# Patient Record
Sex: FEMALE | Race: OTHER | NOT HISPANIC OR LATINO | URBAN - METROPOLITAN AREA
[De-identification: names, ages, dates, MRNs, and addresses within clinical notes are randomized per-mention and may not be internally consistent; named-entity substitution may affect disease eponyms.]

---

## 2017-01-12 ENCOUNTER — EMERGENCY (EMERGENCY)
Facility: HOSPITAL | Age: 67
LOS: 1 days | Discharge: HOME | End: 2017-01-12
Attending: EMERGENCY MEDICINE | Admitting: EMERGENCY MEDICINE
Payer: MEDICARE

## 2017-01-12 VITALS
RESPIRATION RATE: 20 BRPM | HEART RATE: 84 BPM | SYSTOLIC BLOOD PRESSURE: 163 MMHG | OXYGEN SATURATION: 97 % | TEMPERATURE: 98 F | DIASTOLIC BLOOD PRESSURE: 89 MMHG

## 2017-01-12 DIAGNOSIS — R07.2 PRECORDIAL PAIN: ICD-10-CM

## 2017-01-12 DIAGNOSIS — E03.9 HYPOTHYROIDISM, UNSPECIFIED: ICD-10-CM

## 2017-01-12 DIAGNOSIS — Z87.891 PERSONAL HISTORY OF NICOTINE DEPENDENCE: ICD-10-CM

## 2017-01-12 DIAGNOSIS — R06.02 SHORTNESS OF BREATH: ICD-10-CM

## 2017-01-12 DIAGNOSIS — E11.9 TYPE 2 DIABETES MELLITUS WITHOUT COMPLICATIONS: ICD-10-CM

## 2017-01-12 DIAGNOSIS — I10 ESSENTIAL (PRIMARY) HYPERTENSION: ICD-10-CM

## 2017-01-12 LAB
ALBUMIN SERPL ELPH-MCNC: 4 G/DL — SIGNIFICANT CHANGE UP (ref 3.3–5)
ALP SERPL-CCNC: 135 U/L — HIGH (ref 40–120)
ALT FLD-CCNC: 79 U/L RC — HIGH (ref 10–45)
ANION GAP SERPL CALC-SCNC: 15 MMOL/L — SIGNIFICANT CHANGE UP (ref 5–17)
APTT BLD: 29.7 SEC — SIGNIFICANT CHANGE UP (ref 27.5–37.4)
AST SERPL-CCNC: 39 U/L — SIGNIFICANT CHANGE UP (ref 10–40)
BASE EXCESS BLDV CALC-SCNC: -3 MMOL/L — LOW (ref -2–2)
BASOPHILS # BLD AUTO: 0.1 K/UL — SIGNIFICANT CHANGE UP (ref 0–0.2)
BASOPHILS NFR BLD AUTO: 0.9 % — SIGNIFICANT CHANGE UP (ref 0–2)
BILIRUB SERPL-MCNC: 0.6 MG/DL — SIGNIFICANT CHANGE UP (ref 0.2–1.2)
BUN SERPL-MCNC: 20 MG/DL — SIGNIFICANT CHANGE UP (ref 7–23)
CA-I SERPL-SCNC: 1.2 MMOL/L — SIGNIFICANT CHANGE UP (ref 1.12–1.3)
CALCIUM SERPL-MCNC: 9.2 MG/DL — SIGNIFICANT CHANGE UP (ref 8.4–10.5)
CHLORIDE BLDV-SCNC: 105 MMOL/L — SIGNIFICANT CHANGE UP (ref 96–108)
CHLORIDE SERPL-SCNC: 100 MMOL/L — SIGNIFICANT CHANGE UP (ref 96–108)
CK MB CFR SERPL CALC: 1.8 NG/ML — SIGNIFICANT CHANGE UP (ref 0–3.8)
CK SERPL-CCNC: 47 U/L — SIGNIFICANT CHANGE UP (ref 25–170)
CO2 BLDV-SCNC: 22 MMOL/L — SIGNIFICANT CHANGE UP (ref 22–30)
CO2 SERPL-SCNC: 19 MMOL/L — LOW (ref 22–31)
CREAT SERPL-MCNC: 0.9 MG/DL — SIGNIFICANT CHANGE UP (ref 0.5–1.3)
EOSINOPHIL # BLD AUTO: 0 K/UL — SIGNIFICANT CHANGE UP (ref 0–0.5)
EOSINOPHIL NFR BLD AUTO: 0.2 % — SIGNIFICANT CHANGE UP (ref 0–6)
GAS PNL BLDV: 135 MMOL/L — LOW (ref 136–145)
GAS PNL BLDV: SIGNIFICANT CHANGE UP
GLUCOSE BLDV-MCNC: 225 MG/DL — HIGH (ref 70–99)
GLUCOSE SERPL-MCNC: 221 MG/DL — HIGH (ref 70–99)
HCO3 BLDV-SCNC: 21 MMOL/L — SIGNIFICANT CHANGE UP (ref 21–29)
HCT VFR BLD CALC: 46.5 % — HIGH (ref 34.5–45)
HCT VFR BLDA CALC: 50 % — SIGNIFICANT CHANGE UP (ref 39–50)
HGB BLD CALC-MCNC: 16.4 G/DL — HIGH (ref 11.5–15.5)
HGB BLD-MCNC: 16 G/DL — HIGH (ref 11.5–15.5)
INR BLD: 1.04 RATIO — SIGNIFICANT CHANGE UP (ref 0.88–1.16)
LACTATE BLDV-MCNC: 2.3 MMOL/L — HIGH (ref 0.7–2)
LYMPHOCYTES # BLD AUTO: 33.7 % — SIGNIFICANT CHANGE UP (ref 13–44)
LYMPHOCYTES # BLD AUTO: 4.7 K/UL — HIGH (ref 1–3.3)
MCHC RBC-ENTMCNC: 32.4 PG — SIGNIFICANT CHANGE UP (ref 27–34)
MCHC RBC-ENTMCNC: 34.4 GM/DL — SIGNIFICANT CHANGE UP (ref 32–36)
MCV RBC AUTO: 94.2 FL — SIGNIFICANT CHANGE UP (ref 80–100)
MONOCYTES # BLD AUTO: 1.4 K/UL — HIGH (ref 0–0.9)
MONOCYTES NFR BLD AUTO: 10 % — SIGNIFICANT CHANGE UP (ref 2–14)
NEUTROPHILS # BLD AUTO: 7.7 K/UL — HIGH (ref 1.8–7.4)
NEUTROPHILS NFR BLD AUTO: 55.2 % — SIGNIFICANT CHANGE UP (ref 43–77)
NT-PROBNP SERPL-SCNC: 38 PG/ML — SIGNIFICANT CHANGE UP (ref 0–300)
PCO2 BLDV: 36 MMHG — SIGNIFICANT CHANGE UP (ref 35–50)
PH BLDV: 7.38 — SIGNIFICANT CHANGE UP (ref 7.35–7.45)
PLATELET # BLD AUTO: 186 K/UL — SIGNIFICANT CHANGE UP (ref 150–400)
PO2 BLDV: 60 MMHG — HIGH (ref 25–45)
POTASSIUM BLDV-SCNC: 4.3 MMOL/L — SIGNIFICANT CHANGE UP (ref 3.5–5)
POTASSIUM SERPL-MCNC: 4.5 MMOL/L — SIGNIFICANT CHANGE UP (ref 3.5–5.3)
POTASSIUM SERPL-SCNC: 4.5 MMOL/L — SIGNIFICANT CHANGE UP (ref 3.5–5.3)
PROT SERPL-MCNC: 7.7 G/DL — SIGNIFICANT CHANGE UP (ref 6–8.3)
PROTHROM AB SERPL-ACNC: 11.3 SEC — SIGNIFICANT CHANGE UP (ref 10–13.1)
RBC # BLD: 4.93 M/UL — SIGNIFICANT CHANGE UP (ref 3.8–5.2)
RBC # FLD: 13.2 % — SIGNIFICANT CHANGE UP (ref 10.3–14.5)
SAO2 % BLDV: 89 % — HIGH (ref 67–88)
SODIUM SERPL-SCNC: 134 MMOL/L — LOW (ref 135–145)
TROPONIN T SERPL-MCNC: <0.01 NG/ML — SIGNIFICANT CHANGE UP (ref 0–0.06)
TROPONIN T SERPL-MCNC: <0.01 NG/ML — SIGNIFICANT CHANGE UP (ref 0–0.06)
WBC # BLD: 13.9 K/UL — HIGH (ref 3.8–10.5)
WBC # FLD AUTO: 13.9 K/UL — HIGH (ref 3.8–10.5)

## 2017-01-12 PROCEDURE — 93010 ELECTROCARDIOGRAM REPORT: CPT

## 2017-01-12 PROCEDURE — 99220: CPT | Mod: 25

## 2017-01-12 PROCEDURE — 71020: CPT | Mod: 26

## 2017-01-12 PROCEDURE — 71275 CT ANGIOGRAPHY CHEST: CPT | Mod: 26

## 2017-01-12 PROCEDURE — 93308 TTE F-UP OR LMTD: CPT | Mod: 26

## 2017-01-12 RX ORDER — ASPIRIN/CALCIUM CARB/MAGNESIUM 324 MG
324 TABLET ORAL DAILY
Qty: 0 | Refills: 0 | Status: DISCONTINUED | OUTPATIENT
Start: 2017-01-12 | End: 2017-01-12

## 2017-01-12 RX ORDER — SODIUM CHLORIDE 9 MG/ML
3 INJECTION INTRAMUSCULAR; INTRAVENOUS; SUBCUTANEOUS EVERY 12 HOURS
Qty: 0 | Refills: 0 | Status: DISCONTINUED | OUTPATIENT
Start: 2017-01-12 | End: 2017-01-16

## 2017-01-12 RX ORDER — SODIUM CHLORIDE 9 MG/ML
1000 INJECTION INTRAMUSCULAR; INTRAVENOUS; SUBCUTANEOUS ONCE
Qty: 0 | Refills: 0 | Status: COMPLETED | OUTPATIENT
Start: 2017-01-12 | End: 2017-01-12

## 2017-01-12 RX ORDER — IPRATROPIUM/ALBUTEROL SULFATE 18-103MCG
3 AEROSOL WITH ADAPTER (GRAM) INHALATION ONCE
Qty: 0 | Refills: 0 | Status: COMPLETED | OUTPATIENT
Start: 2017-01-12 | End: 2017-01-12

## 2017-01-12 RX ORDER — ASPIRIN/CALCIUM CARB/MAGNESIUM 324 MG
162 TABLET ORAL DAILY
Qty: 0 | Refills: 0 | Status: DISCONTINUED | OUTPATIENT
Start: 2017-01-12 | End: 2017-01-16

## 2017-01-12 RX ORDER — IPRATROPIUM/ALBUTEROL SULFATE 18-103MCG
3 AEROSOL WITH ADAPTER (GRAM) INHALATION EVERY 6 HOURS
Qty: 0 | Refills: 0 | Status: DISCONTINUED | OUTPATIENT
Start: 2017-01-12 | End: 2017-01-16

## 2017-01-12 RX ORDER — SODIUM CHLORIDE 9 MG/ML
3 INJECTION INTRAMUSCULAR; INTRAVENOUS; SUBCUTANEOUS ONCE
Qty: 0 | Refills: 0 | Status: COMPLETED | OUTPATIENT
Start: 2017-01-12 | End: 2017-01-12

## 2017-01-12 RX ADMIN — SODIUM CHLORIDE 3 MILLILITER(S): 9 INJECTION INTRAMUSCULAR; INTRAVENOUS; SUBCUTANEOUS at 16:27

## 2017-01-12 RX ADMIN — SODIUM CHLORIDE 3 MILLILITER(S): 9 INJECTION INTRAMUSCULAR; INTRAVENOUS; SUBCUTANEOUS at 11:16

## 2017-01-12 RX ADMIN — Medication 162 MILLIGRAM(S): at 12:51

## 2017-01-12 RX ADMIN — SODIUM CHLORIDE 1000 MILLILITER(S): 9 INJECTION INTRAMUSCULAR; INTRAVENOUS; SUBCUTANEOUS at 17:03

## 2017-01-12 RX ADMIN — Medication 3 MILLILITER(S): at 12:50

## 2017-01-12 RX ADMIN — Medication 3 MILLILITER(S): at 22:09

## 2017-01-12 NOTE — ED CDU PROVIDER NOTE - CONDUCTED A DETAILED DISCUSSION WITH PATIENT AND/OR GUARDIAN REGARDING, MDM
radiology results/lab results/need for outpatient follow-up/return to ED if symptoms worsen, persist or questions arise

## 2017-01-12 NOTE — ED PROVIDER NOTE - PROGRESS NOTE DETAILS
Pt pending CTA read. Bedside echo showed grossly preserved systolic function with mild pericardial effusion. BNP <50. Will admit to CDU for nuclear stress test. Pt will need follow up with cardiology after nuc stress test.

## 2017-01-12 NOTE — ED PROVIDER NOTE - INTERPRETATION
normal sinus rhythm, Normal axis, Normal AL interval and QRS complex. There are no acute ischemic ST or T-wave changes.

## 2017-01-12 NOTE — ED CDU PROVIDER NOTE - PLAN OF CARE
1.  Stay Hydrated  2.  Follow up with your PMD in 2-3 days.  Bring a copy of your results with you to your appointment. Given incidental CT findings (pulmonary nodules) we recommend repeat CT scan in 6 months.  3. Return to the ER for worsening chest pain, shortness of breath or any other concerning symptoms 1.  Stay Hydrated  2.  Follow up at the [facility] Medicine [facility] and the Cardiology Clinic as soon as possible for further evaluation (phone numbers provided).  Bring a copy of your results with you to your appointment. Given incidental CT findings (pulmonary nodules) we recommend repeat CT scan in 6 months.  3. Return to the ER for worsening chest pain, shortness of breath or any other concerning symptoms

## 2017-01-12 NOTE — ED PROVIDER NOTE - ATTENDING CONTRIBUTION TO CARE
I performed a history and physical exam of the patient and discussed their management with the PA. I reviewed the resident's note and agree with the documented findings and plan of care.     66yoF with hx heavy smoking presents to ED c/o worsening exertional dyspnea x 4 weeks. States can ambulate half a block without stopping. No nausea, chest pain. No diaphoresis. Initially seen in urgent care, sent for EKG with possible inferior infarct aqe indeterminate and RBBB.   Gen: well appearing without any distress. AAOx3.  HEENT: PERRL, EOMI, atraumatic  Neck: supple  Heart: RRR, S1S2  Lungs: fait wheeze RUL field. otherwise CTA.   Abd: soft, nontender, nondistended  Ext: No deformity. No erythema. No calf tenderness. Equal calf sizes. trace edema.   Neuro: Grossly intact. Normal gait.     A: Dyspnea diff dx includes chf/acs, PE, COPD ex  P: combineb  labs inc Martina  EKG- RBBB with Q waves inferior leads unchanged from urgent care EKG.   CXR  Echo  possible CDU admission for stress test.

## 2017-01-12 NOTE — ED CDU PROVIDER NOTE - DETAILS
Dyspnea on Exertion  -Frequent reevaluations  -tele monitoring  -repeat troponin w/ ekg  -nuclear stress test in AM  -Case d/w Dr. Pace

## 2017-01-12 NOTE — ED ADULT NURSE NOTE - OBJECTIVE STATEMENT
65 y/o female c/o MARK  getting worse over the past week.  Pt has had SOB over the past 4 weeks, was informed by PMD it was bronchitis.  Pt was given  azithromycin and then steroids which she completed.  Meds helped with the cough but still  with SOB.  No chest pain, no palpitations, no nausea, no vomiting, no sweating, no fever,  no leg swelling.  Respiration easy and non nvmfr9er.  Skin warm and dry. 65 y/o female c/o MARK  getting worse over the past week.  Pt has had SOB over the past 4 weeks, was informed by PMD it was bronchitis.  Pt was given  azithromycin and then steroids which she completed.  Meds helped with the cough but still  with SOB.  No chest pain, no palpitations, no nausea, no vomiting, no sweating, no fever,  no leg swelling.  Respiration easy and non labored.  Skin warm and dry.

## 2017-01-12 NOTE — ED CDU PROVIDER NOTE - PROGRESS NOTE DETAILS
Patient seen at bedside in NAD.  VSS.  Patient resting comfortably without complaints.  Patient reports that CP has resolved.  Still with SOB at rest.  No cardiac events noted on tele.  Will give duoneb and reasess. -Aleksey Moe PA-C Patient seen at bedside in NAD.  VSS.  Patient resting comfortably without complaints. Patient reports breathing improved s/p nebs.  No cardiac events noted on tele -Aleksey Moe PA-C Patient seen at bedside in NAD.  VSS.  Patient resting comfortably without complaints. No cardiac events noted on tele -Aleksey Moe PA-C Patient resting comfortably in bed, NAD, VSS, no comlaints at this time, no events on tele. Transportation here to bring patient to stress test. Marcin Fraser PA-C. Kathrin: Patient did well overnight. Went to nuclear stress test this am.  Patient with no chest pain but in continuous eval for MARK.  If stress test ok will d/c.  I Ranulfo Pinon MD have personally performed a face to face diagnostic evaluation on this patient.  I have reviewed the ACP note and agree with the history, exam, and plan of care, except as noted.   My medical decison making and observations are found above.  T Patient returned from Stress test, NAD, VSS, no complaints. Results show no ekg changes or areas of ischemia. Will discharge home to follow up with PMD and Cardiologist (will provide with phone numbers). Strict return instructions provided, discussed w/ Dr. Pinon, patient is stable for discharge home. Marcin Fraser PA-C.

## 2017-01-12 NOTE — ED PROVIDER NOTE - OBJECTIVE STATEMENT
66 y.o. female former smoker coming in with Gaming that has been getting worse over the past week.  Pt has had SoB over the past 4 weeks, was told it was bronchitis.  Completed a course of azithromycin and then steroids which helped with the cough but still SoB.  No chest pain or palpitations, no nausea, no vomiting, no sweating.  No swelling of LE's or recent prolonged immobilization.

## 2017-01-12 NOTE — ED CDU PROVIDER NOTE - OBJECTIVE STATEMENT
66 y.o. female former smoker coming in with Gaming that has been getting worse over the past week.  Pt has had SoB over the past 4 weeks, was told it was bronchitis.  Completed a course of azithromycin and then steroids which helped with the cough but still SoB. No palpitations, no nausea, no vomiting, no sweating.  No swelling of LE's or recent prolonged immobilization.  Does endorse some non-radiating substernal chest pain this morning which has resolved. CTA done while patient was in ED shows no PE. Will plan for repeat troponin and stress test.

## 2017-01-12 NOTE — ED CDU PROVIDER NOTE - ATTENDING CONTRIBUTION TO CARE
Mil Pace DO: I performed a history and physical exam of the patient and discussed their management with the PA. I reviewed the resident's note and agree with the documented findings and plan of care. Patient has exertional dyspnea, possibly 2/2 viral colin/myocarditis vs old MI. Patient will need nuclear stress test, followed by Cardiology eval (outpt)

## 2017-01-13 VITALS
OXYGEN SATURATION: 97 % | HEART RATE: 76 BPM | SYSTOLIC BLOOD PRESSURE: 146 MMHG | DIASTOLIC BLOOD PRESSURE: 93 MMHG | RESPIRATION RATE: 18 BRPM | TEMPERATURE: 98 F

## 2017-01-13 PROCEDURE — 85379 FIBRIN DEGRADATION QUANT: CPT

## 2017-01-13 PROCEDURE — 82550 ASSAY OF CK (CPK): CPT

## 2017-01-13 PROCEDURE — G0378: CPT

## 2017-01-13 PROCEDURE — 85730 THROMBOPLASTIN TIME PARTIAL: CPT

## 2017-01-13 PROCEDURE — 82330 ASSAY OF CALCIUM: CPT

## 2017-01-13 PROCEDURE — 78452 HT MUSCLE IMAGE SPECT MULT: CPT

## 2017-01-13 PROCEDURE — 84484 ASSAY OF TROPONIN QUANT: CPT

## 2017-01-13 PROCEDURE — 99284 EMERGENCY DEPT VISIT MOD MDM: CPT | Mod: 25

## 2017-01-13 PROCEDURE — 93005 ELECTROCARDIOGRAM TRACING: CPT

## 2017-01-13 PROCEDURE — 71046 X-RAY EXAM CHEST 2 VIEWS: CPT

## 2017-01-13 PROCEDURE — 93018 CV STRESS TEST I&R ONLY: CPT

## 2017-01-13 PROCEDURE — 84132 ASSAY OF SERUM POTASSIUM: CPT

## 2017-01-13 PROCEDURE — 82803 BLOOD GASES ANY COMBINATION: CPT

## 2017-01-13 PROCEDURE — 85014 HEMATOCRIT: CPT

## 2017-01-13 PROCEDURE — 84295 ASSAY OF SERUM SODIUM: CPT

## 2017-01-13 PROCEDURE — 93016 CV STRESS TEST SUPVJ ONLY: CPT

## 2017-01-13 PROCEDURE — 82553 CREATINE MB FRACTION: CPT

## 2017-01-13 PROCEDURE — 94640 AIRWAY INHALATION TREATMENT: CPT

## 2017-01-13 PROCEDURE — 71275 CT ANGIOGRAPHY CHEST: CPT

## 2017-01-13 PROCEDURE — 82435 ASSAY OF BLOOD CHLORIDE: CPT

## 2017-01-13 PROCEDURE — 85610 PROTHROMBIN TIME: CPT

## 2017-01-13 PROCEDURE — 78452 HT MUSCLE IMAGE SPECT MULT: CPT | Mod: 26

## 2017-01-13 PROCEDURE — A9500: CPT

## 2017-01-13 PROCEDURE — 85027 COMPLETE CBC AUTOMATED: CPT

## 2017-01-13 PROCEDURE — 80053 COMPREHEN METABOLIC PANEL: CPT

## 2017-01-13 PROCEDURE — 93017 CV STRESS TEST TRACING ONLY: CPT

## 2017-01-13 PROCEDURE — 83605 ASSAY OF LACTIC ACID: CPT

## 2017-01-13 PROCEDURE — 93308 TTE F-UP OR LMTD: CPT

## 2017-01-13 PROCEDURE — 99217: CPT

## 2017-01-13 PROCEDURE — 82947 ASSAY GLUCOSE BLOOD QUANT: CPT

## 2017-01-13 PROCEDURE — 83880 ASSAY OF NATRIURETIC PEPTIDE: CPT

## 2017-01-13 RX ORDER — DIPHENHYDRAMINE HCL 50 MG
25 CAPSULE ORAL ONCE
Qty: 0 | Refills: 0 | Status: COMPLETED | OUTPATIENT
Start: 2017-01-13 | End: 2017-01-13

## 2017-01-13 RX ADMIN — Medication 3 MILLILITER(S): at 12:08

## 2017-01-13 RX ADMIN — Medication 25 MILLIGRAM(S): at 00:35

## 2017-01-13 RX ADMIN — Medication 3 MILLILITER(S): at 03:46

## 2017-01-13 RX ADMIN — SODIUM CHLORIDE 3 MILLILITER(S): 9 INJECTION INTRAMUSCULAR; INTRAVENOUS; SUBCUTANEOUS at 06:56

## 2017-01-13 NOTE — ED ADULT NURSE REASSESSMENT NOTE - NS ED NURSE REASSESS COMMENT FT1
Pt. discharged to home as per Dr. Rosado's order. Pt. verbalized understanding to discharge instructions given by LIAM Burch. Pt. to follow up with PCP and cardiologist post discharge. Pt. to return to ER for worsening symptoms such as fever, CP and SOB. Questions answered and verbalized good understanding. Discharge criteria met. Discharged home as per protocol. Telemetry discontinued and IVL removed by LIAM Burch. No redness or bleeding from IV site. Pt. left the unit in stable condition accompanied by family.
Received pt. from GLORY Sim. VSS. NSR on tele. No acute distress noted. No complaints voiced. IV access patent. Educated about fall precautions. Safety maintained. Awaiting nuclear stress test this am. Needs attended to. Will continue to monitor.
Pt received from LEANNA Holloway. No complaints of SOB or dizziness. Pt has some dyspnea on exertion. Nebulizer treatment given as per MAR. 2L NC provided: pt O2 at 98%. Plan of care explained. Safety & comfort measures maintained. Call bell in reach. Will continue to monitor.

## 2017-02-07 PROBLEM — E11.9 TYPE 2 DIABETES MELLITUS WITHOUT COMPLICATIONS: Chronic | Status: ACTIVE | Noted: 2017-01-12

## 2017-02-07 PROBLEM — E05.90 THYROTOXICOSIS, UNSPECIFIED WITHOUT THYROTOXIC CRISIS OR STORM: Chronic | Status: ACTIVE | Noted: 2017-01-12

## 2017-02-07 PROBLEM — I10 ESSENTIAL (PRIMARY) HYPERTENSION: Chronic | Status: ACTIVE | Noted: 2017-01-12

## 2017-03-07 ENCOUNTER — APPOINTMENT (OUTPATIENT)
Dept: OBGYN | Facility: CLINIC | Age: 67
End: 2017-03-07

## 2017-03-07 VITALS
BODY MASS INDEX: 39.56 KG/M2 | HEART RATE: 76 BPM | DIASTOLIC BLOOD PRESSURE: 88 MMHG | HEIGHT: 62 IN | WEIGHT: 215 LBS | SYSTOLIC BLOOD PRESSURE: 146 MMHG

## 2017-03-07 DIAGNOSIS — Z80.3 FAMILY HISTORY OF MALIGNANT NEOPLASM OF BREAST: ICD-10-CM

## 2017-03-07 DIAGNOSIS — Z83.3 FAMILY HISTORY OF DIABETES MELLITUS: ICD-10-CM

## 2017-03-07 DIAGNOSIS — Z83.49 FAMILY HISTORY OF OTHER ENDOCRINE, NUTRITIONAL AND METABOLIC DISEASES: ICD-10-CM

## 2017-03-07 DIAGNOSIS — Z12.31 ENCOUNTER FOR SCREENING MAMMOGRAM FOR MALIGNANT NEOPLASM OF BREAST: ICD-10-CM

## 2017-03-07 DIAGNOSIS — Z78.9 OTHER SPECIFIED HEALTH STATUS: ICD-10-CM

## 2017-03-07 DIAGNOSIS — Z82.49 FAMILY HISTORY OF ISCHEMIC HEART DISEASE AND OTHER DISEASES OF THE CIRCULATORY SYSTEM: ICD-10-CM

## 2017-03-07 DIAGNOSIS — Z86.39 PERSONAL HISTORY OF OTHER ENDOCRINE, NUTRITIONAL AND METABOLIC DISEASE: ICD-10-CM

## 2017-03-09 LAB — HPV HIGH+LOW RISK DNA PNL CVX: NEGATIVE

## 2017-03-10 LAB — CYTOLOGY CVX/VAG DOC THIN PREP: NORMAL

## 2017-03-31 ENCOUNTER — APPOINTMENT (OUTPATIENT)
Dept: PULMONOLOGY | Facility: CLINIC | Age: 67
End: 2017-03-31

## 2017-03-31 VITALS
SYSTOLIC BLOOD PRESSURE: 120 MMHG | HEIGHT: 62 IN | DIASTOLIC BLOOD PRESSURE: 80 MMHG | HEART RATE: 74 BPM | WEIGHT: 220 LBS | OXYGEN SATURATION: 97 % | BODY MASS INDEX: 40.48 KG/M2

## 2017-03-31 DIAGNOSIS — Z01.419 ENCOUNTER FOR GYNECOLOGICAL EXAMINATION (GENERAL) (ROUTINE) W/OUT ABNORMAL FINDINGS: ICD-10-CM

## 2017-03-31 DIAGNOSIS — Z86.39 PERSONAL HISTORY OF OTHER ENDOCRINE, NUTRITIONAL AND METABOLIC DISEASE: ICD-10-CM

## 2017-03-31 DIAGNOSIS — Z86.79 PERSONAL HISTORY OF OTHER DISEASES OF THE CIRCULATORY SYSTEM: ICD-10-CM

## 2017-03-31 DIAGNOSIS — Z82.3 FAMILY HISTORY OF STROKE: ICD-10-CM

## 2017-03-31 DIAGNOSIS — E78.00 PURE HYPERCHOLESTEROLEMIA, UNSPECIFIED: ICD-10-CM

## 2017-03-31 DIAGNOSIS — Z13.820 ENCOUNTER FOR SCREENING FOR OSTEOPOROSIS: ICD-10-CM

## 2017-03-31 RX ORDER — FLUTICASONE PROPIONATE 50 UG/1
50 SPRAY, METERED NASAL
Qty: 16 | Refills: 0 | Status: ACTIVE | COMMUNITY
Start: 2016-12-26

## 2017-03-31 RX ORDER — AZITHROMYCIN 250 MG/1
250 TABLET, FILM COATED ORAL
Qty: 6 | Refills: 0 | Status: DISCONTINUED | COMMUNITY
Start: 2017-01-02 | End: 2017-03-31

## 2017-03-31 RX ORDER — AZITHROMYCIN 500 MG/1
500 TABLET, FILM COATED ORAL
Qty: 5 | Refills: 0 | Status: DISCONTINUED | COMMUNITY
Start: 2016-12-26 | End: 2017-03-31

## 2017-03-31 RX ORDER — BENZONATATE 200 MG/1
200 CAPSULE ORAL
Qty: 14 | Refills: 0 | Status: DISCONTINUED | COMMUNITY
Start: 2016-12-26 | End: 2017-03-31

## 2017-03-31 RX ORDER — PREDNISONE 20 MG/1
20 TABLET ORAL
Qty: 10 | Refills: 0 | Status: DISCONTINUED | COMMUNITY
Start: 2017-01-02 | End: 2017-03-31

## 2017-05-03 ENCOUNTER — LABORATORY RESULT (OUTPATIENT)
Age: 67
End: 2017-05-03

## 2017-05-04 ENCOUNTER — APPOINTMENT (OUTPATIENT)
Dept: INTERNAL MEDICINE | Facility: CLINIC | Age: 67
End: 2017-05-04

## 2017-05-04 VITALS
WEIGHT: 215 LBS | OXYGEN SATURATION: 98 % | SYSTOLIC BLOOD PRESSURE: 154 MMHG | DIASTOLIC BLOOD PRESSURE: 88 MMHG | HEIGHT: 62 IN | BODY MASS INDEX: 39.56 KG/M2 | HEART RATE: 81 BPM

## 2017-05-04 DIAGNOSIS — Z87.891 PERSONAL HISTORY OF NICOTINE DEPENDENCE: ICD-10-CM

## 2017-05-04 DIAGNOSIS — I45.10 UNSPECIFIED RIGHT BUNDLE-BRANCH BLOCK: ICD-10-CM

## 2017-05-04 DIAGNOSIS — R32 UNSPECIFIED URINARY INCONTINENCE: ICD-10-CM

## 2017-05-04 RX ORDER — METFORMIN HYDROCHLORIDE 500 MG/1
500 TABLET, COATED ORAL
Qty: 60 | Refills: 0 | Status: DISCONTINUED | COMMUNITY
Start: 2016-12-26 | End: 2017-05-04

## 2017-05-04 RX ORDER — ATORVASTATIN CALCIUM 10 MG/1
10 TABLET, FILM COATED ORAL
Refills: 0 | Status: DISCONTINUED | COMMUNITY
End: 2017-05-04

## 2017-05-05 LAB
25(OH)D3 SERPL-MCNC: 29.1 NG/ML
ALBUMIN SERPL ELPH-MCNC: 4.1 G/DL
ALP BLD-CCNC: 119 U/L
ALT SERPL-CCNC: 101 U/L
ANION GAP SERPL CALC-SCNC: 19 MMOL/L
AST SERPL-CCNC: 84 U/L
BASOPHILS # BLD AUTO: 0.05 K/UL
BASOPHILS NFR BLD AUTO: 0.6 %
BILIRUB SERPL-MCNC: 0.2 MG/DL
BUN SERPL-MCNC: 23 MG/DL
CALCIUM SERPL-MCNC: 9.2 MG/DL
CHLORIDE SERPL-SCNC: 107 MMOL/L
CHOLEST SERPL-MCNC: 188 MG/DL
CHOLEST/HDLC SERPL: 4 RATIO
CO2 SERPL-SCNC: 18 MMOL/L
CREAT SERPL-MCNC: 0.98 MG/DL
EOSINOPHIL # BLD AUTO: 0.21 K/UL
EOSINOPHIL NFR BLD AUTO: 2.4 %
GLUCOSE SERPL-MCNC: 98 MG/DL
HBA1C MFR BLD HPLC: 7.8 %
HCT VFR BLD CALC: 41.4 %
HCV AB SER QL: NONREACTIVE
HCV S/CO RATIO: 0.09 S/CO
HDLC SERPL-MCNC: 47 MG/DL
HGB BLD-MCNC: 13.4 G/DL
IMM GRANULOCYTES NFR BLD AUTO: 0.1 %
LDLC SERPL CALC-MCNC: 81 MG/DL
LYMPHOCYTES # BLD AUTO: 4.03 K/UL
LYMPHOCYTES NFR BLD AUTO: 45.6 %
MAN DIFF?: NORMAL
MCHC RBC-ENTMCNC: 30.4 PG
MCHC RBC-ENTMCNC: 32.4 GM/DL
MCV RBC AUTO: 93.9 FL
MONOCYTES # BLD AUTO: 0.99 K/UL
MONOCYTES NFR BLD AUTO: 11.2 %
NEUTROPHILS # BLD AUTO: 3.54 K/UL
NEUTROPHILS NFR BLD AUTO: 40.1 %
PLATELET # BLD AUTO: 221 K/UL
POTASSIUM SERPL-SCNC: 4.4 MMOL/L
PROT SERPL-MCNC: 7.3 G/DL
RBC # BLD: 4.41 M/UL
RBC # FLD: 14.5 %
SODIUM SERPL-SCNC: 144 MMOL/L
TRIGL SERPL-MCNC: 302 MG/DL
TSH SERPL-ACNC: 5.74 UIU/ML
VIT B12 SERPL-MCNC: 400 PG/ML
WBC # FLD AUTO: 8.83 K/UL

## 2017-05-05 RX ORDER — NISOLDIPINE 17 MG/1
17 TABLET, FILM COATED, EXTENDED RELEASE ORAL DAILY
Qty: 90 | Refills: 1 | Status: DISCONTINUED | COMMUNITY
End: 2017-05-05

## 2017-05-12 LAB — HEMOCCULT STL QL IA: NEGATIVE

## 2017-05-25 ENCOUNTER — APPOINTMENT (OUTPATIENT)
Dept: PULMONOLOGY | Facility: CLINIC | Age: 67
End: 2017-05-25

## 2017-05-25 VITALS
DIASTOLIC BLOOD PRESSURE: 80 MMHG | HEIGHT: 62 IN | RESPIRATION RATE: 17 BRPM | HEART RATE: 76 BPM | OXYGEN SATURATION: 97 % | WEIGHT: 215 LBS | BODY MASS INDEX: 39.56 KG/M2 | SYSTOLIC BLOOD PRESSURE: 150 MMHG

## 2017-05-25 DIAGNOSIS — E66.9 OBESITY, UNSPECIFIED: ICD-10-CM

## 2017-05-25 DIAGNOSIS — J30.9 ALLERGIC RHINITIS, UNSPECIFIED: ICD-10-CM

## 2017-05-25 DIAGNOSIS — G47.30 SLEEP APNEA, UNSPECIFIED: ICD-10-CM

## 2017-05-25 DIAGNOSIS — R93.8 ABNORMAL FINDINGS ON DIAGNOSTIC IMAGING OF OTHER SPECIFIED BODY STRUCTURES: ICD-10-CM

## 2017-05-25 DIAGNOSIS — R06.83 SNORING: ICD-10-CM

## 2017-05-25 RX ORDER — BECLOMETHASONE DIPROPIONATE 80 UG/1
80 AEROSOL, METERED RESPIRATORY (INHALATION) TWICE DAILY
Qty: 3 | Refills: 1 | Status: DISCONTINUED | COMMUNITY
Start: 2017-03-31 | End: 2017-05-25

## 2017-05-25 RX ORDER — GLYCOPYRROLATE AND FORMOTEROL FUMARATE 9; 4.8 UG/1; UG/1
9-4.8 AEROSOL, METERED RESPIRATORY (INHALATION)
Qty: 3 | Refills: 1 | Status: DISCONTINUED | COMMUNITY
Start: 2017-03-31 | End: 2017-05-25

## 2017-05-31 ENCOUNTER — FORM ENCOUNTER (OUTPATIENT)
Age: 67
End: 2017-05-31

## 2017-06-01 ENCOUNTER — APPOINTMENT (OUTPATIENT)
Dept: MAMMOGRAPHY | Facility: IMAGING CENTER | Age: 67
End: 2017-06-01

## 2017-06-01 ENCOUNTER — OUTPATIENT (OUTPATIENT)
Dept: OUTPATIENT SERVICES | Facility: HOSPITAL | Age: 67
LOS: 1 days | End: 2017-06-01
Payer: MEDICARE

## 2017-06-01 ENCOUNTER — APPOINTMENT (OUTPATIENT)
Dept: RADIOLOGY | Facility: IMAGING CENTER | Age: 67
End: 2017-06-01

## 2017-06-01 DIAGNOSIS — Z00.00 ENCOUNTER FOR GENERAL ADULT MEDICAL EXAMINATION WITHOUT ABNORMAL FINDINGS: ICD-10-CM

## 2017-06-01 PROCEDURE — 77080 DXA BONE DENSITY AXIAL: CPT

## 2017-06-01 PROCEDURE — 77067 SCR MAMMO BI INCL CAD: CPT

## 2017-06-01 PROCEDURE — 77063 BREAST TOMOSYNTHESIS BI: CPT

## 2017-06-02 ENCOUNTER — APPOINTMENT (OUTPATIENT)
Dept: SLEEP CENTER | Facility: CLINIC | Age: 67
End: 2017-06-02

## 2017-07-13 ENCOUNTER — APPOINTMENT (OUTPATIENT)
Dept: INTERNAL MEDICINE | Facility: CLINIC | Age: 67
End: 2017-07-13

## 2017-07-13 VITALS
TEMPERATURE: 97.7 F | HEART RATE: 88 BPM | HEIGHT: 62 IN | OXYGEN SATURATION: 97 % | SYSTOLIC BLOOD PRESSURE: 128 MMHG | WEIGHT: 215 LBS | BODY MASS INDEX: 39.56 KG/M2 | DIASTOLIC BLOOD PRESSURE: 76 MMHG

## 2017-07-13 RX ORDER — BLOOD-GLUCOSE METER
W/DEVICE EACH MISCELLANEOUS
Qty: 1 | Refills: 0 | Status: ACTIVE | COMMUNITY
Start: 2017-07-13 | End: 1900-01-01

## 2017-07-13 RX ORDER — LANCETS
EACH MISCELLANEOUS
Qty: 60 | Refills: 6 | Status: ACTIVE | COMMUNITY
Start: 2017-07-13 | End: 1900-01-01

## 2017-07-17 LAB
24R-OH-CALCIDIOL SERPL-MCNC: 49.5 PG/ML
ALBUMIN SERPL ELPH-MCNC: 4.5 G/DL
ALP BLD-CCNC: 125 U/L
ALT SERPL-CCNC: 54 U/L
AST SERPL-CCNC: 44 U/L
BILIRUB DIRECT SERPL-MCNC: 0.1 MG/DL
BILIRUB INDIRECT SERPL-MCNC: 0.3 MG/DL
BILIRUB SERPL-MCNC: 0.4 MG/DL
CA-I SERPL-SCNC: 1.23 MMOL/L
CALCIUM SERPL-MCNC: 9.9 MG/DL
CALCIUM SERPL-MCNC: 9.9 MG/DL
HBA1C MFR BLD HPLC: 7.8 %
PARATHYROID HORMONE INTACT: 48 PG/ML
PROT SERPL-MCNC: 7.4 G/DL
T3FREE SERPL-MCNC: 2.73 PG/ML
T4 FREE SERPL-MCNC: 1.4 NG/DL
THYROGLOB AB SERPL-ACNC: <20 IU/ML
THYROPEROXIDASE AB SERPL IA-ACNC: 886 IU/ML
TSH SERPL-ACNC: 4.15 UIU/ML

## 2017-07-25 ENCOUNTER — RX RENEWAL (OUTPATIENT)
Age: 67
End: 2017-07-25

## 2017-07-31 ENCOUNTER — RX RENEWAL (OUTPATIENT)
Age: 67
End: 2017-07-31

## 2017-08-11 ENCOUNTER — INPATIENT (INPATIENT)
Facility: HOSPITAL | Age: 67
LOS: 3 days | Discharge: HOME | DRG: 558 | End: 2017-08-15
Attending: INTERNAL MEDICINE | Admitting: INTERNAL MEDICINE
Payer: MEDICARE

## 2017-08-11 VITALS
TEMPERATURE: 98 F | OXYGEN SATURATION: 99 % | HEART RATE: 86 BPM | DIASTOLIC BLOOD PRESSURE: 109 MMHG | RESPIRATION RATE: 16 BRPM | SYSTOLIC BLOOD PRESSURE: 168 MMHG

## 2017-08-11 DIAGNOSIS — R07.9 CHEST PAIN, UNSPECIFIED: ICD-10-CM

## 2017-08-11 LAB
ALBUMIN SERPL ELPH-MCNC: 4.1 G/DL — SIGNIFICANT CHANGE UP (ref 3.3–5)
ALP SERPL-CCNC: 120 U/L — SIGNIFICANT CHANGE UP (ref 40–120)
ALT FLD-CCNC: 92 U/L RC — HIGH (ref 10–45)
ANION GAP SERPL CALC-SCNC: 19 MMOL/L — HIGH (ref 5–17)
APTT BLD: 29.6 SEC — SIGNIFICANT CHANGE UP (ref 27.5–37.4)
AST SERPL-CCNC: 96 U/L — HIGH (ref 10–40)
B PERT IGG+IGM PNL SER: ABNORMAL
BASOPHILS # BLD AUTO: 0.1 K/UL — SIGNIFICANT CHANGE UP (ref 0–0.2)
BASOPHILS NFR BLD AUTO: 0.6 % — SIGNIFICANT CHANGE UP (ref 0–2)
BILIRUB SERPL-MCNC: 0.7 MG/DL — SIGNIFICANT CHANGE UP (ref 0.2–1.2)
BLD GP AB SCN SERPL QL: NEGATIVE — SIGNIFICANT CHANGE UP
BUN SERPL-MCNC: 28 MG/DL — HIGH (ref 7–23)
CALCIUM SERPL-MCNC: 9.4 MG/DL — SIGNIFICANT CHANGE UP (ref 8.4–10.5)
CHLORIDE SERPL-SCNC: 95 MMOL/L — LOW (ref 96–108)
CK MB CFR SERPL CALC: 2.4 NG/ML — SIGNIFICANT CHANGE UP (ref 0–3.8)
CK SERPL-CCNC: 63 U/L — SIGNIFICANT CHANGE UP (ref 25–170)
CO2 SERPL-SCNC: 20 MMOL/L — LOW (ref 22–31)
COLOR FLD: SIGNIFICANT CHANGE UP
CREAT SERPL-MCNC: 1.07 MG/DL — SIGNIFICANT CHANGE UP (ref 0.5–1.3)
CRYSTALS SNV QL MICRO: SIGNIFICANT CHANGE UP
EOSINOPHIL # BLD AUTO: 0 K/UL — SIGNIFICANT CHANGE UP (ref 0–0.5)
EOSINOPHIL NFR BLD AUTO: 0.1 % — SIGNIFICANT CHANGE UP (ref 0–6)
FLUID INTAKE SUBSTANCE CLASS: SIGNIFICANT CHANGE UP
FLUID SEGMENTED GRANULOCYTES: 97 % — SIGNIFICANT CHANGE UP
GLUCOSE SERPL-MCNC: 262 MG/DL — HIGH (ref 70–99)
GRAM STN FLD: SIGNIFICANT CHANGE UP
HCT VFR BLD CALC: 40.5 % — SIGNIFICANT CHANGE UP (ref 34.5–45)
HGB BLD-MCNC: 13.7 G/DL — SIGNIFICANT CHANGE UP (ref 11.5–15.5)
INR BLD: 1.08 RATIO — SIGNIFICANT CHANGE UP (ref 0.88–1.16)
LYMPHOCYTES # BLD AUTO: 1.8 K/UL — SIGNIFICANT CHANGE UP (ref 1–3.3)
LYMPHOCYTES # BLD AUTO: 12.9 % — LOW (ref 13–44)
LYMPHOCYTES # FLD: 2 % — SIGNIFICANT CHANGE UP
MCHC RBC-ENTMCNC: 32.4 PG — SIGNIFICANT CHANGE UP (ref 27–34)
MCHC RBC-ENTMCNC: 33.8 GM/DL — SIGNIFICANT CHANGE UP (ref 32–36)
MCV RBC AUTO: 95.7 FL — SIGNIFICANT CHANGE UP (ref 80–100)
MONOCYTES # BLD AUTO: 1.8 K/UL — HIGH (ref 0–0.9)
MONOCYTES NFR BLD AUTO: 13.1 % — SIGNIFICANT CHANGE UP (ref 2–14)
MONOS+MACROS # FLD: 1 % — SIGNIFICANT CHANGE UP
NEUTROPHILS # BLD AUTO: 10.3 K/UL — HIGH (ref 1.8–7.4)
NEUTROPHILS NFR BLD AUTO: 73.3 % — SIGNIFICANT CHANGE UP (ref 43–77)
PLATELET # BLD AUTO: 178 K/UL — SIGNIFICANT CHANGE UP (ref 150–400)
POTASSIUM SERPL-MCNC: 4.7 MMOL/L — SIGNIFICANT CHANGE UP (ref 3.5–5.3)
POTASSIUM SERPL-SCNC: 4.7 MMOL/L — SIGNIFICANT CHANGE UP (ref 3.5–5.3)
PROT SERPL-MCNC: 7.5 G/DL — SIGNIFICANT CHANGE UP (ref 6–8.3)
PROTHROM AB SERPL-ACNC: 11.8 SEC — SIGNIFICANT CHANGE UP (ref 9.8–12.7)
RBC # BLD: 4.23 M/UL — SIGNIFICANT CHANGE UP (ref 3.8–5.2)
RBC # FLD: 12.6 % — SIGNIFICANT CHANGE UP (ref 10.3–14.5)
RCV VOL RI: HIGH /UL (ref 0–5)
RH IG SCN BLD-IMP: POSITIVE — SIGNIFICANT CHANGE UP
SODIUM SERPL-SCNC: 134 MMOL/L — LOW (ref 135–145)
SPECIMEN SOURCE: SIGNIFICANT CHANGE UP
TOTAL NUCLEATED CELL COUNT, BODY FLUID: HIGH /UL (ref 0–5)
TROPONIN T SERPL-MCNC: <0.01 NG/ML — SIGNIFICANT CHANGE UP (ref 0–0.06)
TUBE TYPE: SIGNIFICANT CHANGE UP
WBC # BLD: 14 K/UL — HIGH (ref 3.8–10.5)
WBC # FLD AUTO: 14 K/UL — HIGH (ref 3.8–10.5)

## 2017-08-11 PROCEDURE — 73030 X-RAY EXAM OF SHOULDER: CPT | Mod: 26,LT

## 2017-08-11 PROCEDURE — 71020: CPT | Mod: 26

## 2017-08-11 PROCEDURE — 74174 CTA ABD&PLVS W/CONTRAST: CPT | Mod: 26

## 2017-08-11 PROCEDURE — 71275 CT ANGIOGRAPHY CHEST: CPT | Mod: 26

## 2017-08-11 PROCEDURE — 93010 ELECTROCARDIOGRAM REPORT: CPT

## 2017-08-11 PROCEDURE — 99285 EMERGENCY DEPT VISIT HI MDM: CPT | Mod: 25

## 2017-08-11 RX ORDER — LEVOTHYROXINE SODIUM 125 MCG
100 TABLET ORAL DAILY
Qty: 0 | Refills: 0 | Status: DISCONTINUED | OUTPATIENT
Start: 2017-08-11 | End: 2017-08-15

## 2017-08-11 RX ORDER — ENOXAPARIN SODIUM 100 MG/ML
40 INJECTION SUBCUTANEOUS DAILY
Qty: 0 | Refills: 0 | Status: DISCONTINUED | OUTPATIENT
Start: 2017-08-11 | End: 2017-08-11

## 2017-08-11 RX ORDER — MORPHINE SULFATE 50 MG/1
4 CAPSULE, EXTENDED RELEASE ORAL ONCE
Qty: 0 | Refills: 0 | Status: DISCONTINUED | OUTPATIENT
Start: 2017-08-11 | End: 2017-08-11

## 2017-08-11 RX ORDER — ASPIRIN/CALCIUM CARB/MAGNESIUM 324 MG
324 TABLET ORAL ONCE
Qty: 0 | Refills: 0 | Status: COMPLETED | OUTPATIENT
Start: 2017-08-11 | End: 2017-08-11

## 2017-08-11 RX ORDER — GLUCAGON INJECTION, SOLUTION 0.5 MG/.1ML
1 INJECTION, SOLUTION SUBCUTANEOUS ONCE
Qty: 0 | Refills: 0 | Status: DISCONTINUED | OUTPATIENT
Start: 2017-08-11 | End: 2017-08-15

## 2017-08-11 RX ORDER — DEXTROSE 50 % IN WATER 50 %
25 SYRINGE (ML) INTRAVENOUS ONCE
Qty: 0 | Refills: 0 | Status: DISCONTINUED | OUTPATIENT
Start: 2017-08-11 | End: 2017-08-15

## 2017-08-11 RX ORDER — OXYCODONE AND ACETAMINOPHEN 5; 325 MG/1; MG/1
1 TABLET ORAL EVERY 6 HOURS
Qty: 0 | Refills: 0 | Status: DISCONTINUED | OUTPATIENT
Start: 2017-08-11 | End: 2017-08-15

## 2017-08-11 RX ORDER — HYDROMORPHONE HYDROCHLORIDE 2 MG/ML
0.5 INJECTION INTRAMUSCULAR; INTRAVENOUS; SUBCUTANEOUS THREE TIMES A DAY
Qty: 0 | Refills: 0 | Status: DISCONTINUED | OUTPATIENT
Start: 2017-08-11 | End: 2017-08-15

## 2017-08-11 RX ORDER — ATORVASTATIN CALCIUM 80 MG/1
10 TABLET, FILM COATED ORAL AT BEDTIME
Qty: 0 | Refills: 0 | Status: DISCONTINUED | OUTPATIENT
Start: 2017-08-11 | End: 2017-08-15

## 2017-08-11 RX ORDER — SODIUM CHLORIDE 9 MG/ML
1000 INJECTION, SOLUTION INTRAVENOUS
Qty: 0 | Refills: 0 | Status: DISCONTINUED | OUTPATIENT
Start: 2017-08-11 | End: 2017-08-15

## 2017-08-11 RX ORDER — DEXTROSE 50 % IN WATER 50 %
12.5 SYRINGE (ML) INTRAVENOUS ONCE
Qty: 0 | Refills: 0 | Status: DISCONTINUED | OUTPATIENT
Start: 2017-08-11 | End: 2017-08-15

## 2017-08-11 RX ORDER — INSULIN LISPRO 100/ML
VIAL (ML) SUBCUTANEOUS AT BEDTIME
Qty: 0 | Refills: 0 | Status: DISCONTINUED | OUTPATIENT
Start: 2017-08-11 | End: 2017-08-15

## 2017-08-11 RX ORDER — AMLODIPINE BESYLATE 2.5 MG/1
10 TABLET ORAL DAILY
Qty: 0 | Refills: 0 | Status: DISCONTINUED | OUTPATIENT
Start: 2017-08-11 | End: 2017-08-15

## 2017-08-11 RX ORDER — DEXTROSE 50 % IN WATER 50 %
1 SYRINGE (ML) INTRAVENOUS ONCE
Qty: 0 | Refills: 0 | Status: DISCONTINUED | OUTPATIENT
Start: 2017-08-11 | End: 2017-08-15

## 2017-08-11 RX ORDER — HYDROCHLOROTHIAZIDE 25 MG
12.5 TABLET ORAL DAILY
Qty: 0 | Refills: 0 | Status: DISCONTINUED | OUTPATIENT
Start: 2017-08-11 | End: 2017-08-15

## 2017-08-11 RX ORDER — INSULIN LISPRO 100/ML
VIAL (ML) SUBCUTANEOUS
Qty: 0 | Refills: 0 | Status: DISCONTINUED | OUTPATIENT
Start: 2017-08-11 | End: 2017-08-15

## 2017-08-11 RX ORDER — LIDOCAINE 4 G/100G
1 CREAM TOPICAL ONCE
Qty: 0 | Refills: 0 | Status: COMPLETED | OUTPATIENT
Start: 2017-08-11 | End: 2017-08-11

## 2017-08-11 RX ORDER — VALSARTAN 80 MG/1
320 TABLET ORAL DAILY
Qty: 0 | Refills: 0 | Status: DISCONTINUED | OUTPATIENT
Start: 2017-08-11 | End: 2017-08-15

## 2017-08-11 RX ORDER — ACETAMINOPHEN 500 MG
1000 TABLET ORAL ONCE
Qty: 0 | Refills: 0 | Status: COMPLETED | OUTPATIENT
Start: 2017-08-11 | End: 2017-08-11

## 2017-08-11 RX ADMIN — Medication 324 MILLIGRAM(S): at 09:51

## 2017-08-11 RX ADMIN — HYDROMORPHONE HYDROCHLORIDE 0.5 MILLIGRAM(S): 2 INJECTION INTRAMUSCULAR; INTRAVENOUS; SUBCUTANEOUS at 20:10

## 2017-08-11 RX ADMIN — OXYCODONE AND ACETAMINOPHEN 1 TABLET(S): 5; 325 TABLET ORAL at 15:14

## 2017-08-11 RX ADMIN — AMLODIPINE BESYLATE 10 MILLIGRAM(S): 2.5 TABLET ORAL at 14:44

## 2017-08-11 RX ADMIN — VALSARTAN 320 MILLIGRAM(S): 80 TABLET ORAL at 17:40

## 2017-08-11 RX ADMIN — Medication 100 MICROGRAM(S): at 14:43

## 2017-08-11 RX ADMIN — Medication 400 MILLIGRAM(S): at 10:48

## 2017-08-11 RX ADMIN — LIDOCAINE 1 PATCH: 4 CREAM TOPICAL at 10:48

## 2017-08-11 RX ADMIN — OXYCODONE AND ACETAMINOPHEN 1 TABLET(S): 5; 325 TABLET ORAL at 14:44

## 2017-08-11 RX ADMIN — Medication 1: at 22:05

## 2017-08-11 RX ADMIN — Medication 12.5 MILLIGRAM(S): at 14:44

## 2017-08-11 RX ADMIN — Medication 3: at 17:33

## 2017-08-11 RX ADMIN — ATORVASTATIN CALCIUM 10 MILLIGRAM(S): 80 TABLET, FILM COATED ORAL at 21:42

## 2017-08-11 RX ADMIN — MORPHINE SULFATE 4 MILLIGRAM(S): 50 CAPSULE, EXTENDED RELEASE ORAL at 09:51

## 2017-08-11 RX ADMIN — LIDOCAINE 1 PATCH: 4 CREAM TOPICAL at 22:09

## 2017-08-11 RX ADMIN — HYDROMORPHONE HYDROCHLORIDE 0.5 MILLIGRAM(S): 2 INJECTION INTRAMUSCULAR; INTRAVENOUS; SUBCUTANEOUS at 19:53

## 2017-08-11 NOTE — H&P ADULT - HISTORY OF PRESENT ILLNESS
65 y/o female w hx of HTN, DM who presents to the ED for left shoulder pain. Patient reports pain started ~1 week ago while reaching for something.  Has been worsening over the past week and more recent development of SOB. Given morphine in ER and still c/o pain.  Denies chest pain.  Pain is worse moving head/neck or LUE.  Also worse on deep inspiration.

## 2017-08-11 NOTE — ED PROVIDER NOTE - ATTENDING CONTRIBUTION TO CARE
I have seen and evaluated this patient with the [place] practice clinician.   I agree with the findings  unless other wise stated. I have amended notes where needed.  After my face to face bedside evaluation, I am noting: I had a detailed discussion with the patient and/or guardian regarding the historical points, exam findings, and any diagnostic results supporting the discharge/admit diagnosis.concern for ACS v/s aortic dissection v/s  left shoulder pathology labs xrays pain control admission-- Fernandes

## 2017-08-11 NOTE — CONSULT NOTE ADULT - SUBJECTIVE AND OBJECTIVE BOX
Orthopaedic Surgery Consult Note    Patient is a 66y old  Female who presents with a chief complaint of L shoulder pain (11 Aug 2017 10:15)    HPI:  67 y/o female w hx of HTN, DM who presents to the ED for left shoulder pain. Patient reports pain started ~1 week ago while reaching for something.  Has been worsening over the past week and more recent development of SOB. Given morphine in ER and still c/o pain.  Denies chest pain.  Pain is worse moving head/neck or LUE.  Also worse on deep inspiration.  No fevers/chills.  No recent history of trauma.     PAST MEDICAL & SURGICAL HISTORY:  Hyperthyroidism  Diabetes mellitus  Hypertension    [] No significant past history as reviewed with the patient and family    FAMILY HISTORY:    [] Family history not pertinent as reviewed with the patient and family    SOCIAL HISTORY:    MEDICATIONS  (STANDING):    MEDICATIONS  (PRN):    Allergies    No Known Allergies    Intolerances        REVIEW OF SYSTEMS  [x] All review of systems negative except for those marked.  Systemic:	[] Fever		[] Chills		[] Night sweats		[] Fatigue	[] Other  [] Cardiovascular:  [] Pulmonary:  [] Renal/Urologic:  [] Gastrointestinal:  [] Metabolic:  [] Neurologic:  [] Hematologic:  [] ENT:  [] Ophthalmologic:  [] Musculoskeletal: see HPI    Vital Signs Last 24 Hrs  T(C): 36.8 (11 Aug 2017 09:01), Max: 36.8 (11 Aug 2017 09:01)  T(F): 98.2 (11 Aug 2017 09:01), Max: 98.2 (11 Aug 2017 09:01)  HR: 88 (11 Aug 2017 12:38) (86 - 88)  BP: 137/84 (11 Aug 2017 12:38) (137/84 - 168/109)  BP(mean): --  RR: 18 (11 Aug 2017 12:38) (16 - 18)  SpO2: 100% (11 Aug 2017 12:38) (99% - 100%)      PHYSICAL EXAM:  NAD  LUE: Swollen left shoulder.  No overlying erythema or warmth.  Pain with ROM abduction and flexion past 30 degrees.  AIN/PIN/U intact  SILT M/U/R  WWp distally.                          13.7   14.0  )-----------( 178      ( 11 Aug 2017 09:35 )             40.5     08-11    134<L>  |  95<L>  |  28<H>  ----------------------------<  262<H>  4.7   |  20<L>  |  1.07    Ca    9.4      11 Aug 2017 09:35    TPro  7.5  /  Alb  4.1  /  TBili  0.7  /  DBili  x   /  AST  96<H>  /  ALT  92<H>  /  AlkPhos  120  08-11    PT/INR - ( 11 Aug 2017 09:35 )   PT: 11.8 sec;   INR: 1.08 ratio         PTT - ( 11 Aug 2017 09:35 )  PTT:29.6 sec      IMAGING STUDIES:  Xray left shoulder: No fracture or dislocation  CT CHest: No fracture or dislocation of left shoulder    66y Female with left shuolder pain  Shoulder aspiration perfformed under sterile conditions.  10 cc of serosanguinous fluid removed.  Aspiration culture/crystals/gram stain/ cell count  NPO while aspiration results pending  WBAT LUE  NSAIDS if patient can tolerate  Pain Control  Will Follow

## 2017-08-11 NOTE — ED PROVIDER NOTE - OBJECTIVE STATEMENT
65 y/o female w hx of HTN, DM who presents to the ED for left arm pain. patient reports worsening over the last week and more recent development of SOB, chest pain with radiation to the shoulder and back. + paresthesias in the arm. no relief with excedrin    pmd- [facility]  cardiology helen

## 2017-08-11 NOTE — ED ADULT NURSE NOTE - OBJECTIVE STATEMENT
66 y.o female c c/o L arm pain/numbness. Pain began last night around 7pm. Unable to sleep d/t pain. Pt denies injury to neck/arm. Pain worsens upon deep inspiration. + chest pain. Sudden onset of SOB. Pressure from arm radiates into back and shoulder. Afebrile. Non diaphoretic. abd soft. No lower extremity edema. Ambulatory. Family at bedside. vomited 3x today.

## 2017-08-11 NOTE — CONSULT NOTE ADULT - ASSESSMENT
PT  WITH  LEFT  SHOULDER  PAIN,  HIGH  WBC    NEED TO  R/O  SEPTIC  ARTHRITIS  CHECK  XRAY,  ORTHO   AND  ID  TO  SEE PT,   PAIN MEDS,  DVT  PPX,  HTN,  DM, PT  WITH  LEFT  SHOULDER  PAIN,  HIGH  WBC    NEED TO  R/O  SEPTIC  ARTHRITIS  CHECK  XRAY,  ORTHO   AND  ID  TO  SEE PT,   PAIN MEDS,  DVT  PPX,  HTN,  DM,    high  lfts,  follow  in am'    abdominal  u/s

## 2017-08-11 NOTE — ED PROVIDER NOTE - MEDICAL DECISION MAKING DETAILS
Pt with poorl;y controlled blood pressure CAD left chest pain and radiation to Lt arm no diaphoresis Heart and lungs wnl left shoulder tender anteriorly markedly reduced ROM lt shoulder concern for

## 2017-08-11 NOTE — CHART NOTE - NSCHARTNOTEFT_GEN_A_CORE
Aspiration results reviewed.  Cell count [id number].  Gram stain negative.    Discussed with attending.  Likely due to arthritis.  Will follow cultures.  Observe off antibiotics.  Reg Diet  DVT Ppx    Leonor PÉREZ [id number]

## 2017-08-11 NOTE — PATIENT PROFILE ADULT. - NS TRANSFER PATIENT BELONGINGS
Cell Phone/PDA (specify)/silver pandora brabrainet; medications - no controlled substances - instructed to send home c family/Clothing/Jewelry/Money (specify)

## 2017-08-11 NOTE — ED PROVIDER NOTE - DIAGNOSIS COUNSELING, MDM
conducted a detailed discussion... I had a detailed discussion with the patient and/or guardian regarding the historical points, exam findings, and any diagnostic results supporting the discharge/admit diagnosis.concern for ACS v/s aortic dissection v/s  left shoulder pathology labs xrays pain control admission-- Fernandes

## 2017-08-11 NOTE — CONSULT NOTE ADULT - SUBJECTIVE AND OBJECTIVE BOX
67 y/o female w hx of HTN, DM who presents to the ED for left arm pain. patient reports worsening over the last week and more recent development of SOB, chest pain with radiation to the shoulder and back. + paresthesias in the arm. no relief with excedrin,  , seen  in  er,  pt  with  pain,  8/10,  crying,   unable  to raise  arm pmd- [facility]	  REVIEW OF SYSTEMS:    CONSTITUTIONAL: No weakness, fevers or chills  EYES/ENT: No visual changes;  No vertigo or throat pain   NECK: No pain or stiffness  RESPIRATORY: No cough, wheezing, hemoptysis; No shortness of breath  CARDIOVASCULAR: No chest pain or palpitations  GASTROINTESTINAL: No abdominal or epigastric pain. No nausea, vomiting, or hematemesis; No diarrhea or constipation. No melena or hematochezia.  GENITOURINARY: No dysuria, frequency or hematuria  NEUROLOGICAL: No numbness or weakness  SKIN: No itching, rashes  PHYSICAL EXAMINATION:  Vital Signs Last 24 Hrs  T(C): 36.8 (11 Aug 2017 09:01), Max: 36.8 (11 Aug 2017 09:01)  T(F): 98.2 (11 Aug 2017 09:01), Max: 98.2 (11 Aug 2017 09:01)  HR: 86 (11 Aug 2017 09:01) (86 - 86)  BP: 168/109 (11 Aug 2017 09:01) (168/109 - 168/109)  BP(mean): --  RR: 16 (11 Aug 2017 09:01) (16 - 16)  SpO2: 99% (11 Aug 2017 09:01) (99% - 99%)  CAPILLARY BLOOD GLUCOSE            GENERAL: NAD, well-groomed, well-developed  HEAD:  atraumatic, normocephalic  EYES: sclera anicteric  ENMT: mucous membranes moist  NECK: supple, No JVD  CHEST/LUNG: clear to auscultation bilaterally; no rales, rhonchi, or wheezing b/l  HEART: normal S1, S2  ABDOMEN: BS+, soft, ND, NT   EXTREMITIES:  pulses palpable; no clubbing, cyanosis, or edema b/l LEs  NEURO: awake, alert, interactive; moves all extremities  SKIN: no rashes or lesions  . left  shoulder  pain, pain on merely  tosuching        LABS:                        13.7   14.0  )-----------( 178      ( 11 Aug 2017 09:35 )             40.5     08-11    134<L>  |  95<L>  |  28<H>  ----------------------------<  262<H>  4.7   |  20<L>  |  1.07    Ca    9.4      11 Aug 2017 09:35    TPro  7.5  /  Alb  4.1  /  TBili  0.7  /  DBili  x   /  AST  96<H>  /  ALT  92<H>  /  AlkPhos  120  08-11    PT/INR - ( 11 Aug 2017 09:35 )   PT: 11.8 sec;   INR: 1.08 ratio         PTT - ( 11 Aug 2017 09:35 )  PTT:29.6 sec        RADIOLOGY & ADDITIONAL TESTS:

## 2017-08-11 NOTE — H&P ADULT - ASSESSMENT
66F with left shoulder pain, likely musculoskeletal  doubt angina but will rule out MI with CE given risk factors  CTA pending  pain control  clarify and continue home meds  ortho eval

## 2017-08-11 NOTE — ED PROVIDER NOTE - CARE PLAN
Principal Discharge DX:	Chest pain  Secondary Diagnosis:	Essential hypertension  Secondary Diagnosis:	Diabetes mellitus

## 2017-08-12 LAB
ANION GAP SERPL CALC-SCNC: 13 MMOL/L — SIGNIFICANT CHANGE UP (ref 5–17)
BUN SERPL-MCNC: 25 MG/DL — HIGH (ref 7–23)
CALCIUM SERPL-MCNC: 9.4 MG/DL — SIGNIFICANT CHANGE UP (ref 8.4–10.5)
CHLORIDE SERPL-SCNC: 98 MMOL/L — SIGNIFICANT CHANGE UP (ref 96–108)
CK MB BLD-MCNC: 2.2 % — SIGNIFICANT CHANGE UP (ref 0–3.5)
CK MB CFR SERPL CALC: 1.4 NG/ML — SIGNIFICANT CHANGE UP (ref 0–3.8)
CK SERPL-CCNC: 57 U/L — SIGNIFICANT CHANGE UP (ref 25–170)
CO2 SERPL-SCNC: 24 MMOL/L — SIGNIFICANT CHANGE UP (ref 22–31)
CREAT SERPL-MCNC: 1 MG/DL — SIGNIFICANT CHANGE UP (ref 0.5–1.3)
GLUCOSE SERPL-MCNC: 288 MG/DL — HIGH (ref 70–99)
HBA1C BLD-MCNC: 7.4 % — HIGH (ref 4–5.6)
HCT VFR BLD CALC: 36.6 % — SIGNIFICANT CHANGE UP (ref 34.5–45)
HGB BLD-MCNC: 12.4 G/DL — SIGNIFICANT CHANGE UP (ref 11.5–15.5)
MCHC RBC-ENTMCNC: 32.7 PG — SIGNIFICANT CHANGE UP (ref 27–34)
MCHC RBC-ENTMCNC: 33.8 GM/DL — SIGNIFICANT CHANGE UP (ref 32–36)
MCV RBC AUTO: 96.8 FL — SIGNIFICANT CHANGE UP (ref 80–100)
PLATELET # BLD AUTO: 160 K/UL — SIGNIFICANT CHANGE UP (ref 150–400)
POTASSIUM SERPL-MCNC: 4 MMOL/L — SIGNIFICANT CHANGE UP (ref 3.5–5.3)
POTASSIUM SERPL-SCNC: 4 MMOL/L — SIGNIFICANT CHANGE UP (ref 3.5–5.3)
RBC # BLD: 3.78 M/UL — LOW (ref 3.8–5.2)
RBC # FLD: 12.7 % — SIGNIFICANT CHANGE UP (ref 10.3–14.5)
SODIUM SERPL-SCNC: 135 MMOL/L — SIGNIFICANT CHANGE UP (ref 135–145)
TROPONIN T SERPL-MCNC: <0.01 NG/ML — SIGNIFICANT CHANGE UP (ref 0–0.06)
TROPONIN T SERPL-MCNC: <0.01 NG/ML — SIGNIFICANT CHANGE UP (ref 0–0.06)
TSH SERPL-MCNC: 2.24 UIU/ML — SIGNIFICANT CHANGE UP (ref 0.27–4.2)
WBC # BLD: 13.6 K/UL — HIGH (ref 3.8–10.5)
WBC # FLD AUTO: 13.6 K/UL — HIGH (ref 3.8–10.5)

## 2017-08-12 PROCEDURE — 99222 1ST HOSP IP/OBS MODERATE 55: CPT | Mod: GC

## 2017-08-12 RX ORDER — CEFTRIAXONE 500 MG/1
2 INJECTION, POWDER, FOR SOLUTION INTRAMUSCULAR; INTRAVENOUS EVERY 24 HOURS
Qty: 0 | Refills: 0 | Status: DISCONTINUED | OUTPATIENT
Start: 2017-08-12 | End: 2017-08-14

## 2017-08-12 RX ORDER — VANCOMYCIN HCL 1 G
1000 VIAL (EA) INTRAVENOUS EVERY 12 HOURS
Qty: 0 | Refills: 0 | Status: DISCONTINUED | OUTPATIENT
Start: 2017-08-12 | End: 2017-08-14

## 2017-08-12 RX ORDER — IBUPROFEN 200 MG
400 TABLET ORAL EVERY 8 HOURS
Qty: 0 | Refills: 0 | Status: DISCONTINUED | OUTPATIENT
Start: 2017-08-12 | End: 2017-08-13

## 2017-08-12 RX ORDER — ENOXAPARIN SODIUM 100 MG/ML
40 INJECTION SUBCUTANEOUS DAILY
Qty: 0 | Refills: 0 | Status: DISCONTINUED | OUTPATIENT
Start: 2017-08-12 | End: 2017-08-15

## 2017-08-12 RX ORDER — ACETAMINOPHEN 500 MG
650 TABLET ORAL EVERY 6 HOURS
Qty: 0 | Refills: 0 | Status: DISCONTINUED | OUTPATIENT
Start: 2017-08-12 | End: 2017-08-15

## 2017-08-12 RX ORDER — CEFTRIAXONE 500 MG/1
1 INJECTION, POWDER, FOR SOLUTION INTRAMUSCULAR; INTRAVENOUS EVERY 24 HOURS
Qty: 0 | Refills: 0 | Status: DISCONTINUED | OUTPATIENT
Start: 2017-08-12 | End: 2017-08-12

## 2017-08-12 RX ADMIN — Medication 3: at 12:27

## 2017-08-12 RX ADMIN — OXYCODONE AND ACETAMINOPHEN 1 TABLET(S): 5; 325 TABLET ORAL at 04:22

## 2017-08-12 RX ADMIN — OXYCODONE AND ACETAMINOPHEN 1 TABLET(S): 5; 325 TABLET ORAL at 21:40

## 2017-08-12 RX ADMIN — OXYCODONE AND ACETAMINOPHEN 1 TABLET(S): 5; 325 TABLET ORAL at 21:10

## 2017-08-12 RX ADMIN — Medication 400 MILLIGRAM(S): at 18:14

## 2017-08-12 RX ADMIN — Medication 1: at 17:09

## 2017-08-12 RX ADMIN — OXYCODONE AND ACETAMINOPHEN 1 TABLET(S): 5; 325 TABLET ORAL at 10:00

## 2017-08-12 RX ADMIN — VALSARTAN 320 MILLIGRAM(S): 80 TABLET ORAL at 05:12

## 2017-08-12 RX ADMIN — Medication 250 MILLIGRAM(S): at 18:45

## 2017-08-12 RX ADMIN — Medication 100 MICROGRAM(S): at 05:12

## 2017-08-12 RX ADMIN — ATORVASTATIN CALCIUM 10 MILLIGRAM(S): 80 TABLET, FILM COATED ORAL at 21:10

## 2017-08-12 RX ADMIN — Medication 400 MILLIGRAM(S): at 15:17

## 2017-08-12 RX ADMIN — OXYCODONE AND ACETAMINOPHEN 1 TABLET(S): 5; 325 TABLET ORAL at 10:17

## 2017-08-12 RX ADMIN — Medication 650 MILLIGRAM(S): at 17:11

## 2017-08-12 RX ADMIN — OXYCODONE AND ACETAMINOPHEN 1 TABLET(S): 5; 325 TABLET ORAL at 03:22

## 2017-08-12 RX ADMIN — CEFTRIAXONE 100 GRAM(S): 500 INJECTION, POWDER, FOR SOLUTION INTRAMUSCULAR; INTRAVENOUS at 10:46

## 2017-08-12 RX ADMIN — Medication 12.5 MILLIGRAM(S): at 05:12

## 2017-08-12 RX ADMIN — AMLODIPINE BESYLATE 10 MILLIGRAM(S): 2.5 TABLET ORAL at 05:12

## 2017-08-12 RX ADMIN — ENOXAPARIN SODIUM 40 MILLIGRAM(S): 100 INJECTION SUBCUTANEOUS at 11:36

## 2017-08-12 RX ADMIN — Medication 650 MILLIGRAM(S): at 18:15

## 2017-08-12 NOTE — CONSULT NOTE ADULT - ASSESSMENT
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 Send blood cultures, Lyme serology  Recommend MRI of shoulder joint, PT eval after MRI

## 2017-08-12 NOTE — PROGRESS NOTE ADULT - ASSESSMENT
left  shoulder  pain, betetr    no  fx on xray    ct  chest, no  dissection   HYPONATREMIA   LABS  PENDING TODAY    HTN,  DM   FOLLOW  WBC     HOUSE  ID  TO BE CALLED,  NEED TO  R/O  SEPTIC ARTHRITIS  HIGH   WBC

## 2017-08-12 NOTE — CONSULT NOTE ADULT - SUBJECTIVE AND OBJECTIVE BOX
HPI:  65 y/o female w hx of HTN, DM who presents to the ED for left shoulder pain. Patient reports pain started ~1 week ago while reaching for something.  Has been worsening over the past week and more recent development of SOB. Given morphine in ER and still c/o pain.  Denies chest pain.  Pain is worse moving head/neck or LUE.  Also worse on deep inspiration. (11 Aug 2017 10:15)  Admitted with concern for ACS, ruled out with negative CE. CTA chest and abdomen negative. On admission with leukocytosis 14 and ESR 31, c/o left shoulder pain. Underwent arthrocentesis via ORTHO, synovial fluid showing [id number] nucleated cells with 97% neutrophils, [id number] RBC. Gram stain negative for organisms.      PAST MEDICAL & SURGICAL HISTORY:  Hyperthyroidism  Diabetes mellitus  Hypertension      Allergies  No Known Allergies        ANTIMICROBIALS:  cefTRIAXone   IVPB 1 every 24 hours      OTHER MEDS: MEDICATIONS  (STANDING):  valsartan 320 daily  levothyroxine 100 daily  atorvastatin 10 at bedtime  amLODIPine   Tablet 10 daily  hydrochlorothiazide 12.5 daily  HYDROmorphone  Injectable 0.5 three times a day PRN  oxyCODONE    5 mG/acetaminophen 325 mG 1 every 6 hours PRN  insulin lispro (HumaLOG) corrective regimen sliding scale  three times a day before meals  dextrose Gel 1 once PRN  dextrose 50% Injectable 12.5 once  dextrose 50% Injectable 25 once  dextrose 50% Injectable 25 once  glucagon  Injectable 1 once PRN  insulin lispro (HumaLOG) corrective regimen sliding scale  at bedtime  dextrose Gel 1 once PRN  dextrose 50% Injectable 12.5 once  dextrose 50% Injectable 25 once  dextrose 50% Injectable 25 once  glucagon  Injectable 1 once PRN  acetaminophen   Tablet. 650 every 6 hours PRN  ibuprofen  Tablet 400 every 8 hours PRN  enoxaparin Injectable 40 daily      SOCIAL HISTORY:  [ ] etoh [ ] tobacco [ ] former smoker [ ] IVDU    FAMILY HISTORY:      REVIEW OF SYSTEMS  [  ] ROS unobtainable because:    [  ] All other systems negative except as noted below:	    Constitutional:  [ ] fever [ ] weight loss  Skin:  [ ] rash [ ] phlebitis	  Eyes: [ ] icterus [ ] inflammation	  ENMT: [ ] discharge [ ] thrush [ ] ulcers [ ] exudates  Respiratory: [ ] dyspnea [ ] hemoptysis [ ] cough [ ] sputum	  Cardiovascular:  [ ] chest pain [ ] palpitations [ ] edema	  Gastrointestinal:  [ ] nausea [ ] vomiting [ ] diarrhea [ ] constipation [ ] pain	  Genitourinary:  [ ] dysuria [ ] frequency [ ] hematuria [ ] discharge [ ] flank pain  Musculoskeletal:  [ ] myalgias [ ] arthralgias [ ] arthritis	  Neurological:  [ ] headache [ ] seizures	  Psychiatric:  [ ] anxiety [ ] depression	  Hematology/Lymphatics:  [ ] lymphadenopathy  Endocrine:  [ ] adrenal [ ] thyroid  Allergic/Immunologic:	 [ ] transplant [ ] seasonal    Vital Signs Last 24 Hrs  T(F): 98.3 (08-12-17 @ 13:46), Max: 99.5 (08-12-17 @ 03:54)    Vital Signs Last 24 Hrs  HR: 86 (08-12-17 @ 13:46) (80 - 91)  BP: 113/68 (08-12-17 @ 13:46) (111/71 - 142/83)  RR: 18 (08-12-17 @ 13:46)  SpO2: 93% (08-12-17 @ 13:46) (93% - 95%)  Wt(kg): --    PHYSICAL EXAM:  General: non-toxic  HEAD/EYES: anicteric, PERRL  ENT:  supple  Cardiovascular:   S1, S2  Respiratory:  clear bilaterally  GI:  soft, non-tender, normal bowel sounds  :  no CVA tenderness   Musculoskeletal:  no synovitis  Neurologic:  grossly non-focal  Skin:  no rash  Lymph: no lymphadenopathy  Psychiatric:  appropriate affect  Vascular:  no phlebitis                                12.4   13.6  )-----------( 160      ( 12 Aug 2017 10:17 )             36.6       08-12    135  |  98  |  25<H>  ----------------------------<  288<H>  4.0   |  24  |  1.00    Ca    9.4      12 Aug 2017 10:17    TPro  7.5  /  Alb  4.1  /  TBili  0.7  /  DBili  x   /  AST  96<H>  /  ALT  92<H>  /  AlkPhos  120  08-11          MICROBIOLOGY:    Culture - Body Fluid with Gram Stain (08.11.17 @ 18:29)    Gram Stain:   Numerous polymorphonuclear leukocytes per low power field  No organisms seen per oil power field    Specimen Source: .Body Fluid Synovial Fluid    Gram Stain - RRL (08.11.17 @ 12:59)    Gram Stain - RRL:    Source:       SYNOVIAL FLUID   ---------- STAINS /PREPS REPORT ----------  NO ORGANISMS SEEN  NUMEROUS WBC'S SEEN  FEW RBC'S SEEN Reference Range: No Organisms seen          v            RADIOLOGY:  < from: Xray Shoulder 2 Views, Left (08.11.17 @ 11:41) >  INTERPRETATION:  Indication: Left shoulder pain.    Technique: 3 views of the left shoulder were obtained.    Comparison: None.    Findings: Nofracture or dislocation is seen in the left shoulder. The   joint spaces are intact. No discrete osseous lesion is seen. The soft   tissues appear unremarkable.    Impression: No fracture or dislocation visualized in the left shoulder.    < end of copied text >  < from: CT Angio Abdomen and Pelvis w/ IV Cont (08.11.17 @ 11:18) >    IMPRESSION: No evidence for aortic dissection.    Multiple stable subpleural and perifissural nodules, likely representing   intrapulmonary lymph nodes.    < end of copied text > HPI:  67 y/o female w hx of HTN, DM who presents to the ED for left shoulder pain. Patient reports pain started ~1 week ago while reaching for something.  Has been worsening over the past week and more recent development of SOB. Given morphine in ER and still c/o pain.  Denies chest pain.  Pain is worse moving head/neck or LUE.  Also worse on deep inspiration. (11 Aug 2017 10:15)  Admitted with concern for ACS, ruled out with negative CE. CTA chest and abdomen negative. On admission with leukocytosis 14 and ESR 31, c/o left shoulder pain, no h/o trauma or fever but thinks the pain may have started after lifting heavy files. Underwent arthrocentesis via ORTHO, synovial fluid showing [id number] nucleated cells with 97% neutrophils, 31661 RBC. Gram stain negative for organisms.  Reports history of typical Bulls eye rash with concern for Lymes many years ago, does not recall being treated for it.    PAST MEDICAL & SURGICAL HISTORY:  Hyperthyroidism  Diabetes mellitus  Hypertension      Allergies  No Known Allergies        ANTIMICROBIALS:  cefTRIAXone   IVPB 1 every 24 hours      OTHER MEDS: MEDICATIONS  (STANDING):  valsartan 320 daily  levothyroxine 100 daily  atorvastatin 10 at bedtime  amLODIPine   Tablet 10 daily  hydrochlorothiazide 12.5 daily  HYDROmorphone  Injectable 0.5 three times a day PRN  oxyCODONE    5 mG/acetaminophen 325 mG 1 every 6 hours PRN  insulin lispro (HumaLOG) corrective regimen sliding scale  three times a day before meals  dextrose Gel 1 once PRN  dextrose 50% Injectable 12.5 once  dextrose 50% Injectable 25 once  dextrose 50% Injectable 25 once  glucagon  Injectable 1 once PRN  insulin lispro (HumaLOG) corrective regimen sliding scale  at bedtime  dextrose Gel 1 once PRN  dextrose 50% Injectable 12.5 once  dextrose 50% Injectable 25 once  dextrose 50% Injectable 25 once  glucagon  Injectable 1 once PRN  acetaminophen   Tablet. 650 every 6 hours PRN  ibuprofen  Tablet 400 every 8 hours PRN  enoxaparin Injectable 40 daily      SOCIAL HISTORY:  [ ] etoh [ ] tobacco [ ] former smoker [ ] IVDU    FAMILY HISTORY:      REVIEW OF SYSTEMS  [  ] ROS unobtainable because:    [x  ] All other systems negative except as noted below:	    Constitutional:  [ ] fever [ ] weight loss  Skin:  [ ] rash [ ] phlebitis	  Eyes: [ ] icterus [ ] inflammation	  ENMT: [ ] discharge [ ] thrush [ ] ulcers [ ] exudates  Respiratory: [ ] dyspnea [ ] hemoptysis [ ] cough [ ] sputum	  Cardiovascular:  [ ] chest pain [ ] palpitations [ ] edema	  Gastrointestinal:  [ ] nausea [ ] vomiting [ ] diarrhea [ ] constipation [ ] pain	  Genitourinary:  [ ] dysuria [ ] frequency [ ] hematuria [ ] discharge [ ] flank pain  Musculoskeletal:  [ ] myalgias [x ] arthralgias [ ] arthritis	  Neurological:  [ ] headache [ ] seizures	  Psychiatric:  [ ] anxiety [ ] depression	  Hematology/Lymphatics:  [ ] lymphadenopathy  Endocrine:  [ ] adrenal [ ] thyroid  Allergic/Immunologic:	 [ ] transplant [ ] seasonal    Vital Signs Last 24 Hrs  T(F): 98.3 (08-12-17 @ 13:46), Max: 99.5 (08-12-17 @ 03:54)    Vital Signs Last 24 Hrs  HR: 86 (08-12-17 @ 13:46) (80 - 91)  BP: 113/68 (08-12-17 @ 13:46) (111/71 - 142/83)  RR: 18 (08-12-17 @ 13:46)  SpO2: 93% (08-12-17 @ 13:46) (93% - 95%)  Wt(kg): --    PHYSICAL EXAM:  General: non-toxic  HEAD/EYES: anicteric, PERRL  ENT:  supple  Cardiovascular:   S1, S2  Respiratory:  clear bilaterally  GI:  soft, non-tender, normal bowel sounds  :  no CVA tenderness   Musculoskeletal:  left shoulder limited ROM, tenderness over left shoulder, sampling sites clean  Neurologic:  grossly non-focal  Skin:  no rash  Lymph: no lymphadenopathy  Psychiatric:  appropriate affect  Vascular:  no phlebitis                                12.4   13.6  )-----------( 160      ( 12 Aug 2017 10:17 )             36.6       08-12    135  |  98  |  25<H>  ----------------------------<  288<H>  4.0   |  24  |  1.00    Ca    9.4      12 Aug 2017 10:17    TPro  7.5  /  Alb  4.1  /  TBili  0.7  /  DBili  x   /  AST  96<H>  /  ALT  92<H>  /  AlkPhos  120  08-11          MICROBIOLOGY:    Culture - Body Fluid with Gram Stain (08.11.17 @ 18:29)    Gram Stain:   Numerous polymorphonuclear leukocytes per low power field  No organisms seen per oil power field    Specimen Source: .Body Fluid Synovial Fluid    Gram Stain - RRL (08.11.17 @ 12:59)    Gram Stain - RRL:    Source:       SYNOVIAL FLUID   ---------- STAINS /PREPS REPORT ----------  NO ORGANISMS SEEN  NUMEROUS WBC'S SEEN  FEW RBC'S SEEN Reference Range: No Organisms seen          v            RADIOLOGY:  < from: Xray Shoulder 2 Views, Left (08.11.17 @ 11:41) >  INTERPRETATION:  Indication: Left shoulder pain.    Technique: 3 views of the left shoulder were obtained.    Comparison: None.    Findings: Nofracture or dislocation is seen in the left shoulder. The   joint spaces are intact. No discrete osseous lesion is seen. The soft   tissues appear unremarkable.    Impression: No fracture or dislocation visualized in the left shoulder.    < end of copied text >  < from: CT Angio Abdomen and Pelvis w/ IV Cont (08.11.17 @ 11:18) >    IMPRESSION: No evidence for aortic dissection.    Multiple stable subpleural and perifissural nodules, likely representing   intrapulmonary lymph nodes.    < end of copied text > HPI:  65 y/o female w hx of HTN, DM who presents to the ED for left shoulder pain. Patient reports pain started ~1 week ago while reaching for something.  Has been worsening over the past week and more recent development of SOB. Given morphine in ER and still c/o pain.  Denies chest pain.  Pain is worse moving head/neck or LUE.  Also worse on deep inspiration. (11 Aug 2017 10:15)  Admitted with concern for ACS, ruled out with negative CE. CTA chest and abdomen negative. On admission with leukocytosis 14 and ESR 31, c/o left shoulder pain, no h/o trauma or fever but thinks the pain may have started after lifting heavy files. Underwent arthrocentesis via ORTHO, synovial fluid showing [id number] nucleated cells with 97% neutrophils, 65507 RBC. Gram stain negative for organisms.  Reports history of typical Bulls eye rash with concern for Lyme many years ago, does not recall being treated for it.    PAST MEDICAL & SURGICAL HISTORY:  Hyperthyroidism  Diabetes mellitus  Hypertension      Allergies  No Known Allergies        ANTIMICROBIALS:  cefTRIAXone   IVPB 1 every 24 hours      OTHER MEDS: MEDICATIONS  (STANDING):  valsartan 320 daily  levothyroxine 100 daily  atorvastatin 10 at bedtime  amLODIPine   Tablet 10 daily  hydrochlorothiazide 12.5 daily  HYDROmorphone  Injectable 0.5 three times a day PRN  oxyCODONE    5 mG/acetaminophen 325 mG 1 every 6 hours PRN  insulin lispro (HumaLOG) corrective regimen sliding scale  three times a day before meals  dextrose Gel 1 once PRN  dextrose 50% Injectable 12.5 once  dextrose 50% Injectable 25 once  dextrose 50% Injectable 25 once  glucagon  Injectable 1 once PRN  insulin lispro (HumaLOG) corrective regimen sliding scale  at bedtime  dextrose Gel 1 once PRN  dextrose 50% Injectable 12.5 once  dextrose 50% Injectable 25 once  dextrose 50% Injectable 25 once  glucagon  Injectable 1 once PRN  acetaminophen   Tablet. 650 every 6 hours PRN  ibuprofen  Tablet 400 every 8 hours PRN  enoxaparin Injectable 40 daily      SOCIAL HISTORY:  [ ] etoh [ ] tobacco [ ] former smoker [ ] IVDU    FAMILY HISTORY:      REVIEW OF SYSTEMS  [  ] ROS unobtainable because:    [x  ] All other systems negative except as noted below:	    Constitutional:  [ ] fever [ ] weight loss  Skin:  [ ] rash [ ] phlebitis	  Eyes: [ ] icterus [ ] inflammation	  ENMT: [ ] discharge [ ] thrush [ ] ulcers [ ] exudates  Respiratory: [ ] dyspnea [ ] hemoptysis [ ] cough [ ] sputum	  Cardiovascular:  [ ] chest pain [ ] palpitations [ ] edema	  Gastrointestinal:  [ ] nausea [ ] vomiting [ ] diarrhea [ ] constipation [ ] pain	  Genitourinary:  [ ] dysuria [ ] frequency [ ] hematuria [ ] discharge [ ] flank pain  Musculoskeletal:  [ ] myalgias [x ] arthralgias [ ] arthritis	  Neurological:  [ ] headache [ ] seizures	  Psychiatric:  [ ] anxiety [ ] depression	  Hematology/Lymphatics:  [ ] lymphadenopathy  Endocrine:  [ ] adrenal [ ] thyroid  Allergic/Immunologic:	 [ ] transplant [ ] seasonal    Vital Signs Last 24 Hrs  T(F): 98.3 (08-12-17 @ 13:46), Max: 99.5 (08-12-17 @ 03:54)    Vital Signs Last 24 Hrs  HR: 86 (08-12-17 @ 13:46) (80 - 91)  BP: 113/68 (08-12-17 @ 13:46) (111/71 - 142/83)  RR: 18 (08-12-17 @ 13:46)  SpO2: 93% (08-12-17 @ 13:46) (93% - 95%)  Wt(kg): --    PHYSICAL EXAM:  General: non-toxic  HEAD/EYES: anicteric, PERRL  ENT:  supple  Cardiovascular:   S1, S2  Respiratory:  clear bilaterally  GI:  soft, non-tender, normal bowel sounds  :  no CVA tenderness   Musculoskeletal:  left shoulder limited ROM, tenderness over left shoulder, sampling sites clean  Neurologic:  grossly non-focal  Skin:  no rash  Lymph: no lymphadenopathy  Psychiatric:  appropriate affect  Vascular:  no phlebitis                                12.4   13.6  )-----------( 160      ( 12 Aug 2017 10:17 )             36.6       08-12    135  |  98  |  25<H>  ----------------------------<  288<H>  4.0   |  24  |  1.00    Ca    9.4      12 Aug 2017 10:17    TPro  7.5  /  Alb  4.1  /  TBili  0.7  /  DBili  x   /  AST  96<H>  /  ALT  92<H>  /  AlkPhos  120  08-11          MICROBIOLOGY:    Culture - Body Fluid with Gram Stain (08.11.17 @ 18:29)    Gram Stain:   Numerous polymorphonuclear leukocytes per low power field  No organisms seen per oil power field    Specimen Source: .Body Fluid Synovial Fluid    Gram Stain - RRL (08.11.17 @ 12:59)    Gram Stain - RRL:    Source:       SYNOVIAL FLUID   ---------- STAINS /PREPS REPORT ----------  NO ORGANISMS SEEN  NUMEROUS WBC'S SEEN  FEW RBC'S SEEN Reference Range: No Organisms seen          v            RADIOLOGY:  < from: Xray Shoulder 2 Views, Left (08.11.17 @ 11:41) >  INTERPRETATION:  Indication: Left shoulder pain.    Technique: 3 views of the left shoulder were obtained.    Comparison: None.    Findings: Nofracture or dislocation is seen in the left shoulder. The   joint spaces are intact. No discrete osseous lesion is seen. The soft   tissues appear unremarkable.    Impression: No fracture or dislocation visualized in the left shoulder.    < end of copied text >  < from: CT Angio Abdomen and Pelvis w/ IV Cont (08.11.17 @ 11:18) >    IMPRESSION: No evidence for aortic dissection.    Multiple stable subpleural and perifissural nodules, likely representing   intrapulmonary lymph nodes.    < end of copied text >

## 2017-08-12 NOTE — CONSULT NOTE ADULT - ATTENDING COMMENTS
Patient examined. Chart and X-rays reviewed. Agree with above note.    Rosalio Hart MD
Patient seen and examined with Dr. Gale. I agree with her history, exam and findings as noted above.  Patient with a possible past history of Lyme Disease presented with left shoulder pain (she is left handed) after lifting and reaching but with >100k WBCs >48k RBCs in joint fluid aspirate with cultures pending. A few calcium crystals seen in the fluid aspirate. No overlying joint warth or erythema, minimal swelling left>right    I doubt that the joint fluid is a septic arthritis but would  Send blood cultures x 2 sets  follow up fluid aspirate gram stain and culture  Can give empiric Vanco 1gram q 12hr and Ceftriaxone 2gm/day while waiting for the fluid cultures  Send tick born serologies for Lyme, etc.    ESR and CRP  would image with MRI of the shoulder area

## 2017-08-13 LAB
ANION GAP SERPL CALC-SCNC: 17 MMOL/L — SIGNIFICANT CHANGE UP (ref 5–17)
BUN SERPL-MCNC: 33 MG/DL — HIGH (ref 7–23)
CALCIUM SERPL-MCNC: 9.7 MG/DL — SIGNIFICANT CHANGE UP (ref 8.4–10.5)
CHLORIDE SERPL-SCNC: 101 MMOL/L — SIGNIFICANT CHANGE UP (ref 96–108)
CO2 SERPL-SCNC: 19 MMOL/L — LOW (ref 22–31)
CREAT SERPL-MCNC: 1.29 MG/DL — SIGNIFICANT CHANGE UP (ref 0.5–1.3)
GLUCOSE SERPL-MCNC: 122 MG/DL — HIGH (ref 70–99)
HCT VFR BLD CALC: 35.3 % — SIGNIFICANT CHANGE UP (ref 34.5–45)
HGB BLD-MCNC: 11.6 G/DL — SIGNIFICANT CHANGE UP (ref 11.5–15.5)
MAGNESIUM SERPL-MCNC: 2.1 MG/DL — SIGNIFICANT CHANGE UP (ref 1.6–2.6)
MCHC RBC-ENTMCNC: 30.9 PG — SIGNIFICANT CHANGE UP (ref 27–34)
MCHC RBC-ENTMCNC: 32.9 GM/DL — SIGNIFICANT CHANGE UP (ref 32–36)
MCV RBC AUTO: 93.9 FL — SIGNIFICANT CHANGE UP (ref 80–100)
PHOSPHATE SERPL-MCNC: 4.1 MG/DL — SIGNIFICANT CHANGE UP (ref 2.5–4.5)
PLATELET # BLD AUTO: 188 K/UL — SIGNIFICANT CHANGE UP (ref 150–400)
POTASSIUM SERPL-MCNC: 4.2 MMOL/L — SIGNIFICANT CHANGE UP (ref 3.5–5.3)
POTASSIUM SERPL-SCNC: 4.2 MMOL/L — SIGNIFICANT CHANGE UP (ref 3.5–5.3)
RBC # BLD: 3.76 M/UL — LOW (ref 3.8–5.2)
RBC # FLD: 14.9 % — HIGH (ref 10.3–14.5)
SODIUM SERPL-SCNC: 137 MMOL/L — SIGNIFICANT CHANGE UP (ref 135–145)
WBC # BLD: 12.08 K/UL — HIGH (ref 3.8–10.5)
WBC # FLD AUTO: 12.08 K/UL — HIGH (ref 3.8–10.5)

## 2017-08-13 RX ADMIN — OXYCODONE AND ACETAMINOPHEN 1 TABLET(S): 5; 325 TABLET ORAL at 12:01

## 2017-08-13 RX ADMIN — Medication 400 MILLIGRAM(S): at 00:45

## 2017-08-13 RX ADMIN — OXYCODONE AND ACETAMINOPHEN 1 TABLET(S): 5; 325 TABLET ORAL at 05:47

## 2017-08-13 RX ADMIN — OXYCODONE AND ACETAMINOPHEN 1 TABLET(S): 5; 325 TABLET ORAL at 18:54

## 2017-08-13 RX ADMIN — Medication: at 08:06

## 2017-08-13 RX ADMIN — AMLODIPINE BESYLATE 10 MILLIGRAM(S): 2.5 TABLET ORAL at 05:16

## 2017-08-13 RX ADMIN — Medication 100 MICROGRAM(S): at 05:16

## 2017-08-13 RX ADMIN — ATORVASTATIN CALCIUM 10 MILLIGRAM(S): 80 TABLET, FILM COATED ORAL at 21:18

## 2017-08-13 RX ADMIN — Medication 250 MILLIGRAM(S): at 18:12

## 2017-08-13 RX ADMIN — OXYCODONE AND ACETAMINOPHEN 1 TABLET(S): 5; 325 TABLET ORAL at 18:14

## 2017-08-13 RX ADMIN — Medication 12.5 MILLIGRAM(S): at 05:16

## 2017-08-13 RX ADMIN — ENOXAPARIN SODIUM 40 MILLIGRAM(S): 100 INJECTION SUBCUTANEOUS at 12:01

## 2017-08-13 RX ADMIN — Medication 250 MILLIGRAM(S): at 05:59

## 2017-08-13 RX ADMIN — Medication 400 MILLIGRAM(S): at 00:15

## 2017-08-13 RX ADMIN — Medication 650 MILLIGRAM(S): at 21:52

## 2017-08-13 RX ADMIN — Medication 650 MILLIGRAM(S): at 21:18

## 2017-08-13 RX ADMIN — Medication 1: at 12:01

## 2017-08-13 RX ADMIN — VALSARTAN 320 MILLIGRAM(S): 80 TABLET ORAL at 05:16

## 2017-08-13 RX ADMIN — OXYCODONE AND ACETAMINOPHEN 1 TABLET(S): 5; 325 TABLET ORAL at 13:48

## 2017-08-13 RX ADMIN — OXYCODONE AND ACETAMINOPHEN 1 TABLET(S): 5; 325 TABLET ORAL at 05:17

## 2017-08-13 RX ADMIN — CEFTRIAXONE 100 GRAM(S): 500 INJECTION, POWDER, FOR SOLUTION INTRAMUSCULAR; INTRAVENOUS at 11:06

## 2017-08-13 NOTE — PROGRESS NOTE ADULT - ASSESSMENT
PT  WITH  LEFT  SHOULDER  PAIN,  HIGH  WBC   SEEN BY  ID, ON ROCEPHIN/ VANCO    FOLLOW  BLOOD  C/S,    PAIN MEDS,  DVT  PPX,  HTN,  DM,    high  lfts,  follow  in am'    abdominal  u/s

## 2017-08-14 LAB
A PHAGOCYTOPH IGG TITR SER IF: SIGNIFICANT CHANGE UP TITER
ANION GAP SERPL CALC-SCNC: 17 MMOL/L — SIGNIFICANT CHANGE UP (ref 5–17)
B BURGDOR AB SER QL IA: NEGATIVE — SIGNIFICANT CHANGE UP
B MICROTI IGG TITR SER: SIGNIFICANT CHANGE UP TITER
BUN SERPL-MCNC: 35 MG/DL — HIGH (ref 7–23)
CALCIUM SERPL-MCNC: 9.6 MG/DL — SIGNIFICANT CHANGE UP (ref 8.4–10.5)
CHLORIDE SERPL-SCNC: 101 MMOL/L — SIGNIFICANT CHANGE UP (ref 96–108)
CO2 SERPL-SCNC: 20 MMOL/L — LOW (ref 22–31)
CREAT SERPL-MCNC: 1.25 MG/DL — SIGNIFICANT CHANGE UP (ref 0.5–1.3)
E CHAFFEENSIS IGG TITR SER IF: SIGNIFICANT CHANGE UP TITER
FERRITIN SERPL-MCNC: 329 NG/ML — HIGH (ref 15–150)
GLUCOSE SERPL-MCNC: 141 MG/DL — HIGH (ref 70–99)
HCT VFR BLD CALC: 32.9 % — LOW (ref 34.5–45)
HCT VFR BLD CALC: 36.6 % — SIGNIFICANT CHANGE UP (ref 34.5–45)
HGB BLD-MCNC: 10.9 G/DL — LOW (ref 11.5–15.5)
HGB BLD-MCNC: 12.2 G/DL — SIGNIFICANT CHANGE UP (ref 11.5–15.5)
IRON SATN MFR SERPL: 18 % — SIGNIFICANT CHANGE UP (ref 14–50)
IRON SATN MFR SERPL: 51 UG/DL — SIGNIFICANT CHANGE UP (ref 30–160)
MCHC RBC-ENTMCNC: 30.9 PG — SIGNIFICANT CHANGE UP (ref 27–34)
MCHC RBC-ENTMCNC: 32.1 PG — SIGNIFICANT CHANGE UP (ref 27–34)
MCHC RBC-ENTMCNC: 33.1 GM/DL — SIGNIFICANT CHANGE UP (ref 32–36)
MCHC RBC-ENTMCNC: 33.3 GM/DL — SIGNIFICANT CHANGE UP (ref 32–36)
MCV RBC AUTO: 93.2 FL — SIGNIFICANT CHANGE UP (ref 80–100)
MCV RBC AUTO: 96.3 FL — SIGNIFICANT CHANGE UP (ref 80–100)
PLATELET # BLD AUTO: 223 K/UL — SIGNIFICANT CHANGE UP (ref 150–400)
PLATELET # BLD AUTO: 227 K/UL — SIGNIFICANT CHANGE UP (ref 150–400)
POTASSIUM SERPL-MCNC: 4 MMOL/L — SIGNIFICANT CHANGE UP (ref 3.5–5.3)
POTASSIUM SERPL-SCNC: 4 MMOL/L — SIGNIFICANT CHANGE UP (ref 3.5–5.3)
RBC # BLD: 3.53 M/UL — LOW (ref 3.8–5.2)
RBC # BLD: 3.8 M/UL — SIGNIFICANT CHANGE UP (ref 3.8–5.2)
RBC # FLD: 12.4 % — SIGNIFICANT CHANGE UP (ref 10.3–14.5)
RBC # FLD: 14.5 % — SIGNIFICANT CHANGE UP (ref 10.3–14.5)
SODIUM SERPL-SCNC: 138 MMOL/L — SIGNIFICANT CHANGE UP (ref 135–145)
TIBC SERPL-MCNC: 279 UG/DL — SIGNIFICANT CHANGE UP (ref 220–430)
UIBC SERPL-MCNC: 228 UG/DL — SIGNIFICANT CHANGE UP (ref 110–370)
VANCOMYCIN TROUGH SERPL-MCNC: 13.3 UG/ML — SIGNIFICANT CHANGE UP (ref 10–20)
WBC # BLD: 8.37 K/UL — SIGNIFICANT CHANGE UP (ref 3.8–10.5)
WBC # BLD: 8.6 K/UL — SIGNIFICANT CHANGE UP (ref 3.8–10.5)
WBC # FLD AUTO: 8.37 K/UL — SIGNIFICANT CHANGE UP (ref 3.8–10.5)
WBC # FLD AUTO: 8.6 K/UL — SIGNIFICANT CHANGE UP (ref 3.8–10.5)

## 2017-08-14 PROCEDURE — 99232 SBSQ HOSP IP/OBS MODERATE 35: CPT

## 2017-08-14 PROCEDURE — 73221 MRI JOINT UPR EXTREM W/O DYE: CPT | Mod: 26,LT

## 2017-08-14 RX ADMIN — OXYCODONE AND ACETAMINOPHEN 1 TABLET(S): 5; 325 TABLET ORAL at 01:05

## 2017-08-14 RX ADMIN — ATORVASTATIN CALCIUM 10 MILLIGRAM(S): 80 TABLET, FILM COATED ORAL at 21:19

## 2017-08-14 RX ADMIN — Medication 650 MILLIGRAM(S): at 03:27

## 2017-08-14 RX ADMIN — Medication 650 MILLIGRAM(S): at 04:28

## 2017-08-14 RX ADMIN — AMLODIPINE BESYLATE 10 MILLIGRAM(S): 2.5 TABLET ORAL at 05:42

## 2017-08-14 RX ADMIN — Medication 250 MILLIGRAM(S): at 08:39

## 2017-08-14 RX ADMIN — Medication 100 MICROGRAM(S): at 05:42

## 2017-08-14 RX ADMIN — Medication 1: at 17:39

## 2017-08-14 RX ADMIN — HYDROMORPHONE HYDROCHLORIDE 0.5 MILLIGRAM(S): 2 INJECTION INTRAMUSCULAR; INTRAVENOUS; SUBCUTANEOUS at 22:20

## 2017-08-14 RX ADMIN — OXYCODONE AND ACETAMINOPHEN 1 TABLET(S): 5; 325 TABLET ORAL at 23:53

## 2017-08-14 RX ADMIN — HYDROMORPHONE HYDROCHLORIDE 0.5 MILLIGRAM(S): 2 INJECTION INTRAMUSCULAR; INTRAVENOUS; SUBCUTANEOUS at 21:19

## 2017-08-14 RX ADMIN — OXYCODONE AND ACETAMINOPHEN 1 TABLET(S): 5; 325 TABLET ORAL at 16:24

## 2017-08-14 RX ADMIN — VALSARTAN 320 MILLIGRAM(S): 80 TABLET ORAL at 05:42

## 2017-08-14 RX ADMIN — OXYCODONE AND ACETAMINOPHEN 1 TABLET(S): 5; 325 TABLET ORAL at 00:33

## 2017-08-14 RX ADMIN — CEFTRIAXONE 100 GRAM(S): 500 INJECTION, POWDER, FOR SOLUTION INTRAMUSCULAR; INTRAVENOUS at 13:42

## 2017-08-14 RX ADMIN — Medication 12.5 MILLIGRAM(S): at 05:43

## 2017-08-14 RX ADMIN — ENOXAPARIN SODIUM 40 MILLIGRAM(S): 100 INJECTION SUBCUTANEOUS at 13:43

## 2017-08-14 NOTE — PHYSICAL THERAPY INITIAL EVALUATION ADULT - PERTINENT HX OF CURRENT PROBLEM, REHAB EVAL
Pt is a 65 y/o female admitted to [facility] on 8/11/17 for L shoulder pain. Pt reports pain started ~1 week ago while reaching for something. Worsening over past week and more recent development of SOB. +Morphine in ED, denies CP. Pain is worse moving head/neck or LUE.

## 2017-08-14 NOTE — PHYSICAL THERAPY INITIAL EVALUATION ADULT - ADDITIONAL COMMENTS
Pt lives at home with spouse, +5 steps to enter with railing b/l, no stairs inside home, PTA amb ind with no device, ind ADLs, +L handed, spouse is home and retired, can assist as needed.

## 2017-08-14 NOTE — PHYSICAL THERAPY INITIAL EVALUATION ADULT - PLANNED THERAPY INTERVENTIONS, PT EVAL
Pt is functionally indpeendent, cleared from PT program as per PT standpoint, assist from spouse as needed, will remain on amb aide walking program

## 2017-08-14 NOTE — PHYSICAL THERAPY INITIAL EVALUATION ADULT - ACTIVE RANGE OF MOTION EXAMINATION, REHAB EVAL
BLE WFL, RUE WFL, LUE limited 2* pain, AROM L elbow flex/ext WFL, wrist WFL, shld AAROM to 50* 2* pain, PROM guarding shld, abd limited 2* pain/Right UE Active ROM was WFL (within functional limits)/Right LE Active ROM was WFL (within functional limits)/Left LE Active ROM was WFL (within functional limits)

## 2017-08-14 NOTE — PROGRESS NOTE ADULT - ASSESSMENT
PT  WITH  LEFT  SHOULDER  PAIN, aresolving ,   HIGH  WBC  R/O  SEPTIC  ARTHRITIS ,   ID  TO  SEE PT,   PAIN MEDS,  DVT  PPX,  HTN,  DM,    high  lfts,  follow  in am'    abdominal  u/s on IV  ROCEPHIN/  VANCO

## 2017-08-14 NOTE — PHYSICAL THERAPY INITIAL EVALUATION ADULT - DISCHARGE DISPOSITION, PT EVAL
DC home with outpatient PT services for LUE and pain management, assist from spouse as needed, owns rolling walker, CM to be notifed.

## 2017-08-14 NOTE — PROGRESS NOTE ADULT - ASSESSMENT
65 yo woman with a rotator cuff tear.    Please ask orthopedics to see the patient    No evidence of septic arthritis with blood and joint fluid cultures no growth.    Will discontinue antibiotics  Call if questions arise

## 2017-08-14 NOTE — PHYSICAL THERAPY INITIAL EVALUATION ADULT - PRECAUTIONS/LIMITATIONS, REHAB EVAL
CTA chest: No evidence for aortic dissection. Multiple stable subpleural and perifissural nodules, likely representing intrapulmonary lymph nodes. xray L shoulder: No fracture or dislocation visualized in the left shoulder. CTA chest: No evidence for aortic dissection. Multiple stable subpleural and perifissural nodules, likely representing intrapulmonary lymph nodes. xray L shoulder: No fracture or dislocation visualized in the left shoulder./fall precautions

## 2017-08-14 NOTE — CHART NOTE - NSCHARTNOTEFT_GEN_A_CORE
MRI shoulder results reviewed.  Patient is still orthopedic stable to follow-up for outpatient management of rotator cuff tear.

## 2017-08-15 ENCOUNTER — TRANSCRIPTION ENCOUNTER (OUTPATIENT)
Age: 67
End: 2017-08-15

## 2017-08-15 VITALS
HEART RATE: 81 BPM | SYSTOLIC BLOOD PRESSURE: 136 MMHG | TEMPERATURE: 99 F | DIASTOLIC BLOOD PRESSURE: 74 MMHG | RESPIRATION RATE: 18 BRPM | OXYGEN SATURATION: 97 %

## 2017-08-15 PROCEDURE — 84484 ASSAY OF TROPONIN QUANT: CPT

## 2017-08-15 PROCEDURE — 82550 ASSAY OF CK (CPK): CPT

## 2017-08-15 PROCEDURE — 86140 C-REACTIVE PROTEIN: CPT

## 2017-08-15 PROCEDURE — 71046 X-RAY EXAM CHEST 2 VIEWS: CPT

## 2017-08-15 PROCEDURE — 85730 THROMBOPLASTIN TIME PARTIAL: CPT

## 2017-08-15 PROCEDURE — 73221 MRI JOINT UPR EXTREM W/O DYE: CPT

## 2017-08-15 PROCEDURE — 74174 CTA ABD&PLVS W/CONTRAST: CPT

## 2017-08-15 PROCEDURE — 96374 THER/PROPH/DIAG INJ IV PUSH: CPT | Mod: XU

## 2017-08-15 PROCEDURE — 73030 X-RAY EXAM OF SHOULDER: CPT

## 2017-08-15 PROCEDURE — 85652 RBC SED RATE AUTOMATED: CPT

## 2017-08-15 PROCEDURE — 97161 PT EVAL LOW COMPLEX 20 MIN: CPT

## 2017-08-15 PROCEDURE — 86850 RBC ANTIBODY SCREEN: CPT

## 2017-08-15 PROCEDURE — 83036 HEMOGLOBIN GLYCOSYLATED A1C: CPT

## 2017-08-15 PROCEDURE — 87040 BLOOD CULTURE FOR BACTERIA: CPT

## 2017-08-15 PROCEDURE — 85610 PROTHROMBIN TIME: CPT

## 2017-08-15 PROCEDURE — 93005 ELECTROCARDIOGRAM TRACING: CPT

## 2017-08-15 PROCEDURE — 83550 IRON BINDING TEST: CPT

## 2017-08-15 PROCEDURE — 86900 BLOOD TYPING SEROLOGIC ABO: CPT

## 2017-08-15 PROCEDURE — 83735 ASSAY OF MAGNESIUM: CPT

## 2017-08-15 PROCEDURE — 87205 SMEAR GRAM STAIN: CPT

## 2017-08-15 PROCEDURE — 99285 EMERGENCY DEPT VISIT HI MDM: CPT | Mod: 25

## 2017-08-15 PROCEDURE — 89060 EXAM SYNOVIAL FLUID CRYSTALS: CPT

## 2017-08-15 PROCEDURE — 82553 CREATINE MB FRACTION: CPT

## 2017-08-15 PROCEDURE — 71275 CT ANGIOGRAPHY CHEST: CPT

## 2017-08-15 PROCEDURE — 86901 BLOOD TYPING SEROLOGIC RH(D): CPT

## 2017-08-15 PROCEDURE — 80053 COMPREHEN METABOLIC PANEL: CPT

## 2017-08-15 PROCEDURE — 80202 ASSAY OF VANCOMYCIN: CPT

## 2017-08-15 PROCEDURE — 84100 ASSAY OF PHOSPHORUS: CPT

## 2017-08-15 PROCEDURE — 87070 CULTURE OTHR SPECIMN AEROBIC: CPT

## 2017-08-15 PROCEDURE — 85027 COMPLETE CBC AUTOMATED: CPT

## 2017-08-15 PROCEDURE — 82728 ASSAY OF FERRITIN: CPT

## 2017-08-15 PROCEDURE — 80048 BASIC METABOLIC PNL TOTAL CA: CPT

## 2017-08-15 PROCEDURE — 89051 BODY FLUID CELL COUNT: CPT

## 2017-08-15 PROCEDURE — 96375 TX/PRO/DX INJ NEW DRUG ADDON: CPT | Mod: XU

## 2017-08-15 PROCEDURE — 86666 EHRLICHIA ANTIBODY: CPT

## 2017-08-15 PROCEDURE — 87075 CULTR BACTERIA EXCEPT BLOOD: CPT

## 2017-08-15 PROCEDURE — 84443 ASSAY THYROID STIM HORMONE: CPT

## 2017-08-15 RX ORDER — FLUTICASONE PROPIONATE 50 MCG
1 SPRAY, SUSPENSION NASAL
Qty: 0 | Refills: 0 | COMMUNITY

## 2017-08-15 RX ORDER — AMLODIPINE BESYLATE 2.5 MG/1
1 TABLET ORAL
Qty: 0 | Refills: 0 | COMMUNITY
Start: 2017-08-15

## 2017-08-15 RX ORDER — METFORMIN HYDROCHLORIDE 850 MG/1
0 TABLET ORAL
Qty: 0 | Refills: 0 | COMMUNITY

## 2017-08-15 RX ORDER — FLUTICASONE PROPIONATE 50 MCG
0 SPRAY, SUSPENSION NASAL
Qty: 0 | Refills: 0 | COMMUNITY

## 2017-08-15 RX ORDER — OXYCODONE HYDROCHLORIDE 5 MG/1
1 TABLET ORAL
Qty: 0 | Refills: 0 | COMMUNITY
Start: 2017-08-15

## 2017-08-15 RX ORDER — METFORMIN HYDROCHLORIDE 850 MG/1
1000 TABLET ORAL
Qty: 0 | Refills: 0 | COMMUNITY

## 2017-08-15 RX ORDER — ATORVASTATIN CALCIUM 80 MG/1
1 TABLET, FILM COATED ORAL
Qty: 0 | Refills: 0 | COMMUNITY
Start: 2017-08-15

## 2017-08-15 RX ORDER — LEVOTHYROXINE SODIUM 125 MCG
1 TABLET ORAL
Qty: 0 | Refills: 0 | COMMUNITY
Start: 2017-08-15

## 2017-08-15 RX ADMIN — Medication 12.5 MILLIGRAM(S): at 05:20

## 2017-08-15 RX ADMIN — Medication 100 MICROGRAM(S): at 05:20

## 2017-08-15 RX ADMIN — AMLODIPINE BESYLATE 10 MILLIGRAM(S): 2.5 TABLET ORAL at 05:20

## 2017-08-15 RX ADMIN — OXYCODONE AND ACETAMINOPHEN 1 TABLET(S): 5; 325 TABLET ORAL at 00:30

## 2017-08-15 RX ADMIN — HYDROMORPHONE HYDROCHLORIDE 0.5 MILLIGRAM(S): 2 INJECTION INTRAMUSCULAR; INTRAVENOUS; SUBCUTANEOUS at 05:51

## 2017-08-15 RX ADMIN — VALSARTAN 320 MILLIGRAM(S): 80 TABLET ORAL at 05:20

## 2017-08-15 RX ADMIN — HYDROMORPHONE HYDROCHLORIDE 0.5 MILLIGRAM(S): 2 INJECTION INTRAMUSCULAR; INTRAVENOUS; SUBCUTANEOUS at 05:20

## 2017-08-15 NOTE — DISCHARGE NOTE ADULT - MEDICATION SUMMARY - MEDICATIONS TO STOP TAKING
I will STOP taking the medications listed below when I get home from the hospital:    Rianna  --  by mouth

## 2017-08-15 NOTE — DISCHARGE NOTE ADULT - PROVIDER TOKENS
TOKEN:'[id number]',FREE:[LAST:[Camelia],FIRST:[Car],PHONE:[(   )    -],FAX:[(   )    -],ADDRESS:[[address] [place] [address]      Tel: [phone]]]

## 2017-08-15 NOTE — DISCHARGE NOTE ADULT - MEDICATION SUMMARY - MEDICATIONS TO TAKE
I will START or STAY ON the medications listed below when I get home from the hospital:    Percocet 5/325 oral tablet  -- 1 tab(s) by mouth every 8 hours, As Needed MDD:3  -- Indication: For Pain    metFORMIN  -- 1000 milligram(s) by mouth 2 times a day  -- Indication: For Blood sugar control    atorvastatin 10 mg oral tablet  -- 1 tab(s) by mouth once a day (at bedtime)  -- Indication: For Cholesterol control    irbesartan-hydrochlorothiazide 300mg-12.5mg oral tablet  -- 1 tab(s) by mouth once a day  -- Indication: For Blood pressure control    amLODIPine 10 mg oral tablet  -- 1 tab(s) by mouth once a day (at bedtime)  -- Indication: For Blood pressure control    Flonase 50 mcg/inh nasal spray  -- 1 spray(s) in each nostril once a day  -- Indication: For Decongesant    levothyroxine 100 mcg (0.1 mg) oral tablet  -- 1 tab(s) by mouth once a day  -- Indication: For Hypothyroid    Calcium 600+D oral tablet  -- 1 tab(s) by mouth 2 times a day  -- Indication: For supplement for osteoporosis

## 2017-08-15 NOTE — DISCHARGE NOTE ADULT - ADDITIONAL INSTRUCTIONS
Follow up with Orthopedist - Dr. Hart in 2 - 3 days - call for appointment  Follow up with PMD - Dr. Denise in 1 week - call for appointment  Follow up with Cardiologist - Dr. Barnhart in 1 week - call for appointment

## 2017-08-15 NOTE — DISCHARGE NOTE ADULT - CARE PROVIDER_API CALL
Rosalio Hart), Orthopaedic Surgery  [address] [place] [address]  [address]  [place] [address]  Phone: [phone]  Fax: [phone]    Car Denise  [address] [facility] [address]   [place] [address]      Tel: [phone]  Phone: (   )    -  Fax: (   )    -

## 2017-08-15 NOTE — CHART NOTE - NSCHARTNOTEFT_GEN_A_CORE
Request from Dr. Arango to facilitate patient discharge. Medication reconciliation reviewed, revised, and resolved with Dr. Arango who had medically cleared patient for discharge with follow-up as advised. Please refer to discharge note for detailed hospital course. Patient stable for discharge to home at this time.    Yimi Kern NP  Contact # [phone]

## 2017-08-15 NOTE — DISCHARGE NOTE ADULT - PLAN OF CARE
Pain control and improve range of motion Outpatient physical therapy  Follow up with Orthopedist - Dr. Lemos in 2 - 3 days - call for appointment Take medications for your blood pressure as recommended.  Eat a heart healthy diet that is low in saturated fats and salt, and includes whole grains, fruits, vegetables and lean protein   Exercise regularly (consult with your physician or cardiologist first); maintain a heart healthy weight.   If you smoke - quit (A resource to help you stop smoking is the [facility] – phone number 409-517-8116.). Continue to follow with your primary physician or cardiologist.   Seek medical help for dizziness, Lightheadedness, Blurry vision, Headache, Chest pain, Shortness of breath  Follow up with your medical doctor to establish long term blood pressure treatment goals. Your next appointment with endocrinologist ([facility] endocrine ph: [phone])/PMD is -   HgA1C this admission -  Make sure you get your HgA1c checked every three months.  If you take oral diabetes medications, check your blood glucose two times a day.  If you take insulin, check your blood glucose before meals and at bedtime.  It's important not to skip any meals.  Keep a log of your blood glucose results and always take it with you to your doctor appointments.  Keep a list of your current medications including injectables and over the counter medications and bring this medication list with you to all your doctor appointments.  If you have not seen your ophthalmologist this year call for appointment.  Check your feet daily for redness, sores, or openings. Do not self treat. If no improvement in two days call your primary care physician for an appointment.  Low blood sugar (hypoglycemia) is a blood sugar below 70mg/dl. Check your blood sugar if you feel signs/symptoms of hypoglycemia. If your blood sugar is below 70 take 15 grams of carbohydrates (ex 4 oz of apple juice, 3-4 glucose tablets, or 4-6 oz of regular soda) wait 15 minutes and repeat blood sugar to make sure it comes up above 70.  If your blood sugar is above 70 and you are due for a meal, have a meal.  If you are not due for a meal have a snack.  This snack helps keeps your blood sugar at a safe range. you do not make enough thyroid hormone  signs & symptoms of low levels of thyroid hormone - tired, getting cold easily, coarse or thin hair, constipation, shortness of breath, swelling, irregular periods  your doctor will do thyroid hormone blood tests at least once a year to monitor if medication dose is adequate  take your thyroid medicine as directed by your doctor & on empty stomach Continue with your cholesterol medications. Eat a heart healthy diet that is low in saturated fats and salt, and includes whole grains, fruits, vegetables and lean protein; exercise regularly (consult with your physician or cardiologist first); maintain a heart healthy weight; if you smoke - quit (A resource to help you stop smoking is the [facility] – phone number [phone].). Continue to follow with your primary physician or cardiologist.  Seek medical help for dizziness, Lightheadedness, Blurry vision, Headache, Chest pain, Shortness of breath

## 2017-08-15 NOTE — PROGRESS NOTE ADULT - ASSESSMENT
PT  WITH  LEFT  SHOULDER  PAIN,  HIGH  WBC     SEPTIC  ARTHRITIS   RULED  OUT,    ORTHO   AND  ID   SAW  PT,   PAIN MEDS,  DVT  PPX,  HTN,  DM,    high  lfts,  follow  in am'    abdominal  u/s    MRI  WITH  ROTATOR  CUFF TEAR NEEDS OP  F/P  PER  ORTHO PT  WITH  LEFT  SHOULDER  PAIN,  HIGH  WBC     SEPTIC  ARTHRITIS   RULED  OUT,    ORTHO   AND  ID   SAW  PT,   PAIN MEDS,  DVT  PPX,  HTN,  DM,    high  lfts,  follow  in am'        MRI  WITH  ROTATOR  CUFF TEAR NEEDS OP  F/P  PER  ORTHO

## 2017-08-15 NOTE — DISCHARGE NOTE ADULT - SECONDARY DIAGNOSIS.
Essential hypertension Type 2 diabetes mellitus Hypothyroidism, unspecified type Hyperlipidemia, unspecified hyperlipidemia type

## 2017-08-15 NOTE — DISCHARGE NOTE ADULT - MEDICATION SUMMARY - MEDICATIONS TO CHANGE
I will SWITCH the dose or number of times a day I take the medications listed below when I get home from the hospital:    metFORMIN  --  by mouth    Flonase  --  into nose

## 2017-08-15 NOTE — DISCHARGE NOTE ADULT - HOSPITAL COURSE
Pt adm with left shoulder pain.  Started on abx per ID but tap negative for septic joint.  Followed by ortho, MRI showed roatator cuff tear.  Will f/u ortho out pt.

## 2017-08-15 NOTE — DISCHARGE NOTE ADULT - CARE PLAN
Principal Discharge DX:	Tear of left rotator cuff, unspecified tear extent  Goal:	Pain control and improve range of motion  Instructions for follow-up, activity and diet:	Outpatient physical therapy  Follow up with Orthopedist - Dr. Lemos in 2 - 3 days - call for appointment  Secondary Diagnosis:	Essential hypertension  Instructions for follow-up, activity and diet:	Take medications for your blood pressure as recommended.  Eat a heart healthy diet that is low in saturated fats and salt, and includes whole grains, fruits, vegetables and lean protein   Exercise regularly (consult with your physician or cardiologist first); maintain a heart healthy weight.   If you smoke - quit (A resource to help you stop smoking is the [facility] – phone number [phone].). Continue to follow with your primary physician or cardiologist.   Seek medical help for dizziness, Lightheadedness, Blurry vision, Headache, Chest pain, Shortness of breath  Follow up with your medical doctor to establish long term blood pressure treatment goals.  Secondary Diagnosis:	Type 2 diabetes mellitus  Instructions for follow-up, activity and diet:	Your next appointment with endocrinologist ([facility] endocrine ph: [phone])/PMD is -   HgA1C this admission -  Make sure you get your HgA1c checked every three months.  If you take oral diabetes medications, check your blood glucose two times a day.  If you take insulin, check your blood glucose before meals and at bedtime.  It's important not to skip any meals.  Keep a log of your blood glucose results and always take it with you to your doctor appointments.  Keep a list of your current medications including injectables and over the counter medications and bring this medication list with you to all your doctor appointments.  If you have not seen your ophthalmologist this year call for appointment.  Check your feet daily for redness, sores, or openings. Do not self treat. If no improvement in two days call your primary care physician for an appointment.  Low blood sugar (hypoglycemia) is a blood sugar below 70mg/dl. Check your blood sugar if you feel signs/symptoms of hypoglycemia. If your blood sugar is below 70 take 15 grams of carbohydrates (ex 4 oz of apple juice, 3-4 glucose tablets, or 4-6 oz of regular soda) wait 15 minutes and repeat blood sugar to make sure it comes up above 70.  If your blood sugar is above 70 and you are due for a meal, have a meal.  If you are not due for a meal have a snack.  This snack helps keeps your blood sugar at a safe range.  Secondary Diagnosis:	Hypothyroidism, unspecified type  Instructions for follow-up, activity and diet:	you do not make enough thyroid hormone  signs & symptoms of low levels of thyroid hormone - tired, getting cold easily, coarse or thin hair, constipation, shortness of breath, swelling, irregular periods  your doctor will do thyroid hormone blood tests at least once a year to monitor if medication dose is adequate  take your thyroid medicine as directed by your doctor & on empty stomach  Secondary Diagnosis:	Hyperlipidemia, unspecified hyperlipidemia type  Instructions for follow-up, activity and diet:	Continue with your cholesterol medications. Eat a heart healthy diet that is low in saturated fats and salt, and includes whole grains, fruits, vegetables and lean protein; exercise regularly (consult with your physician or cardiologist first); maintain a heart healthy weight; if you smoke - quit (A resource to help you stop smoking is the [facility] – phone number [phone].). Continue to follow with your primary physician or cardiologist.  Seek medical help for dizziness, Lightheadedness, Blurry vision, Headache, Chest pain, Shortness of breath Principal Discharge DX:	Tear of left rotator cuff, unspecified tear extent  Goal:	Pain control and improve range of motion  Instructions for follow-up, activity and diet:	Outpatient physical therapy  Follow up with Orthopedist - Dr. Lemos in 2 - 3 days - call for appointment  Secondary Diagnosis:	Essential hypertension  Instructions for follow-up, activity and diet:	Take medications for your blood pressure as recommended.  Eat a heart healthy diet that is low in saturated fats and salt, and includes whole grains, fruits, vegetables and lean protein   Exercise regularly (consult with your physician or cardiologist first); maintain a heart healthy weight.   If you smoke - quit (A resource to help you stop smoking is the [facility] – phone number [phone].). Continue to follow with your primary physician or cardiologist.   Seek medical help for dizziness, Lightheadedness, Blurry vision, Headache, Chest pain, Shortness of breath  Follow up with your medical doctor to establish long term blood pressure treatment goals.  Secondary Diagnosis:	Type 2 diabetes mellitus  Instructions for follow-up, activity and diet:	Your next appointment with endocrinologist ([facility] endocrine ph: [phone])/PMD is -   HgA1C this admission -  Make sure you get your HgA1c checked every three months.  If you take oral diabetes medications, check your blood glucose two times a day.  If you take insulin, check your blood glucose before meals and at bedtime.  It's important not to skip any meals.  Keep a log of your blood glucose results and always take it with you to your doctor appointments.  Keep a list of your current medications including injectables and over the counter medications and bring this medication list with you to all your doctor appointments.  If you have not seen your ophthalmologist this year call for appointment.  Check your feet daily for redness, sores, or openings. Do not self treat. If no improvement in two days call your primary care physician for an appointment.  Low blood sugar (hypoglycemia) is a blood sugar below 70mg/dl. Check your blood sugar if you feel signs/symptoms of hypoglycemia. If your blood sugar is below 70 take 15 grams of carbohydrates (ex 4 oz of apple juice, 3-4 glucose tablets, or 4-6 oz of regular soda) wait 15 minutes and repeat blood sugar to make sure it comes up above 70.  If your blood sugar is above 70 and you are due for a meal, have a meal.  If you are not due for a meal have a snack.  This snack helps keeps your blood sugar at a safe range.  Secondary Diagnosis:	Hypothyroidism, unspecified type  Instructions for follow-up, activity and diet:	you do not make enough thyroid hormone  signs & symptoms of low levels of thyroid hormone - tired, getting cold easily, coarse or thin hair, constipation, shortness of breath, swelling, irregular periods  your doctor will do thyroid hormone blood tests at least once a year to monitor if medication dose is adequate  take your thyroid medicine as directed by your doctor & on empty stomach  Secondary Diagnosis:	Hyperlipidemia, unspecified hyperlipidemia type  Instructions for follow-up, activity and diet:	Continue with your cholesterol medications. Eat a heart healthy diet that is low in saturated fats and salt, and includes whole grains, fruits, vegetables and lean protein; exercise regularly (consult with your physician or cardiologist first); maintain a heart healthy weight; if you smoke - quit (A resource to help you stop smoking is the [facility] – phone number [phone].). Continue to follow with your primary physician or cardiologist.  Seek medical help for dizziness, Lightheadedness, Blurry vision, Headache, Chest pain, Shortness of breath

## 2017-08-15 NOTE — PROGRESS NOTE ADULT - SUBJECTIVE AND OBJECTIVE BOX
- Patient seen and examined.  - In summary, patient is a 66y year old woman who presented with L shoulder pain (11 Aug 2017 10:15)  - Today, patient is without complaints.         *****MEDICATIONS:    MEDICATIONS  (STANDING):  valsartan 320 milliGRAM(s) Oral daily  levothyroxine 100 MICROGram(s) Oral daily  atorvastatin 10 milliGRAM(s) Oral at bedtime  amLODIPine   Tablet 10 milliGRAM(s) Oral daily  hydrochlorothiazide 12.5 milliGRAM(s) Oral daily  insulin lispro (HumaLOG) corrective regimen sliding scale   SubCutaneous three times a day before meals  dextrose 5%. 1000 milliLiter(s) (50 mL/Hr) IV Continuous <Continuous>  dextrose 50% Injectable 12.5 Gram(s) IV Push once  dextrose 50% Injectable 25 Gram(s) IV Push once  dextrose 50% Injectable 25 Gram(s) IV Push once  insulin lispro (HumaLOG) corrective regimen sliding scale   SubCutaneous at bedtime  dextrose 5%. 1000 milliLiter(s) (50 mL/Hr) IV Continuous <Continuous>  dextrose 50% Injectable 12.5 Gram(s) IV Push once  dextrose 50% Injectable 25 Gram(s) IV Push once  dextrose 50% Injectable 25 Gram(s) IV Push once  cefTRIAXone   IVPB 1 Gram(s) IV Intermittent every 24 hours  enoxaparin Injectable 40 milliGRAM(s) SubCutaneous daily    MEDICATIONS  (PRN):  HYDROmorphone  Injectable 0.5 milliGRAM(s) IV Push three times a day PRN Severe Pain (7 - 10)  oxyCODONE    5 mG/acetaminophen 325 mG 1 Tablet(s) Oral every 6 hours PRN Moderate Pain (4 - 6)  dextrose Gel 1 Dose(s) Oral once PRN Blood Glucose LESS THAN 70 milliGRAM(s)/deciliter  glucagon  Injectable 1 milliGRAM(s) IntraMuscular once PRN Glucose LESS THAN 70 milligrams/deciliter  dextrose Gel 1 Dose(s) Oral once PRN Blood Glucose LESS THAN 70 milliGRAM(s)/deciliter  glucagon  Injectable 1 milliGRAM(s) IntraMuscular once PRN Glucose LESS THAN 70 milligrams/deciliter  acetaminophen   Tablet. 650 milliGRAM(s) Oral every 6 hours PRN Moderate Pain (4 - 6)  ibuprofen  Tablet 400 milliGRAM(s) Oral every 8 hours PRN pain           ***** REVIEW OF SYSTEM:  GEN: no fever, no chills, no pain  RESP: no SOB, no cough, no sputum  CVS: no chest pain, no palpitations, no edema  GI: no abdominal pain, no nausea, no vomiting, no constipation, no diarrhea  : no dysurea, no frequency  NEURO: no headache, no diziness  PSYCH: no depression, not anxious  Derm : no itching, no rash         ***** VITAL SIGNS:  T(F): 99.5 (17 @ 03:54), Max: 99.5 (17 @ 03:54)  HR: 88 (17 @ 03:54) (80 - 91)  BP: 142/83 (17 @ 03:54) (111/71 - 142/83)  RR: 18 (17 @ 03:54) (18 - 20)  SpO2: 95% (17 @ 03:54) (94% - 100%)  Wt(kg): --  ,   I&O's Summary           *****PHYSICAL EXAM:  GEN: A&O X 3 , NAD , comfortable  HEENT: NCAT, EOMI, MMM, no icterus  NECK: Supple, No JVD  CVS: S1S2 , regular , No M/R/G appreciated  PULM: CTA B/L,  no W/R/R appreciated  ABD.: soft. non tender, non distended,  bowel sounds present  Extrem: intact pulses , no edema noted  Derm: No rash or ecchymosis noted  PSYCH: normal mood, no depression, not anxious         *****LAB AND IMAGIN.7   14.0  )-----------( 178      ( 11 Aug 2017 09:35 )             40.5                   134<L>  |  95<L>  |  28<H>  ----------------------------<  262<H>  4.7   |  20<L>  |  1.07    Ca    9.4      11 Aug 2017 09:35    TPro  7.5  /  Alb  4.1  /  TBili  0.7  /  DBili  x   /  AST  96<H>  /  ALT  92<H>  /  AlkPhos  120      PT/INR - ( 11 Aug 2017 09:35 )   PT: 11.8 sec;   INR: 1.08 ratio         PTT - ( 11 Aug 2017 09:35 )  PTT:29.6 sec       CARDIAC MARKERS ( 12 Aug 2017 06:27 )  x     / <0.01 ng/mL / 57 U/L / x     / x      CARDIAC MARKERS ( 11 Aug 2017 23:36 )  x     / <0.01 ng/mL / 63 U/L / x     / 1.4 ng/mL  CARDIAC MARKERS ( 11 Aug 2017 09:35 )  x     / <0.01 ng/mL / x     / x     / 2.4 ng/mL                [All pertinent recent Imaging/Reports reviewed]         *****A S S E S S M E N T   A N D   P L A N :    66F with left shoulder pain, likely musculoskeletal  doubt angina  MCKENZIE negative  CTA no dissection  pain control  clarify and continue home meds  f/u ortho  doubt septic shoulder, tap negative    __________________________  DANY Arango D.O.
- Patient seen and examined.  - In summary, patient is a 66y year old woman who presented with L shoulder pain (11 Aug 2017 10:15)  - Today, patient is without complaints.         *****MEDICATIONS:    MEDICATIONS  (STANDING):  valsartan 320 milliGRAM(s) Oral daily  levothyroxine 100 MICROGram(s) Oral daily  atorvastatin 10 milliGRAM(s) Oral at bedtime  amLODIPine   Tablet 10 milliGRAM(s) Oral daily  hydrochlorothiazide 12.5 milliGRAM(s) Oral daily  insulin lispro (HumaLOG) corrective regimen sliding scale   SubCutaneous three times a day before meals  dextrose 5%. 1000 milliLiter(s) (50 mL/Hr) IV Continuous <Continuous>  dextrose 50% Injectable 12.5 Gram(s) IV Push once  dextrose 50% Injectable 25 Gram(s) IV Push once  dextrose 50% Injectable 25 Gram(s) IV Push once  insulin lispro (HumaLOG) corrective regimen sliding scale   SubCutaneous at bedtime  dextrose 5%. 1000 milliLiter(s) (50 mL/Hr) IV Continuous <Continuous>  dextrose 50% Injectable 12.5 Gram(s) IV Push once  dextrose 50% Injectable 25 Gram(s) IV Push once  dextrose 50% Injectable 25 Gram(s) IV Push once  enoxaparin Injectable 40 milliGRAM(s) SubCutaneous daily    MEDICATIONS  (PRN):  HYDROmorphone  Injectable 0.5 milliGRAM(s) IV Push three times a day PRN Severe Pain (7 - 10)  oxyCODONE    5 mG/acetaminophen 325 mG 1 Tablet(s) Oral every 6 hours PRN Moderate Pain (4 - 6)  dextrose Gel 1 Dose(s) Oral once PRN Blood Glucose LESS THAN 70 milliGRAM(s)/deciliter  glucagon  Injectable 1 milliGRAM(s) IntraMuscular once PRN Glucose LESS THAN 70 milligrams/deciliter  dextrose Gel 1 Dose(s) Oral once PRN Blood Glucose LESS THAN 70 milliGRAM(s)/deciliter  glucagon  Injectable 1 milliGRAM(s) IntraMuscular once PRN Glucose LESS THAN 70 milligrams/deciliter  acetaminophen   Tablet. 650 milliGRAM(s) Oral every 6 hours PRN Moderate Pain (4 - 6)             ***** REVIEW OF SYSTEM:  GEN: no fever, no chills, no pain  RESP: no SOB, no cough, no sputum  CVS: no chest pain, no palpitations, no edema  GI: no abdominal pain, no nausea, no vomiting, no constipation, no diarrhea  : no dysurea, no frequency  NEURO: no headache, no diziness  PSYCH: no depression, not anxious  Derm : no itching, no rash         ***** VITAL SIGNS:    T(F): 98.3 (08-15-17 @ 06:29), Max: 98.3 (17 @ 21:09)  HR: 77 (08-15-17 @ 06:29) (70 - 82)  BP: 134/71 (08-15-17 @ 06:29) (130/70 - 140/82)  RR: 18 (08-15-17 @ 06:29) (18 - 18)  SpO2: 97% (08-15-17 @ 06:29) (96% - 98%)  Wt(kg): --  ,   I&O's Summary    14 Aug 2017 07:01  -  15 Aug 2017 07:00  --------------------------------------------------------  IN: 780 mL / OUT: 0 mL / NET: 780 mL                   *****PHYSICAL EXAM:  GEN: A&O X 3 , NAD , comfortable  HEENT: NCAT, EOMI, MMM, no icterus  NECK: Supple, No JVD  CVS: S1S2 , regular , No M/R/G appreciated  PULM: CTA B/L,  no W/R/R appreciated  ABD.: soft. non tender, non distended,  bowel sounds present  Extrem: intact pulses , no edema noted  Derm: No rash or ecchymosis noted  PSYCH: normal mood, no depression, not anxious         *****LAB AND IMAGIN.2   8.6   )-----------( 227      ( 14 Aug 2017 11:29 )             36.6               -    138  |  101  |  35<H>  ----------------------------<  141<H>  4.0   |  20<L>  |  1.25    Ca    9.6      14 Aug 2017 08:23        [All pertinent recent Imaging/Reports reviewed]         *****A S S E S S M E N T   A N D   P L A N :    66F with left shoulder pain, likely musculoskeletal  doubt angina, MCKENZIE negative  CTA no dissection  pain improved greatly  MRI rotator cuff tear  ortho for out pt f/u  ID DC abx  doubt septic shoulder, tap negative  PT  DCP home today    __________________________  DANY Arango D.O.
- Patient seen and examined.  - In summary, patient is a 66y year old woman who presented with L shoulder pain (11 Aug 2017 10:15)  - Today, patient is without complaints.         *****MEDICATIONS:    MEDICATIONS  (STANDING):  valsartan 320 milliGRAM(s) Oral daily  levothyroxine 100 MICROGram(s) Oral daily  atorvastatin 10 milliGRAM(s) Oral at bedtime  amLODIPine   Tablet 10 milliGRAM(s) Oral daily  hydrochlorothiazide 12.5 milliGRAM(s) Oral daily  insulin lispro (HumaLOG) corrective regimen sliding scale   SubCutaneous three times a day before meals  dextrose 5%. 1000 milliLiter(s) (50 mL/Hr) IV Continuous <Continuous>  dextrose 50% Injectable 12.5 Gram(s) IV Push once  dextrose 50% Injectable 25 Gram(s) IV Push once  dextrose 50% Injectable 25 Gram(s) IV Push once  insulin lispro (HumaLOG) corrective regimen sliding scale   SubCutaneous at bedtime  dextrose 5%. 1000 milliLiter(s) (50 mL/Hr) IV Continuous <Continuous>  dextrose 50% Injectable 12.5 Gram(s) IV Push once  dextrose 50% Injectable 25 Gram(s) IV Push once  dextrose 50% Injectable 25 Gram(s) IV Push once  enoxaparin Injectable 40 milliGRAM(s) SubCutaneous daily  cefTRIAXone   IVPB 2 Gram(s) IV Intermittent every 24 hours  vancomycin  IVPB 1000 milliGRAM(s) IV Intermittent every 12 hours    MEDICATIONS  (PRN):  HYDROmorphone  Injectable 0.5 milliGRAM(s) IV Push three times a day PRN Severe Pain (7 - 10)  oxyCODONE    5 mG/acetaminophen 325 mG 1 Tablet(s) Oral every 6 hours PRN Moderate Pain (4 - 6)  dextrose Gel 1 Dose(s) Oral once PRN Blood Glucose LESS THAN 70 milliGRAM(s)/deciliter  glucagon  Injectable 1 milliGRAM(s) IntraMuscular once PRN Glucose LESS THAN 70 milligrams/deciliter  dextrose Gel 1 Dose(s) Oral once PRN Blood Glucose LESS THAN 70 milliGRAM(s)/deciliter  glucagon  Injectable 1 milliGRAM(s) IntraMuscular once PRN Glucose LESS THAN 70 milligrams/deciliter  acetaminophen   Tablet. 650 milliGRAM(s) Oral every 6 hours PRN Moderate Pain (4 - 6)               ***** REVIEW OF SYSTEM:  GEN: no fever, no chills, no pain  RESP: no SOB, no cough, no sputum  CVS: no chest pain, no palpitations, no edema  GI: no abdominal pain, no nausea, no vomiting, no constipation, no diarrhea  : no dysurea, no frequency  NEURO: no headache, no diziness  PSYCH: no depression, not anxious  Derm : no itching, no rash         ***** VITAL SIGNS:    T(F): 98.6 (08-14-17 @ 04:28), Max: 98.6 (08-14-17 @ 04:28)  HR: 69 (08-14-17 @ 04:28) (69 - 77)  BP: 124/74 (08-14-17 @ 04:28) (111/71 - 144/84)  RR: 17 (08-14-17 @ 04:28) (17 - 18)  SpO2: 97% (08-14-17 @ 04:28) (94% - 97%)  Wt(kg): --  ,   I&O's Summary    13 Aug 2017 07:01  -  14 Aug 2017 07:00  --------------------------------------------------------  IN: 1535 mL / OUT: 0 mL / NET: 1535 mL                             *****PHYSICAL EXAM:  GEN: A&O X 3 , NAD , comfortable  HEENT: NCAT, EOMI, MMM, no icterus  NECK: Supple, No JVD  CVS: S1S2 , regular , No M/R/G appreciated  PULM: CTA B/L,  no W/R/R appreciated  ABD.: soft. non tender, non distended,  bowel sounds present  Extrem: intact pulses , no edema noted  Derm: No rash or ecchymosis noted  PSYCH: normal mood, no depression, not anxious         *****LAB AND IMAGING:                          10.9   8.37  )-----------( 223      ( 14 Aug 2017 08:39 )             32.9               08-13    137  |  101  |  33<H>  ----------------------------<  122<H>  4.2   |  19<L>  |  1.29    Ca    9.7      13 Aug 2017 08:36  Phos  4.1     08-13  Mg     2.1     08-13       [All pertinent recent Imaging/Reports reviewed]         *****A S S E S S M E N T   A N D   P L A N :    66F with left shoulder pain, likely musculoskeletal  doubt angina, MCKENZIE negative  CTA no dissection  pain improving  MRI pending  f/u ortho, ID after MRI  doubt septic shoulder, tap negative  PT  DCP    __________________________  A. ART Arango.
- Patient seen and examined.  - In summary, patient is a 66y year old woman who presented with L shoulder pain (11 Aug 2017 10:15)  - Today, patient is without complaints.         *****MEDICATIONS:    MEDICATIONS  (STANDING):  valsartan 320 milliGRAM(s) Oral daily  levothyroxine 100 MICROGram(s) Oral daily  atorvastatin 10 milliGRAM(s) Oral at bedtime  amLODIPine   Tablet 10 milliGRAM(s) Oral daily  hydrochlorothiazide 12.5 milliGRAM(s) Oral daily  insulin lispro (HumaLOG) corrective regimen sliding scale   SubCutaneous three times a day before meals  dextrose 5%. 1000 milliLiter(s) (50 mL/Hr) IV Continuous <Continuous>  dextrose 50% Injectable 12.5 Gram(s) IV Push once  dextrose 50% Injectable 25 Gram(s) IV Push once  dextrose 50% Injectable 25 Gram(s) IV Push once  insulin lispro (HumaLOG) corrective regimen sliding scale   SubCutaneous at bedtime  dextrose 5%. 1000 milliLiter(s) (50 mL/Hr) IV Continuous <Continuous>  dextrose 50% Injectable 12.5 Gram(s) IV Push once  dextrose 50% Injectable 25 Gram(s) IV Push once  dextrose 50% Injectable 25 Gram(s) IV Push once  enoxaparin Injectable 40 milliGRAM(s) SubCutaneous daily  cefTRIAXone   IVPB 2 Gram(s) IV Intermittent every 24 hours  vancomycin  IVPB 1000 milliGRAM(s) IV Intermittent every 12 hours    MEDICATIONS  (PRN):  HYDROmorphone  Injectable 0.5 milliGRAM(s) IV Push three times a day PRN Severe Pain (7 - 10)  oxyCODONE    5 mG/acetaminophen 325 mG 1 Tablet(s) Oral every 6 hours PRN Moderate Pain (4 - 6)  dextrose Gel 1 Dose(s) Oral once PRN Blood Glucose LESS THAN 70 milliGRAM(s)/deciliter  glucagon  Injectable 1 milliGRAM(s) IntraMuscular once PRN Glucose LESS THAN 70 milligrams/deciliter  dextrose Gel 1 Dose(s) Oral once PRN Blood Glucose LESS THAN 70 milliGRAM(s)/deciliter  glucagon  Injectable 1 milliGRAM(s) IntraMuscular once PRN Glucose LESS THAN 70 milligrams/deciliter  acetaminophen   Tablet. 650 milliGRAM(s) Oral every 6 hours PRN Moderate Pain (4 - 6)             ***** REVIEW OF SYSTEM:  GEN: no fever, no chills, no pain  RESP: no SOB, no cough, no sputum  CVS: no chest pain, no palpitations, no edema  GI: no abdominal pain, no nausea, no vomiting, no constipation, no diarrhea  : no dysurea, no frequency  NEURO: no headache, no diziness  PSYCH: no depression, not anxious  Derm : no itching, no rash         ***** VITAL SIGNS:    T(F): 98.4 (17 @ 03:57), Max: 98.4 (17 @ 03:57)  HR: 82 (17 @ 03:57) (80 - 86)  BP: 121/72 (17 @ 03:57) (113/68 - 134/74)  RR: 18 (17 @ 03:57) (18 - 18)  SpO2: 94% (17 @ 03:57) (93% - 95%)  Wt(kg): --  ,   I&O's Summary    12 Aug 2017 07:01  -  13 Aug 2017 07:00  --------------------------------------------------------  IN: 1065 mL / OUT: 0 mL / NET: 1065 mL                 *****PHYSICAL EXAM:  GEN: A&O X 3 , NAD , comfortable  HEENT: NCAT, EOMI, MMM, no icterus  NECK: Supple, No JVD  CVS: S1S2 , regular , No M/R/G appreciated  PULM: CTA B/L,  no W/R/R appreciated  ABD.: soft. non tender, non distended,  bowel sounds present  Extrem: intact pulses , no edema noted  Derm: No rash or ecchymosis noted  PSYCH: normal mood, no depression, not anxious         *****LAB AND IMAGIN.4   13.6  )-----------( 160      ( 12 Aug 2017 10:17 )             36.6               08-12    135  |  98  |  25<H>  ----------------------------<  288<H>  4.0   |  24  |  1.00    Ca    9.4      12 Aug 2017 10:17    TPro  7.5  /  Alb  4.1  /  TBili  0.7  /  DBili  x   /  AST  96<H>  /  ALT  92<H>  /  AlkPhos  120  08-11    PT/INR - ( 11 Aug 2017 09:35 )   PT: 11.8 sec;   INR: 1.08 ratio         PTT - ( 11 Aug 2017 09:35 )  PTT:29.6 sec       CARDIAC MARKERS ( 12 Aug 2017 06:27 )  x     / <0.01 ng/mL / 57 U/L / x     / x      CARDIAC MARKERS ( 11 Aug 2017 23:36 )  x     / <0.01 ng/mL / 63 U/L / x     / 1.4 ng/mL  CARDIAC MARKERS ( 11 Aug 2017 09:35 )  x     / <0.01 ng/mL / x     / x     / 2.4 ng/mL                              [All pertinent recent Imaging/Reports reviewed]         *****A S S E S S M E N T   A N D   P L A N :    66F with left shoulder pain, likely musculoskeletal  doubt angina  MCKENZIE negative  CTA no dissection  pain control, pain improving  f/u ortho, ID  doubt septic shoulder, tap negative  MRI  PT  __________________________  DANY Arango D.O.
INFECTIOUS DISEASES FOLLOW UP-- Altagracia Franco  [phone]    This is a follow up note for this  66yFemale with pain in her left arm/shoulder. ID was asked to see her to r/o a septic arthritis      ROS:  CONSTITUTIONAL:  No fever, good appetite  CARDIOVASCULAR:  No chest pain or palpitations  RESPIRATORY:  No dyspnea  GASTROINTESTINAL:  No nausea, vomiting, diarrhea, or abdominal pain  GENITOURINARY:  No dysuria  NEUROLOGIC:  No headache,     Allergies    No Known Allergies    Intolerances        ANTIBIOTICS/RELEVANT:  antimicrobials  cefTRIAXone   IVPB 2 Gram(s) IV Intermittent every 24 hours  vancomycin  IVPB 1000 milliGRAM(s) IV Intermittent every 12 hours    immunologic:    OTHER:  valsartan 320 milliGRAM(s) Oral daily  levothyroxine 100 MICROGram(s) Oral daily  atorvastatin 10 milliGRAM(s) Oral at bedtime  amLODIPine   Tablet 10 milliGRAM(s) Oral daily  hydrochlorothiazide 12.5 milliGRAM(s) Oral daily  HYDROmorphone  Injectable 0.5 milliGRAM(s) IV Push three times a day PRN  oxyCODONE    5 mG/acetaminophen 325 mG 1 Tablet(s) Oral every 6 hours PRN  insulin lispro (HumaLOG) corrective regimen sliding scale   SubCutaneous three times a day before meals  dextrose 5%. 1000 milliLiter(s) IV Continuous <Continuous>  dextrose Gel 1 Dose(s) Oral once PRN  dextrose 50% Injectable 12.5 Gram(s) IV Push once  dextrose 50% Injectable 25 Gram(s) IV Push once  dextrose 50% Injectable 25 Gram(s) IV Push once  glucagon  Injectable 1 milliGRAM(s) IntraMuscular once PRN  insulin lispro (HumaLOG) corrective regimen sliding scale   SubCutaneous at bedtime  dextrose 5%. 1000 milliLiter(s) IV Continuous <Continuous>  dextrose Gel 1 Dose(s) Oral once PRN  dextrose 50% Injectable 12.5 Gram(s) IV Push once  dextrose 50% Injectable 25 Gram(s) IV Push once  dextrose 50% Injectable 25 Gram(s) IV Push once  glucagon  Injectable 1 milliGRAM(s) IntraMuscular once PRN  acetaminophen   Tablet. 650 milliGRAM(s) Oral every 6 hours PRN  enoxaparin Injectable 40 milliGRAM(s) SubCutaneous daily      Objective:  Vital Signs Last 24 Hrs  T(C): 37 (14 Aug 2017 04:28), Max: 37 (14 Aug 2017 04:28)  T(F): 98.6 (14 Aug 2017 04:28), Max: 98.6 (14 Aug 2017 04:28)  HR: 69 (14 Aug 2017 04:28) (69 - 77)  BP: 124/74 (14 Aug 2017 04:28) (111/71 - 124/74)  BP(mean): --  RR: 17 (14 Aug 2017 04:28) (17 - 18)  SpO2: 97% (14 Aug 2017 04:28) (94% - 97%)    PHYSICAL EXAM:  Constitutional:no acute distress  Eyes:JAVIER, EOMI  Ear/Nose/Throat: no oral lesions, 	  Respiratory: clear BL  Cardiovascular: S1S2  Gastrointestinal:soft, (+) BS, no tenderness  Extremities:no e/e/c  No Lymphadenopathy  IV sites not inflammed.    LABS:                        12.2   8.6   )-----------( 227      ( 14 Aug 2017 11:29 )             36.6     08-14    138  |  101  |  35<H>  ----------------------------<  141<H>  4.0   |  20<L>  |  1.25    Ca    9.6      14 Aug 2017 08:23  Phos  4.1     08-13  Mg     2.1     08-13            MICROBIOLOGY:    Culture - Blood (08.13.17 @ 06:37)    Specimen Source: .Blood Blood-Peripheral    Culture Results:   No growth to date.            Culture - Body Fluid with Gram Stain (08.11.17 @ 18:29)    Gram Stain:   Numerous polymorphonuclear leukocytes per low power field  No organisms seen per oil power field    Specimen Source: .Body Fluid Synovial Fluid    Culture Results:   No growth to date.    RECENT CULTURES:      RADIOLOGY & ADDITIONAL STUDIES:    < from: MRI Shoulder Joint w/o Cont, Left (08.14.17 @ 13:11) >    IMPRESSION:  1.  Massive rotator cuff tear with full-thickness, near full width tears   of both the supraspinatus and infraspinatustendons. This is associated   with fatty infiltration of the supraspinatus and infraspinatus muscle   bellies.  2.  Intra-articular portion of the long head of biceps tendon is not   visualized, and is likely torn.  3.  Osteoarthritic change of the glenohumeral and acromioclavicular   joints.      < end of copied text >
SUBJECTIVE / OVERNIGHT EVENTS: No nausea, vomiting or diarrhea, no fever or chills, no dizziness or chest pain, no dysuria or hematuria .      CAPILLARY BLOOD GLUCOSE  140 (14 Aug 2017 07:40)  197 (13 Aug 2017 21:16)  132 (13 Aug 2017 16:35)  186 (13 Aug 2017 11:47)        I&O's Summary    13 Aug 2017 07:01  -  14 Aug 2017 07:00  --------------------------------------------------------  IN: 1535 mL / OUT: 0 mL / NET: 1535 mL        PHYSICAL EXAM:  HEAD:  Atraumatic, Normocephalic  EYES: EOMI, PERRLA, conjunctiva and sclera clear  NECK: Supple, No JVD  CHEST/LUNG: Clear to auscultation bilaterally; No wheeze  HEART: Regular rate and rhythm; No murmurs, rubs, or gallops  ABDOMEN: Soft, Nontender, Nondistended; Bowel sounds present  EXTREMITIES:  2+ Peripheral Pulses, No clubbing, cyanosis, or edema  PSYCH: AAOx3  NEUROLOGY: non-focal  SKIN: No rashes or lesions    MEDICATIONS  (STANDING):  valsartan 320 milliGRAM(s) Oral daily  levothyroxine 100 MICROGram(s) Oral daily  atorvastatin 10 milliGRAM(s) Oral at bedtime  amLODIPine   Tablet 10 milliGRAM(s) Oral daily  hydrochlorothiazide 12.5 milliGRAM(s) Oral daily  insulin lispro (HumaLOG) corrective regimen sliding scale   SubCutaneous three times a day before meals  dextrose 5%. 1000 milliLiter(s) (50 mL/Hr) IV Continuous <Continuous>  dextrose 50% Injectable 12.5 Gram(s) IV Push once  dextrose 50% Injectable 25 Gram(s) IV Push once  dextrose 50% Injectable 25 Gram(s) IV Push once  insulin lispro (HumaLOG) corrective regimen sliding scale   SubCutaneous at bedtime  dextrose 5%. 1000 milliLiter(s) (50 mL/Hr) IV Continuous <Continuous>  dextrose 50% Injectable 12.5 Gram(s) IV Push once  dextrose 50% Injectable 25 Gram(s) IV Push once  dextrose 50% Injectable 25 Gram(s) IV Push once  enoxaparin Injectable 40 milliGRAM(s) SubCutaneous daily  cefTRIAXone   IVPB 2 Gram(s) IV Intermittent every 24 hours  vancomycin  IVPB 1000 milliGRAM(s) IV Intermittent every 12 hours    MEDICATIONS  (PRN):  HYDROmorphone  Injectable 0.5 milliGRAM(s) IV Push three times a day PRN Severe Pain (7 - 10)  oxyCODONE    5 mG/acetaminophen 325 mG 1 Tablet(s) Oral every 6 hours PRN Moderate Pain (4 - 6)  dextrose Gel 1 Dose(s) Oral once PRN Blood Glucose LESS THAN 70 milliGRAM(s)/deciliter  glucagon  Injectable 1 milliGRAM(s) IntraMuscular once PRN Glucose LESS THAN 70 milligrams/deciliter  dextrose Gel 1 Dose(s) Oral once PRN Blood Glucose LESS THAN 70 milliGRAM(s)/deciliter  glucagon  Injectable 1 milliGRAM(s) IntraMuscular once PRN Glucose LESS THAN 70 milligrams/deciliter  acetaminophen   Tablet. 650 milliGRAM(s) Oral every 6 hours PRN Moderate Pain (4 - 6)      LABS:                        11.6   12.08 )-----------( 188      ( 13 Aug 2017 08:42 )             35.3     08-13    137  |  101  |  33<H>  ----------------------------<  122<H>  4.2   |  19<L>  |  1.29    Ca    9.7      13 Aug 2017 08:36  Phos  4.1     08-13  Mg     2.1     08-13                    Hemoglobin A1C, Whole Blood: 7.4 % (08-12 @ 08:39)    Thyroid Stimulating Hormone, Serum: 2.24 uIU/mL (08-12 @ 08:48)      Cultures:    EKG:    Radiological Studies:    Consultant(s) Notes Reviewed:      Care Discussed with Consultants/Other Providers:
SUBJECTIVE / OVERNIGHT EVENTS: No nausea, vomiting or diarrhea, no fever or chills, no dizziness or chest pain, no dysuria or hematuria .      CAPILLARY BLOOD GLUCOSE  150 (12 Aug 2017 07:45)  289 (11 Aug 2017 21:30)  289 (11 Aug 2017 16:36)  245 (11 Aug 2017 14:35)        I&O's Summary      PHYSICAL EXAM:  HEAD:  Atraumatic, Normocephalic  EYES: EOMI, PERRLA, conjunctiva and sclera clear  NECK: Supple, No JVD  CHEST/LUNG: Clear to auscultation bilaterally; No wheeze  HEART: Regular rate and rhythm; No murmurs, rubs, or gallops  ABDOMEN: Soft, Nontender, Nondistended; Bowel sounds present  EXTREMITIES:  2+ Peripheral Pulses, No clubbing, cyanosis, or edema  PSYCH: AAOx3  NEUROLOGY: non-focal  SKIN: No rashes or lesions    MEDICATIONS  (STANDING):  valsartan 320 milliGRAM(s) Oral daily  levothyroxine 100 MICROGram(s) Oral daily  atorvastatin 10 milliGRAM(s) Oral at bedtime  amLODIPine   Tablet 10 milliGRAM(s) Oral daily  hydrochlorothiazide 12.5 milliGRAM(s) Oral daily  insulin lispro (HumaLOG) corrective regimen sliding scale   SubCutaneous three times a day before meals  dextrose 5%. 1000 milliLiter(s) (50 mL/Hr) IV Continuous <Continuous>  dextrose 50% Injectable 12.5 Gram(s) IV Push once  dextrose 50% Injectable 25 Gram(s) IV Push once  dextrose 50% Injectable 25 Gram(s) IV Push once  insulin lispro (HumaLOG) corrective regimen sliding scale   SubCutaneous at bedtime  dextrose 5%. 1000 milliLiter(s) (50 mL/Hr) IV Continuous <Continuous>  dextrose 50% Injectable 12.5 Gram(s) IV Push once  dextrose 50% Injectable 25 Gram(s) IV Push once  dextrose 50% Injectable 25 Gram(s) IV Push once    MEDICATIONS  (PRN):  HYDROmorphone  Injectable 0.5 milliGRAM(s) IV Push three times a day PRN Severe Pain (7 - 10)  oxyCODONE    5 mG/acetaminophen 325 mG 1 Tablet(s) Oral every 6 hours PRN Moderate Pain (4 - 6)  dextrose Gel 1 Dose(s) Oral once PRN Blood Glucose LESS THAN 70 milliGRAM(s)/deciliter  glucagon  Injectable 1 milliGRAM(s) IntraMuscular once PRN Glucose LESS THAN 70 milligrams/deciliter  dextrose Gel 1 Dose(s) Oral once PRN Blood Glucose LESS THAN 70 milliGRAM(s)/deciliter  glucagon  Injectable 1 milliGRAM(s) IntraMuscular once PRN Glucose LESS THAN 70 milligrams/deciliter      LABS:                        13.7   14.0  )-----------( 178      ( 11 Aug 2017 09:35 )             40.5     08-11    134<L>  |  95<L>  |  28<H>  ----------------------------<  262<H>  4.7   |  20<L>  |  1.07    Ca    9.4      11 Aug 2017 09:35    TPro  7.5  /  Alb  4.1  /  TBili  0.7  /  DBili  x   /  AST  96<H>  /  ALT  92<H>  /  AlkPhos  120  08-11    PT/INR - ( 11 Aug 2017 09:35 )   PT: 11.8 sec;   INR: 1.08 ratio         PTT - ( 11 Aug 2017 09:35 )  PTT:29.6 sec  CARDIAC MARKERS ( 12 Aug 2017 06:27 )  x     / <0.01 ng/mL / 57 U/L / x     / x      CARDIAC MARKERS ( 11 Aug 2017 23:36 )  x     / <0.01 ng/mL / 63 U/L / x     / 1.4 ng/mL  CARDIAC MARKERS ( 11 Aug 2017 09:35 )  x     / <0.01 ng/mL / x     / x     / 2.4 ng/mL                    Cultures:    EKG:    Radiological Studies:    Consultant(s) Notes Reviewed:      Care Discussed with Consultants/Other Providers:
SUBJECTIVE / OVERNIGHT EVENTS: No nausea, vomiting or diarrhea, no fever or chills, no dizziness or chest pain, no dysuria or hematuria .      CAPILLARY BLOOD GLUCOSE  161 (13 Aug 2017 07:37)  188 (12 Aug 2017 21:09)  167 (12 Aug 2017 16:56)  281 (12 Aug 2017 11:59)        I&O's Summary    12 Aug 2017 07:01  -  13 Aug 2017 07:00  --------------------------------------------------------  IN: 1065 mL / OUT: 0 mL / NET: 1065 mL        PHYSICAL EXAM:  HEAD:  Atraumatic, Normocephalic  EYES: EOMI, PERRLA, conjunctiva and sclera clear  NECK: Supple, No JVD  CHEST/LUNG: Clear to auscultation bilaterally; No wheeze  HEART: Regular rate and rhythm; No murmurs, rubs, or gallops  ABDOMEN: Soft, Nontender, Nondistended; Bowel sounds present  EXTREMITIES:  2+ Peripheral Pulses, No clubbing, cyanosis, or edema  PSYCH: AAOx3  NEUROLOGY: non-focal  SKIN: No rashes or lesions    MEDICATIONS  (STANDING):  valsartan 320 milliGRAM(s) Oral daily  levothyroxine 100 MICROGram(s) Oral daily  atorvastatin 10 milliGRAM(s) Oral at bedtime  amLODIPine   Tablet 10 milliGRAM(s) Oral daily  hydrochlorothiazide 12.5 milliGRAM(s) Oral daily  insulin lispro (HumaLOG) corrective regimen sliding scale   SubCutaneous three times a day before meals  dextrose 5%. 1000 milliLiter(s) (50 mL/Hr) IV Continuous <Continuous>  dextrose 50% Injectable 12.5 Gram(s) IV Push once  dextrose 50% Injectable 25 Gram(s) IV Push once  dextrose 50% Injectable 25 Gram(s) IV Push once  insulin lispro (HumaLOG) corrective regimen sliding scale   SubCutaneous at bedtime  dextrose 5%. 1000 milliLiter(s) (50 mL/Hr) IV Continuous <Continuous>  dextrose 50% Injectable 12.5 Gram(s) IV Push once  dextrose 50% Injectable 25 Gram(s) IV Push once  dextrose 50% Injectable 25 Gram(s) IV Push once  enoxaparin Injectable 40 milliGRAM(s) SubCutaneous daily  cefTRIAXone   IVPB 2 Gram(s) IV Intermittent every 24 hours  vancomycin  IVPB 1000 milliGRAM(s) IV Intermittent every 12 hours    MEDICATIONS  (PRN):  HYDROmorphone  Injectable 0.5 milliGRAM(s) IV Push three times a day PRN Severe Pain (7 - 10)  oxyCODONE    5 mG/acetaminophen 325 mG 1 Tablet(s) Oral every 6 hours PRN Moderate Pain (4 - 6)  dextrose Gel 1 Dose(s) Oral once PRN Blood Glucose LESS THAN 70 milliGRAM(s)/deciliter  glucagon  Injectable 1 milliGRAM(s) IntraMuscular once PRN Glucose LESS THAN 70 milligrams/deciliter  dextrose Gel 1 Dose(s) Oral once PRN Blood Glucose LESS THAN 70 milliGRAM(s)/deciliter  glucagon  Injectable 1 milliGRAM(s) IntraMuscular once PRN Glucose LESS THAN 70 milligrams/deciliter  acetaminophen   Tablet. 650 milliGRAM(s) Oral every 6 hours PRN Moderate Pain (4 - 6)      LABS:                        12.4   13.6  )-----------( 160      ( 12 Aug 2017 10:17 )             36.6     08-12    135  |  98  |  25<H>  ----------------------------<  288<H>  4.0   |  24  |  1.00    Ca    9.4      12 Aug 2017 10:17    TPro  7.5  /  Alb  4.1  /  TBili  0.7  /  DBili  x   /  AST  96<H>  /  ALT  92<H>  /  AlkPhos  120  08-11    PT/INR - ( 11 Aug 2017 09:35 )   PT: 11.8 sec;   INR: 1.08 ratio         PTT - ( 11 Aug 2017 09:35 )  PTT:29.6 sec  CARDIAC MARKERS ( 12 Aug 2017 06:27 )  x     / <0.01 ng/mL / 57 U/L / x     / x      CARDIAC MARKERS ( 11 Aug 2017 23:36 )  x     / <0.01 ng/mL / 63 U/L / x     / 1.4 ng/mL  CARDIAC MARKERS ( 11 Aug 2017 09:35 )  x     / <0.01 ng/mL / x     / x     / 2.4 ng/mL              Hemoglobin A1C, Whole Blood: 7.4 % (08-12 @ 08:39)    Thyroid Stimulating Hormone, Serum: 2.24 uIU/mL (08-12 @ 08:48)      Cultures:    EKG:    Radiological Studies:    Consultant(s) Notes Reviewed:      Care Discussed with Consultants/Other Providers:
SUBJECTIVE / OVERNIGHT EVENTS: No nausea, vomiting or diarrhea, no fever or chills, no dizziness or chest pain, no dysuria or hematuria .      CAPILLARY BLOOD GLUCOSE  147 (15 Aug 2017 07:35)  140 (14 Aug 2017 21:09)  157 (14 Aug 2017 16:44)  190 (14 Aug 2017 11:52)        I&O's Summary    14 Aug 2017 07:01  -  15 Aug 2017 07:00  --------------------------------------------------------  IN: 780 mL / OUT: 0 mL / NET: 780 mL        PHYSICAL EXAM:  HEAD:  Atraumatic, Normocephalic  EYES: EOMI, PERRLA, conjunctiva and sclera clear  NECK: Supple, No JVD  CHEST/LUNG: Clear to auscultation bilaterally; No wheeze  HEART: Regular rate and rhythm; No murmurs, rubs, or gallops  ABDOMEN: Soft, Nontender, Nondistended; Bowel sounds present  EXTREMITIES:  2+ Peripheral Pulses, No clubbing, cyanosis, or edema  PSYCH: AAOx3  NEUROLOGY: non-focal  SKIN: No rashes or lesions    MEDICATIONS  (STANDING):  valsartan 320 milliGRAM(s) Oral daily  levothyroxine 100 MICROGram(s) Oral daily  atorvastatin 10 milliGRAM(s) Oral at bedtime  amLODIPine   Tablet 10 milliGRAM(s) Oral daily  hydrochlorothiazide 12.5 milliGRAM(s) Oral daily  insulin lispro (HumaLOG) corrective regimen sliding scale   SubCutaneous three times a day before meals  dextrose 5%. 1000 milliLiter(s) (50 mL/Hr) IV Continuous <Continuous>  dextrose 50% Injectable 12.5 Gram(s) IV Push once  dextrose 50% Injectable 25 Gram(s) IV Push once  dextrose 50% Injectable 25 Gram(s) IV Push once  insulin lispro (HumaLOG) corrective regimen sliding scale   SubCutaneous at bedtime  dextrose 5%. 1000 milliLiter(s) (50 mL/Hr) IV Continuous <Continuous>  dextrose 50% Injectable 12.5 Gram(s) IV Push once  dextrose 50% Injectable 25 Gram(s) IV Push once  dextrose 50% Injectable 25 Gram(s) IV Push once  enoxaparin Injectable 40 milliGRAM(s) SubCutaneous daily    MEDICATIONS  (PRN):  HYDROmorphone  Injectable 0.5 milliGRAM(s) IV Push three times a day PRN Severe Pain (7 - 10)  oxyCODONE    5 mG/acetaminophen 325 mG 1 Tablet(s) Oral every 6 hours PRN Moderate Pain (4 - 6)  dextrose Gel 1 Dose(s) Oral once PRN Blood Glucose LESS THAN 70 milliGRAM(s)/deciliter  glucagon  Injectable 1 milliGRAM(s) IntraMuscular once PRN Glucose LESS THAN 70 milligrams/deciliter  dextrose Gel 1 Dose(s) Oral once PRN Blood Glucose LESS THAN 70 milliGRAM(s)/deciliter  glucagon  Injectable 1 milliGRAM(s) IntraMuscular once PRN Glucose LESS THAN 70 milligrams/deciliter  acetaminophen   Tablet. 650 milliGRAM(s) Oral every 6 hours PRN Moderate Pain (4 - 6)      LABS:                        12.2   8.6   )-----------( 227      ( 14 Aug 2017 11:29 )             36.6     08-14    138  |  101  |  35<H>  ----------------------------<  141<H>  4.0   |  20<L>  |  1.25    Ca    9.6      14 Aug 2017 08:23                    Hemoglobin A1C, Whole Blood: 7.4 % (08-12 @ 08:39)    Thyroid Stimulating Hormone, Serum: 2.24 uIU/mL (08-12 @ 08:48)      Cultures:    EKG:    Radiological Studies:    Consultant(s) Notes Reviewed:      Care Discussed with Consultants/Other Providers:

## 2017-08-15 NOTE — DISCHARGE NOTE ADULT - PATIENT PORTAL LINK FT
“You can access the [facility] Patient Portal, offered by , by registering with the following website: [email or web address]”

## 2017-08-15 NOTE — DISCHARGE NOTE ADULT - FINDINGS/TREATMENT
1. Massive rotator cuff tear with full-thickness, near full width tears   of both the supraspinatus and infraspinatus tendons. This is associated   with fatty infiltration of the supraspinatus and infraspinatus muscle   bellies.  2.  Intra-articular portion of the long head of biceps tendon is not   visualized, and is likely torn.  3.  Osteoarthritic change of the glenohumeral and acromioclavicular   joints.

## 2017-08-16 LAB
CULTURE RESULTS: SIGNIFICANT CHANGE UP
SPECIMEN SOURCE: SIGNIFICANT CHANGE UP

## 2017-08-18 LAB
CULTURE RESULTS: SIGNIFICANT CHANGE UP
CULTURE RESULTS: SIGNIFICANT CHANGE UP
SPECIMEN SOURCE: SIGNIFICANT CHANGE UP
SPECIMEN SOURCE: SIGNIFICANT CHANGE UP

## 2017-08-31 ENCOUNTER — APPOINTMENT (OUTPATIENT)
Dept: PULMONOLOGY | Facility: CLINIC | Age: 67
End: 2017-08-31

## 2017-10-19 ENCOUNTER — APPOINTMENT (OUTPATIENT)
Dept: INTERNAL MEDICINE | Facility: CLINIC | Age: 67
End: 2017-10-19
Payer: MEDICARE

## 2017-10-19 VITALS
HEIGHT: 62 IN | WEIGHT: 212 LBS | BODY MASS INDEX: 39.01 KG/M2 | DIASTOLIC BLOOD PRESSURE: 90 MMHG | OXYGEN SATURATION: 97 % | HEART RATE: 78 BPM | TEMPERATURE: 97.4 F | SYSTOLIC BLOOD PRESSURE: 160 MMHG

## 2017-10-19 VITALS — SYSTOLIC BLOOD PRESSURE: 144 MMHG | DIASTOLIC BLOOD PRESSURE: 90 MMHG

## 2017-10-19 LAB — HBA1C MFR BLD HPLC: 7.4

## 2017-10-19 PROCEDURE — 83036 HEMOGLOBIN GLYCOSYLATED A1C: CPT | Mod: QW

## 2017-10-19 PROCEDURE — 99214 OFFICE O/P EST MOD 30 MIN: CPT | Mod: 25

## 2017-10-30 ENCOUNTER — APPOINTMENT (OUTPATIENT)
Dept: ENDOCRINOLOGY | Facility: CLINIC | Age: 67
End: 2017-10-30
Payer: MEDICARE

## 2017-10-30 VITALS
SYSTOLIC BLOOD PRESSURE: 114 MMHG | HEIGHT: 62 IN | WEIGHT: 216 LBS | DIASTOLIC BLOOD PRESSURE: 80 MMHG | BODY MASS INDEX: 39.75 KG/M2

## 2017-10-30 PROCEDURE — 99204 OFFICE O/P NEW MOD 45 MIN: CPT

## 2017-11-30 ENCOUNTER — APPOINTMENT (OUTPATIENT)
Dept: ENDOCRINOLOGY | Facility: CLINIC | Age: 67
End: 2017-11-30
Payer: MEDICARE

## 2017-11-30 VITALS
SYSTOLIC BLOOD PRESSURE: 114 MMHG | DIASTOLIC BLOOD PRESSURE: 76 MMHG | BODY MASS INDEX: 36.99 KG/M2 | WEIGHT: 201 LBS | HEIGHT: 62 IN

## 2017-11-30 LAB — GLUCOSE BLDC GLUCOMTR-MCNC: 100

## 2017-11-30 PROCEDURE — 82962 GLUCOSE BLOOD TEST: CPT

## 2017-11-30 PROCEDURE — 99214 OFFICE O/P EST MOD 30 MIN: CPT | Mod: 25

## 2017-12-21 ENCOUNTER — APPOINTMENT (OUTPATIENT)
Dept: OPHTHALMOLOGY | Facility: CLINIC | Age: 67
End: 2017-12-21
Payer: MEDICARE

## 2017-12-21 PROCEDURE — 99204 OFFICE O/P NEW MOD 45 MIN: CPT

## 2017-12-21 PROCEDURE — 92015 DETERMINE REFRACTIVE STATE: CPT

## 2018-01-18 ENCOUNTER — APPOINTMENT (OUTPATIENT)
Dept: INTERNAL MEDICINE | Facility: CLINIC | Age: 68
End: 2018-01-18
Payer: MEDICARE

## 2018-01-18 ENCOUNTER — LABORATORY RESULT (OUTPATIENT)
Age: 68
End: 2018-01-18

## 2018-01-18 VITALS
HEIGHT: 62 IN | HEART RATE: 75 BPM | DIASTOLIC BLOOD PRESSURE: 80 MMHG | SYSTOLIC BLOOD PRESSURE: 140 MMHG | WEIGHT: 200 LBS | BODY MASS INDEX: 36.8 KG/M2 | TEMPERATURE: 98.3 F | OXYGEN SATURATION: 97 %

## 2018-01-18 PROCEDURE — 36415 COLL VENOUS BLD VENIPUNCTURE: CPT

## 2018-01-18 PROCEDURE — 99214 OFFICE O/P EST MOD 30 MIN: CPT | Mod: 25

## 2018-01-19 LAB
ALBUMIN SERPL ELPH-MCNC: 4.5 G/DL
ALP BLD-CCNC: 115 U/L
ALT SERPL-CCNC: 36 U/L
ANION GAP SERPL CALC-SCNC: 20 MMOL/L
APPEARANCE: CLEAR
AST SERPL-CCNC: 29 U/L
BILIRUB SERPL-MCNC: 0.4 MG/DL
BILIRUBIN URINE: NEGATIVE
BLOOD URINE: NEGATIVE
BUN SERPL-MCNC: 34 MG/DL
CALCIUM SERPL-MCNC: 10.6 MG/DL
CHLORIDE SERPL-SCNC: 102 MMOL/L
CO2 SERPL-SCNC: 17 MMOL/L
COLOR: YELLOW
CREAT SERPL-MCNC: 1.19 MG/DL
GLUCOSE QUALITATIVE U: NEGATIVE MG/DL
GLUCOSE SERPL-MCNC: 129 MG/DL
KETONES URINE: NEGATIVE
LEUKOCYTE ESTERASE URINE: NEGATIVE
NITRITE URINE: NEGATIVE
PH URINE: 5.5
POTASSIUM SERPL-SCNC: 5.3 MMOL/L
PROT SERPL-MCNC: 7.6 G/DL
PROTEIN URINE: NEGATIVE MG/DL
SODIUM SERPL-SCNC: 139 MMOL/L
SPECIFIC GRAVITY URINE: 1.01
UROBILINOGEN URINE: NEGATIVE MG/DL

## 2018-01-22 ENCOUNTER — MEDICATION RENEWAL (OUTPATIENT)
Age: 68
End: 2018-01-22

## 2018-02-02 LAB
ALBUMIN SERPL ELPH-MCNC: 4.4 G/DL
ALP BLD-CCNC: 106 U/L
ALT SERPL-CCNC: 33 U/L
ANION GAP SERPL CALC-SCNC: 20 MMOL/L
AST SERPL-CCNC: 35 U/L
BILIRUB SERPL-MCNC: 0.4 MG/DL
BUN SERPL-MCNC: 31 MG/DL
CALCIUM SERPL-MCNC: 10.2 MG/DL
CHLORIDE SERPL-SCNC: 99 MMOL/L
CO2 SERPL-SCNC: 19 MMOL/L
CREAT SERPL-MCNC: 1.38 MG/DL
GLUCOSE SERPL-MCNC: 103 MG/DL
HBA1C MFR BLD HPLC: 6.5 %
POTASSIUM SERPL-SCNC: 4.7 MMOL/L
PROT SERPL-MCNC: 7.8 G/DL
SODIUM SERPL-SCNC: 138 MMOL/L
TSH SERPL-ACNC: 2.52 UIU/ML

## 2018-03-08 ENCOUNTER — APPOINTMENT (OUTPATIENT)
Dept: GASTROENTEROLOGY | Facility: CLINIC | Age: 68
End: 2018-03-08
Payer: MEDICARE

## 2018-03-08 VITALS
HEIGHT: 62 IN | DIASTOLIC BLOOD PRESSURE: 90 MMHG | BODY MASS INDEX: 36.8 KG/M2 | SYSTOLIC BLOOD PRESSURE: 145 MMHG | HEART RATE: 76 BPM | WEIGHT: 200 LBS

## 2018-03-08 DIAGNOSIS — Z86.010 PERSONAL HISTORY OF COLONIC POLYPS: ICD-10-CM

## 2018-03-08 DIAGNOSIS — Z12.11 ENCOUNTER FOR SCREENING FOR MALIGNANT NEOPLASM OF COLON: ICD-10-CM

## 2018-03-08 PROCEDURE — 99204 OFFICE O/P NEW MOD 45 MIN: CPT

## 2018-04-02 ENCOUNTER — MEDICATION RENEWAL (OUTPATIENT)
Age: 68
End: 2018-04-02

## 2018-04-19 ENCOUNTER — LABORATORY RESULT (OUTPATIENT)
Age: 68
End: 2018-04-19

## 2018-04-19 ENCOUNTER — APPOINTMENT (OUTPATIENT)
Dept: GASTROENTEROLOGY | Facility: CLINIC | Age: 68
End: 2018-04-19
Payer: MEDICARE

## 2018-04-19 PROCEDURE — 45380 COLONOSCOPY AND BIOPSY: CPT | Mod: PT

## 2018-05-17 NOTE — ED ADULT NURSE REASSESSMENT NOTE - INTEGUMENTARY WDL
Color consistent with ethnicity/race, warm, dry intact, resilient.
Color consistent with ethnicity/race, warm, dry intact, resilient.
family/patient

## 2018-06-21 ENCOUNTER — APPOINTMENT (OUTPATIENT)
Dept: INTERNAL MEDICINE | Facility: CLINIC | Age: 68
End: 2018-06-21
Payer: MEDICARE

## 2018-06-21 ENCOUNTER — APPOINTMENT (OUTPATIENT)
Dept: ENDOCRINOLOGY | Facility: CLINIC | Age: 68
End: 2018-06-21
Payer: MEDICARE

## 2018-06-21 ENCOUNTER — RESULT CHARGE (OUTPATIENT)
Age: 68
End: 2018-06-21

## 2018-06-21 VITALS
HEIGHT: 62 IN | OXYGEN SATURATION: 97 % | WEIGHT: 205 LBS | SYSTOLIC BLOOD PRESSURE: 124 MMHG | DIASTOLIC BLOOD PRESSURE: 74 MMHG | BODY MASS INDEX: 37.73 KG/M2 | HEART RATE: 87 BPM

## 2018-06-21 VITALS
BODY MASS INDEX: 37.91 KG/M2 | SYSTOLIC BLOOD PRESSURE: 140 MMHG | HEART RATE: 83 BPM | DIASTOLIC BLOOD PRESSURE: 80 MMHG | HEIGHT: 62 IN | WEIGHT: 206 LBS | OXYGEN SATURATION: 98 % | TEMPERATURE: 97.5 F

## 2018-06-21 DIAGNOSIS — R79.89 OTHER SPECIFIED ABNORMAL FINDINGS OF BLOOD CHEMISTRY: ICD-10-CM

## 2018-06-21 LAB — GLUCOSE BLDC GLUCOMTR-MCNC: 185

## 2018-06-21 PROCEDURE — G0444 DEPRESSION SCREEN ANNUAL: CPT | Mod: 59

## 2018-06-21 PROCEDURE — 82962 GLUCOSE BLOOD TEST: CPT

## 2018-06-21 PROCEDURE — G0009: CPT

## 2018-06-21 PROCEDURE — 90732 PPSV23 VACC 2 YRS+ SUBQ/IM: CPT

## 2018-06-21 PROCEDURE — 99214 OFFICE O/P EST MOD 30 MIN: CPT | Mod: 25

## 2018-06-21 PROCEDURE — G0439: CPT | Mod: 25

## 2018-06-21 PROCEDURE — 36415 COLL VENOUS BLD VENIPUNCTURE: CPT

## 2018-06-21 NOTE — REVIEW OF SYSTEMS
[Negative] : Heme/Lymph [Wheezing] : wheezing [Cough] : cough [Dyspnea on Exertion] : dyspnea on exertion [Back Pain] : back pain [FreeTextEntry6] : at base line

## 2018-06-21 NOTE — ASSESSMENT
[FreeTextEntry1] : Eleavted Cr- repeat CMP 1/2018 nl  , advised fluid hydration , avoid NSAID \par \par Depression/ anxiety - \par PHQ -2 -negative \par -Depression screening done at this visit. 5-7  minute spent in assessment and review.\par -Denies homicidal or suicidal ideas or thoughts\par \par LEFT ROTATOR CUFF TEAR-MRI shoulder reviewed  \par -better , saw ortho no pain now , rom ok , conservative rx \par \par Elevated liver enzymes, morbid obesity, BMI 38--resolved \par -Acute hepatitis panel negative\par -Discuss weight loss\par \par Small pericardial effusion - new onset 1/2017 when she was admitted for acute bronchitis and sob -saw Cardio DR Chaves on [facility] - did echo , stress test - 1/2017 reviewed.\par -denies CP , + MARK at base line , or palpations, or dizzy spells \par -Report from the cardiologist requested\par \par Emphysema 1/2017 - smoked 2 PPD for 30 yrs , stopped 3 yrs ago when she had pneumonia , taking inhalers Spiriva and ProAir as needed.\par - CT chest 1/2017 emphysema and nodules small -reviewed , repeat CT chest 8/2017 - reviewed stable .will make apt with pulm \par - MARK at base line , rx for walker with seat given \par - handicap permit signed \par \par DM x 2 - for 2 yrs \par -Check A1c- 11/2017 -6.5\par -recently increased metformin to 1000 BID \par - Ophthalmologist-consult reviewed up to date \par  \par Hypothyroidism - x 1 yrs - stable \par -on levothyroxine 100 mcg daily ,\par \par HTN- 4 ysr -Better controlled\par -on Irbesartan HCTZ 300 -12.5 and Amlodipine 5 mg daily \par \par Osteoporosis 6/2017 \par saw endo started Ibandronate 150 po monthly since 8 months - has apt today \par - 0n  Calcium 1200 mg daily \par -on  vitamin d 1000 units daily \par - do weight bearing exercises: walking with weights, stair climbing , weight training , jogging, aerobics etc \par -repeat test 2 yrs for a f/u\par \par HCM \par PAP- 3/2017 f/u advised \par colonoscope -4/2018 reviewed - polyp- repeat 5 yrs \par mammo- 7/2017 rx given \par Bone density - June 2017- osteoporosis followed by  endocrinologist on rx \par Prevnar 13 given 5/2017\par Pneumovax -given today \par Hep C Negative\par flu vaccine received in pharmacy \par shingles advised - pt to check insurance and make apt \par HIV screen - refused

## 2018-06-21 NOTE — HEALTH RISK ASSESSMENT
[Good] : ~his/her~  mood as  good [No falls in past year] : Patient reported no falls in the past year [0] : 2) Feeling down, depressed, or hopeless: Not at all (0) [Patient reported PAP Smear was normal] : Patient reported PAP Smear was normal [HIV test declined] : HIV test declined [None] : None [With Significant Other] : lives with significant other [Employed] : employed [] :  [Feels Safe at Home] : Feels safe at home [Fully functional (bathing, dressing, toileting, transferring, walking, feeding)] : Fully functional (bathing, dressing, toileting, transferring, walking, feeding) [Fully functional (using the telephone, shopping, preparing meals, housekeeping, doing laundry, using] : Fully functional and needs no help or supervision to perform IADLs (using the telephone, shopping, preparing meals, housekeeping, doing laundry, using transportation, managing medications and managing finances) [Smoke Detector] : smoke detector [Carbon Monoxide Detector] : carbon monoxide detector [Discussed at today's visit] : Advance Directives Discussed at today's visit [Designated Healthcare Proxy] : Designated healthcare proxy [Name: ___] : Health Care Proxy's Name: [unfilled]  [Relationship: ___] : Relationship: [unfilled] [] : No [de-identified] : none  [de-identified] : 2 x a week  [[id number]] : 0 [Reports changes in hearing] : Reports no changes in hearing [Reports changes in vision] : Reports no changes in vision [Reports changes in dental health] : Reports no changes in dental health [PapSmearDate] : 3/2017  [HepatitisCDate] : 5/4/17 [HepatitisCComments] : neg [de-identified] : 12/2017

## 2018-06-21 NOTE — HISTORY OF PRESENT ILLNESS
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 \par \par left shoulder rotator cuff tear - 2017 \par -saw ortho was told she is too old for sx , advised physical therapy \par -was admitted 8/11/17 - left shoulder pain ACS was r/o had tap joint r/o septic arthritis , did MRi dx as left shoulder rotator cuff tear \par - saw ortho too 5 days percoset stopped - ortho did not recommend Pt , now better no pain - was advised not to do things above her shoulder , weights etc \par \par Elevated liver enzymes - last blood work improving - denies drinking alcohol a lot only occ wine q weekly , taking Excedrin , no herbal medications \par -acute hep panel negative \par  \par DM - saw endo  Aic 11/2017 was 6.5 , lost 20 lbs in last 6 months , saw ophtho mild cataracts no retinopathy or neuropathy \par - take medications daily, eat low carbohydrate diet, loose weight\par -Cr repeat was nl 1/2018\par  \par Hypothyroidism- TSH - stable on rx levothyroxine\par \par osteoporosis- did bone density 6/2017 followed by endo \par - now on  Ibandronate 150 po monthly since 8 months - has apt today \par -started vit d and calcium 1200 mg \par \par Small pericardial effusion - new onset 1/2017 when she was admitted for acute bronchitis and sob -saw Cardio DR Shankar Barnhart on [facility] - did echo , stress test - 1/2017 \par - saw cardio 2 weeks ago 6/13 BP was elevated he changed medication - he stopped losartan HCTZ \par -started on irbesartan HCTZ 300-12.5 , still on amlodipine 5 mg \par -he stopped atorvastatin \par \par Emphysema 1/2017 - smoked 2 PPD for 30 yrs , stopped 3 yrs ago when she had pneumonia , taking inhalers spiriva and proair as needed \par -Ct chest 1/2017 emphysema and nodules small \par -Saw pulmonologist consult reviewed- Due for CT scan chest in July- will schedule apt  \par -Working up for obstructive sleep apnea \par -needs parking permit renewed \par \par HTN- 4 ysr \par -Do to eleviated pressure, medication was changed to irbesartan hctz 300-12.5 and Amlodipine 5 mg daily \par \par did colonoscope 4 yrs ago - with [facility] \par Mammo - 6/2017 reviewed \par PAP- 3/2017 \par \par

## 2018-06-22 ENCOUNTER — RX RENEWAL (OUTPATIENT)
Age: 68
End: 2018-06-22

## 2018-06-22 LAB
25(OH)D3 SERPL-MCNC: 27.1 NG/ML
ALBUMIN SERPL ELPH-MCNC: 4.5 G/DL
ALP BLD-CCNC: 96 U/L
ALT SERPL-CCNC: 18 U/L
ANION GAP SERPL CALC-SCNC: 15 MMOL/L
APPEARANCE: CLEAR
AST SERPL-CCNC: 24 U/L
BASOPHILS # BLD AUTO: 0.06 K/UL
BASOPHILS NFR BLD AUTO: 0.6 %
BILIRUB SERPL-MCNC: 0.4 MG/DL
BILIRUBIN URINE: NEGATIVE
BLOOD URINE: NEGATIVE
BUN SERPL-MCNC: 25 MG/DL
CALCIUM SERPL-MCNC: 9.3 MG/DL
CHLORIDE SERPL-SCNC: 106 MMOL/L
CHOLEST SERPL-MCNC: 173 MG/DL
CHOLEST/HDLC SERPL: 3.2 RATIO
CO2 SERPL-SCNC: 20 MMOL/L
COLOR: YELLOW
CREAT SERPL-MCNC: 1.05 MG/DL
EOSINOPHIL # BLD AUTO: 0.21 K/UL
EOSINOPHIL NFR BLD AUTO: 2.1 %
GLUCOSE QUALITATIVE U: NEGATIVE MG/DL
GLUCOSE SERPL-MCNC: 93 MG/DL
HBA1C MFR BLD HPLC: 6.6 %
HCT VFR BLD CALC: 40.4 %
HDLC SERPL-MCNC: 54 MG/DL
HGB BLD-MCNC: 12.6 G/DL
IMM GRANULOCYTES NFR BLD AUTO: 0.3 %
KETONES URINE: NEGATIVE
LDLC SERPL CALC-MCNC: 72 MG/DL
LEUKOCYTE ESTERASE URINE: NEGATIVE
LYMPHOCYTES # BLD AUTO: 3.5 K/UL
LYMPHOCYTES NFR BLD AUTO: 35.8 %
MAN DIFF?: NORMAL
MCHC RBC-ENTMCNC: 29 PG
MCHC RBC-ENTMCNC: 31.2 GM/DL
MCV RBC AUTO: 93.1 FL
MONOCYTES # BLD AUTO: 1.03 K/UL
MONOCYTES NFR BLD AUTO: 10.5 %
NEUTROPHILS # BLD AUTO: 4.94 K/UL
NEUTROPHILS NFR BLD AUTO: 50.7 %
NITRITE URINE: NEGATIVE
PH URINE: 5
PLATELET # BLD AUTO: 221 K/UL
POTASSIUM SERPL-SCNC: 5.1 MMOL/L
PROT SERPL-MCNC: 7.3 G/DL
PROTEIN URINE: NEGATIVE MG/DL
RBC # BLD: 4.34 M/UL
RBC # FLD: 15 %
SODIUM SERPL-SCNC: 141 MMOL/L
SPECIFIC GRAVITY URINE: 1.02
TRIGL SERPL-MCNC: 237 MG/DL
TSH SERPL-ACNC: 2.89 UIU/ML
UROBILINOGEN URINE: NEGATIVE MG/DL
VIT B12 SERPL-MCNC: 298 PG/ML
WBC # FLD AUTO: 9.77 K/UL

## 2018-07-27 PROBLEM — Z12.31 BREAST CANCER SCREENING: Status: ACTIVE | Noted: 2017-03-07

## 2018-09-11 ENCOUNTER — APPOINTMENT (OUTPATIENT)
Dept: ENDOCRINOLOGY | Facility: CLINIC | Age: 68
End: 2018-09-11

## 2018-09-14 ENCOUNTER — RX RENEWAL (OUTPATIENT)
Age: 68
End: 2018-09-14

## 2018-09-19 ENCOUNTER — FORM ENCOUNTER (OUTPATIENT)
Age: 68
End: 2018-09-19

## 2018-09-20 ENCOUNTER — APPOINTMENT (OUTPATIENT)
Dept: INTERNAL MEDICINE | Facility: CLINIC | Age: 68
End: 2018-09-20
Payer: MEDICARE

## 2018-09-20 ENCOUNTER — OUTPATIENT (OUTPATIENT)
Dept: OUTPATIENT SERVICES | Facility: HOSPITAL | Age: 68
LOS: 1 days | End: 2018-09-20
Payer: MEDICARE

## 2018-09-20 ENCOUNTER — APPOINTMENT (OUTPATIENT)
Dept: ULTRASOUND IMAGING | Facility: IMAGING CENTER | Age: 68
End: 2018-09-20
Payer: MEDICARE

## 2018-09-20 ENCOUNTER — APPOINTMENT (OUTPATIENT)
Dept: MAMMOGRAPHY | Facility: IMAGING CENTER | Age: 68
End: 2018-09-20
Payer: MEDICARE

## 2018-09-20 ENCOUNTER — APPOINTMENT (OUTPATIENT)
Dept: RADIOLOGY | Facility: IMAGING CENTER | Age: 68
End: 2018-09-20
Payer: MEDICARE

## 2018-09-20 VITALS
TEMPERATURE: 97.8 F | OXYGEN SATURATION: 97 % | WEIGHT: 206 LBS | HEIGHT: 62 IN | HEART RATE: 76 BPM | SYSTOLIC BLOOD PRESSURE: 140 MMHG | DIASTOLIC BLOOD PRESSURE: 80 MMHG | BODY MASS INDEX: 37.91 KG/M2

## 2018-09-20 DIAGNOSIS — R79.89 OTHER SPECIFIED ABNORMAL FINDINGS OF BLOOD CHEMISTRY: ICD-10-CM

## 2018-09-20 DIAGNOSIS — Z00.8 ENCOUNTER FOR OTHER GENERAL EXAMINATION: ICD-10-CM

## 2018-09-20 LAB — HBA1C MFR BLD HPLC: 6.8

## 2018-09-20 PROCEDURE — 83036 HEMOGLOBIN GLYCOSYLATED A1C: CPT | Mod: QW

## 2018-09-20 PROCEDURE — 76641 ULTRASOUND BREAST COMPLETE: CPT | Mod: 26,50

## 2018-09-20 PROCEDURE — 77067 SCR MAMMO BI INCL CAD: CPT

## 2018-09-20 PROCEDURE — 77063 BREAST TOMOSYNTHESIS BI: CPT

## 2018-09-20 PROCEDURE — 77067 SCR MAMMO BI INCL CAD: CPT | Mod: 26

## 2018-09-20 PROCEDURE — 77063 BREAST TOMOSYNTHESIS BI: CPT | Mod: 26

## 2018-09-20 PROCEDURE — 99214 OFFICE O/P EST MOD 30 MIN: CPT | Mod: 25

## 2018-09-20 PROCEDURE — 96372 THER/PROPH/DIAG INJ SC/IM: CPT

## 2018-09-20 PROCEDURE — 76641 ULTRASOUND BREAST COMPLETE: CPT

## 2018-09-20 RX ORDER — CYANOCOBALAMIN 1000 UG/ML
1000 INJECTION INTRAMUSCULAR; SUBCUTANEOUS
Qty: 0 | Refills: 0 | Status: COMPLETED | OUTPATIENT
Start: 2018-09-20

## 2018-09-20 RX ADMIN — CYANOCOBALAMIN 0 MCG/ML: 1000 INJECTION INTRAMUSCULAR; SUBCUTANEOUS at 00:00

## 2018-09-20 NOTE — HISTORY OF PRESENT ILLNESS
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 \par \par left shoulder rotator cuff tear - 2017 \par -saw ortho was told she is too old for sx , advised physical therapy \par -was admitted 8/11/17 - left shoulder pain ACS was r/o had tap joint r/o septic arthritis , did MRi dx as left shoulder rotator cuff tear \par - saw ortho too 5 days percoset stopped - ortho did not recommend Pt , now better no pain - was advised not to do things above her shoulder , weights etc \par \par Elevated liver enzymes - last blood work improving - denies drinking alcohol a lot only occ wine q weekly , taking Excedrin , no herbal medications \par -acute hep panel negative \par  \par DM - saw endo  Aic 11/2017 was 6.5 , lost 20 lbs in last 6 months , saw ophtho mild cataracts no retinopathy or neuropathy \par - take medications daily, eat low carbohydrate diet, loose weight\par -Cr repeat was nl 1/2018\par  \par Hypothyroidism- TSH - stable on rx levothyroxine\par \par osteoporosis- did bone density 6/2017 followed by endo \par - now on  Ibandronate 150 po monthly since 8 months - has apt today \par -started vit d and calcium 1200 mg \par \par Small pericardial effusion - new onset 1/2017 when she was admitted for acute bronchitis and sob -saw Cardio DR Shankar Barnhart on [facility] - did echo , stress test - 1/2017 \par - saw cardio 2 weeks ago 6/13 BP was elevated he changed medication - he stopped losartan HCTZ \par -started on irbesartan HCTZ 300-12.5 , still on amlodipine 5 mg \par -he stopped atorvastatin \par \par Emphysema 1/2017 - smoked 2 PPD for 30 yrs , stopped 3 yrs ago when she had pneumonia , taking inhalers Spiriva and ProAir as needed \par -Ct chest 1/2017 emphysema and nodules small \par -Saw pulmonologist consult reviewed- Due for CT scan chest in July- will schedule apt  \par -Working up for obstructive sleep apnea \par \par HTN- 4 ysr \par - irbesartan hctz 300 -12.5 and Amlodipine 5 mg daily \par \par did colonoscope 4 yrs ago - with [facility] \par Mammo - 6/2017 reviewed \par PAP- 3/2017 \par \par

## 2018-09-20 NOTE — ASSESSMENT
[FreeTextEntry1] : Eleavted Cr- repeat CMP 1/2018 nl  , advised fluid hydration , avoid NSAID \par \par Depression/ anxiety - \par PHQ -2 -negative \par -Depression screening done at this visit. 5-7  minute spent in assessment and review.\par -Denies homicidal or suicidal ideas or thoughts\par \par LEFT ROTATOR CUFF TEAR-MRI shoulder reviewed  \par -better , saw ortho no pain now , rom ok , conservative rx \par \par Elevated liver enzymes, morbid obesity, BMI 38--resolved \par -Acute hepatitis panel negative\par -Discuss weight loss\par \par Small pericardial effusion - new onset 1/2017 when she was admitted for acute bronchitis and sob -[place] Cardio [place] Chaves [place] [facility] - did echo , stress test - 1/2017 reviewed.\par -denies CP , + MARK at base line , or palpations, or dizzy spells \par -Report from the cardiologist requested\par \par Emphysema 1/2017 - smoked 2 PPD for 30 yrs , stopped 3 yrs ago when she had pneumonia , taking inhalers Spiriva and ProAir as needed.\par - CT chest 1/2017 emphysema and nodules small -reviewed , repeat CT chest 8/2017 - reviewed stable .will make apt with pulm \par - MARK at base line , rx for walker with seat given \par - handicap permit signed \par \par DM x 2 - for 2 yrs \par -Check A1c- 6.8 \par -recently increased metformin to 1000 BID \par - Ophthalmologist-consult reviewed up to date\par \par b12 defeciency - b12 shot given today - 1000 mcg po daily advised  \par  \par Hypothyroidism - x 1 yrs - stable \par -on levothyroxine 100 mcg daily ,\par \par HTN- 4 ysr -Better controlled\par -on Irbesartan HCTZ 300 -12.5 and Amlodipine 5 mg daily \par \par Osteoporosis 6/2017 \par saw endo started Ibandronate 150 po monthly since 1 yr now  -gets it on 7th day of the Month \par - 0n  Calcium 1200 mg daily \par -on  vitamin d 1000 units daily \par - do weight bearing exercises: walking with weights, stair climbing , weight training , jogging, aerobics etc \par -repeat test 2 yrs for a f/u\par \par HCM \par PAP- 3/2017 f/u advised \par colonoscope -4/2018 reviewed - polyp- repeat 5 yrs \par mammo- 7/2017 rx given , has appt today \par Bone density - June 2017- osteoporosis followed by  endocrinologist on rx \par Prevnar 13 given 5/2017\par Pneumovax -given 6/2018 \par Hep C Negative\par flu vaccine received in pharmacy \par shingles advised - pt to check insurance and make apt \par HIV screen - refused

## 2018-09-21 LAB
CREAT SPEC-SCNC: 90 MG/DL
MICROALBUMIN 24H UR DL<=1MG/L-MCNC: <1.2 MG/DL
MICROALBUMIN/CREAT 24H UR-RTO: NORMAL

## 2018-10-04 ENCOUNTER — RX RENEWAL (OUTPATIENT)
Age: 68
End: 2018-10-04

## 2018-12-12 ENCOUNTER — RX RENEWAL (OUTPATIENT)
Age: 68
End: 2018-12-12

## 2019-01-10 ENCOUNTER — APPOINTMENT (OUTPATIENT)
Dept: INTERNAL MEDICINE | Facility: CLINIC | Age: 69
End: 2019-01-10
Payer: MEDICARE

## 2019-01-10 VITALS
WEIGHT: 214 LBS | OXYGEN SATURATION: 97 % | DIASTOLIC BLOOD PRESSURE: 90 MMHG | BODY MASS INDEX: 39.38 KG/M2 | HEIGHT: 62 IN | SYSTOLIC BLOOD PRESSURE: 160 MMHG | HEART RATE: 79 BPM | TEMPERATURE: 97.7 F

## 2019-01-10 DIAGNOSIS — N63.10 UNSPECIFIED LUMP IN THE RIGHT BREAST, UNSPECIFIED QUADRANT: ICD-10-CM

## 2019-01-10 LAB — HBA1C MFR BLD HPLC: 6.7

## 2019-01-10 PROCEDURE — 99214 OFFICE O/P EST MOD 30 MIN: CPT | Mod: 25

## 2019-01-10 PROCEDURE — 83036 HEMOGLOBIN GLYCOSYLATED A1C: CPT | Mod: QW

## 2019-01-10 PROCEDURE — 96372 THER/PROPH/DIAG INJ SC/IM: CPT

## 2019-01-10 RX ORDER — CYANOCOBALAMIN 1000 UG/ML
1000 INJECTION INTRAMUSCULAR; SUBCUTANEOUS
Qty: 0 | Refills: 0 | Status: COMPLETED | OUTPATIENT
Start: 2019-01-10

## 2019-01-10 RX ADMIN — CYANOCOBALAMIN 0 MCG/ML: 1000 INJECTION INTRAMUSCULAR; SUBCUTANEOUS at 00:00

## 2019-01-10 NOTE — REVIEW OF SYSTEMS
[Negative] : Gastrointestinal [Chest Pain] : no chest pain [Palpitations] : no palpitations [Shortness Of Breath] : no shortness of breath [Dyspnea on Exertion] : no dyspnea on exertion

## 2019-01-10 NOTE — ASSESSMENT
[FreeTextEntry1] : \par LEFT ROTATOR CUFF TEAR-MRI shoulder reviewed  \par -better , saw ortho no pain now , rom ok , conservative rx \par \par Elevated liver enzymes, morbid obesity, BMI 38--resolved \par -Acute hepatitis panel negative\par -Discuss weight loss\par \par Small pericardial effusion - new onset 1/2017 when she was admitted for acute bronchitis and sob -[facility] - did echo , stress test - 1/2017 reviewed.\par -denies CP , + MARK at base line , or palpations, or dizzy spells \par -Report from the cardiologist requested\par \par Emphysema 1/2017 - smoked 2 PPD for 30 yrs , stopped 3 yrs ago when she had pneumonia , taking inhalers Spiriva and ProAir as needed.\par - CT chest 1/2017 emphysema and nodules small -reviewed , repeat CT chest 8/2017 - reviewed stable .will make apt with pulm \par - MARK at base line , rx for walker with seat given \par - handicap permit signed \par \par DM x 2 - for 2 yrs \par -Check A1c- 6.7 \par -recently increased metformin to 1000 BID \par - Ophthalmologist-consult advised due last 12/2017 \par - ASCVD risk - high change atorvastatin to 40 repeat LFT and Lipids 3 months \par - on ASA 81 \par \par b12 defeciency - b12 shot given today - 1000 mcg po daily advised  \par  \par Hypothyroidism - x 1 yrs - stable \par -on levothyroxine 100 mcg daily ,\par \par HTN- 4 ysr -elevated today - was rushing = check BP next visit 170/98 \par -on Irbesartan HCTZ 300 -12.5 and Amlodipine 5 mg daily \par \par Osteoporosis 6/2017 \par saw endo started Ibandronate 150 po monthly since > 1 yr now  -gets it on 7th day of the Month \par - 0n  Calcium 1200 mg daily \par -on  vitamin d 1000 units daily \par - do weight bearing exercises: walking with weights, stair climbing , weight training , jogging, aerobics etc \par -repeat test 2 yrs for a f/u\par \par HCM \par PAP- 3/2017 f/u advised \par colonoscope -4/2018 reviewed - polyp- repeat 5 yrs \par mammo- 9/2018  birad 3 due US 6 months reefrral given due 3/2019 \par Bone density - June 2017- osteoporosis followed by  endocrinologist on rx \par Prevnar 13 given 5/2017\par Pneumovax -given 6/2018 \par Hep C Negative\par flu vaccine received in pharmacy - 9/2018 \par shingles advised - pt to check insurance and make apt \par HIV screen - refused

## 2019-01-10 NOTE — HISTORY OF PRESENT ILLNESS
[de-identified] : Lives in [place] \par sees Dr Trevon Berry - got referral from her \par  , 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 \par \par left shoulder rotator cuff tear - 2017 \par -saw ortho was told she is too old for sx , advised physical therapy \par -was admitted 8/11/17 - left shoulder pain ACS was r/o had tap joint r/o septic arthritis , did MRi dx as left shoulder rotator cuff tear \par - saw ortho too 5 days percoset stopped - ortho did not recommend Pt , now better no pain - was advised not to do things above her shoulder , weights etc \par \par Elevated liver enzymes - last blood work improving - denies drinking alcohol a lot only occ wine q weekly , taking Excedrin , no herbal medications \par -acute hep panel negative \par  \par DM - followed by ENDO - Dr Lopez left [facility] needs meds renewed  , lost 20 lbs in last 6 months , saw ophtho mild cataracts no retinopathy or neuropathy \par - take medications daily, eat low carbohydrate diet, loose weight\par -Cr repeat was nl 1/2018\par  \par Hypothyroidism- TSH - stable on rx levothyroxine\par \par osteoporosis- did bone density 6/2017 followed by endo \par - now on  Ibandronate 150 po monthly since 8 months - has apt today \par -started vit d and calcium 1200 mg \par \par Small pericardial effusion - new onset 1/2017 when she was admitted for acute bronchitis and sob -saw Cardio DR Shankar Barnhart on [facility] - did echo , stress test - 1/2017 \par - saw cardio 2 weeks ago 6/13 BP was elevated he changed medication - he stopped losartan HCTZ \par -started on irbesartan HCTZ 300-12.5 , still on amlodipine 5 mg \par -he stopped atorvastatin \par \par Emphysema 1/2017 - smoked 2 PPD for 30 yrs , stopped 3 yrs ago when she had pneumonia , taking inhalers Spiriva and ProAir as needed \par -Ct chest 1/2017 emphysema and nodules small \par -Saw pulmonologist consult reviewed- Due for CT scan chest in July- will schedule apt  \par -Working up for obstructive sleep apnea \par \par HTN- 4 ysr \par - irbesartan hctz 300 -12.5 and Amlodipine 5 mg daily \par \par did colonoscope 2018 reviewed \par Mammo - 9/2018 bi rad 3 - rt breast nodule due rt breast us 3/2019 referral given \par PAP- 3/2017 \par \par

## 2019-02-10 PROBLEM — I45.10 RIGHT BUNDLE BRANCH BLOCK (RBBB): Status: ACTIVE | Noted: 2017-03-31

## 2019-02-28 ENCOUNTER — RX RENEWAL (OUTPATIENT)
Age: 69
End: 2019-02-28

## 2019-03-18 ENCOUNTER — RX RENEWAL (OUTPATIENT)
Age: 69
End: 2019-03-18

## 2019-03-28 ENCOUNTER — APPOINTMENT (OUTPATIENT)
Dept: INTERNAL MEDICINE | Facility: CLINIC | Age: 69
End: 2019-03-28
Payer: MEDICARE

## 2019-03-28 VITALS
HEIGHT: 62 IN | TEMPERATURE: 97.4 F | WEIGHT: 211 LBS | DIASTOLIC BLOOD PRESSURE: 78 MMHG | HEART RATE: 85 BPM | SYSTOLIC BLOOD PRESSURE: 132 MMHG | OXYGEN SATURATION: 97 % | BODY MASS INDEX: 38.83 KG/M2

## 2019-03-28 LAB — HBA1C MFR BLD HPLC: 6.6

## 2019-03-28 PROCEDURE — 99214 OFFICE O/P EST MOD 30 MIN: CPT | Mod: 25

## 2019-03-28 PROCEDURE — 96372 THER/PROPH/DIAG INJ SC/IM: CPT

## 2019-03-28 PROCEDURE — 83036 HEMOGLOBIN GLYCOSYLATED A1C: CPT | Mod: QW

## 2019-03-28 RX ORDER — CYANOCOBALAMIN 1000 UG/ML
1000 INJECTION INTRAMUSCULAR; SUBCUTANEOUS
Qty: 0 | Refills: 0 | Status: COMPLETED | OUTPATIENT
Start: 2019-03-28

## 2019-03-28 RX ADMIN — CYANOCOBALAMIN 0 MCG/ML: 1000 INJECTION INTRAMUSCULAR; SUBCUTANEOUS at 00:00

## 2019-03-28 NOTE — HISTORY OF PRESENT ILLNESS
[de-identified] : Lives in [place] \par sees Dr Trevon Berry - got referral from her \par  , 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 \par \par tripped and fall x 2 months ago - no trauma to head she broke her fall with rt hand - was not able to lift rt shoulder now she can do that - ROM better \par \par left shoulder rotator cuff tear - 2017 - better ROM no pain \par -saw ortho was told she is too old for sx , advised physical therapy \par -was admitted 8/11/17 - left shoulder pain ACS was r/o had tap joint r/o septic arthritis , did MRi dx as left shoulder rotator cuff tear \par - saw ortho too 5 days percoset stopped - ortho did not recommend Pt , now better no pain - was advised not to do things above her shoulder , weights etc \par  \par DM - followed by ENDO - Dr Lopez left [facility] needs meds renewed  , lost 20 lbs in last 6 months , saw ophtho mild cataracts no retinopathy or neuropathy \par - take medications daily, eat low carbohydrate diet, loose weight\par -Cr repeat was nl 1/2018\par - saw ophtho 10/2018 no diabetic retinopathy \par  \par Hypothyroidism- TSH - stable on rx levothyroxine\par \par osteoporosis- did bone density 6/2017 followed by endo \par - now on  Ibandronate 150 po monthly since 8 months - has apt today \par -started vit d and calcium 1200 mg \par \par Small pericardial effusion - new onset 1/2017 when she was admitted for acute bronchitis and sob -saw Cardio DR Shankar Barnhart on [facility] - did echo , stress test - 1/2017 \par - saw cardio 2 weeks ago 6/13 BP was elevated he changed medication - he stopped losartan HCTZ \par -started on irbesartan HCTZ 300-12.5 , still on amlodipine 5 mg \par -he stopped atorvastatin \par \par Emphysema 1/2017 - smoked 2 PPD for 30 yrs , stopped 3 yrs ago when she had pneumonia , taking inhalers Spiriva and ProAir as needed \par -Ct chest 1/2017 emphysema and nodules small \par -Saw pulmonologist consult reviewed- Due for CT scan chest in July- will schedule apt  \par -Working up for obstructive sleep apnea \par \par HTN- 4 ysr \par - irbesartan hctz 300 -12.5 and Amlodipine 5 mg daily \par \par did colonoscope 2018 reviewed \par Mammo - 9/2018 bi rad 3 - rt breast nodule due rt breast us 3/2019 referral given again pt will scheduel appt \par PAP- 3/2017 \par \par

## 2019-03-28 NOTE — REVIEW OF SYSTEMS
[Negative] : Gastrointestinal [Chest Pain] : no chest pain [Lower Ext Edema] : no lower extremity edema [Shortness Of Breath] : no shortness of breath [Dyspnea on Exertion] : no dyspnea on exertion [Abdominal Pain] : no abdominal pain [Diarrhea] : diarrhea [FreeTextEntry9] : see hpi

## 2019-03-28 NOTE — ASSESSMENT
[FreeTextEntry1] : \par right shoulder tendonitis- better improving \par -doing home PT \par \par LEFT ROTATOR CUFF TEAR-MRI shoulder reviewed  \par -better , saw ortho no pain now , rom ok , conservative rx \par \par Elevated liver enzymes, morbid obesity, BMI 38--resolved \par -Acute hepatitis panel negative\par -Discuss weight loss\par \par Small pericardial effusion - new onset 1/2017 when she was admitted for acute bronchitis and sob -saw Cardio DR Cahves on [facility] - did echo , stress test - 1/2017 reviewed.\par -denies CP , + MARK at base line , or palpations, or dizzy spells \par -Report from the cardiologist requested\par \par Emphysema 1/2017 - smoked 2 PPD for 30 yrs , stopped 3 yrs ago when she had pneumonia , taking inhalers Spiriva and ProAir as needed.\par - CT chest 1/2017 emphysema and nodules small -reviewed , repeat CT chest 8/2017 - reviewed stable .will make apt with pulm \par - MARK at base line , rx for walker with seat given \par - handicap permit signed \par \par DM x 2 - for 2 yrs \par -Check A1c- 6.6 today  \par -recently increased metformin to 1000 BID \par - Ophthalmologist-consult reviewed no retinopathy 10/2018 \par - ASCVD risk - high change atorvastatin to 40 repeat LFT and Lipids 3 months \par - on ASA 81 \par \par b12 defeciency - b12 shot given today - 1000 mcg po daily advised  \par  \par Hypothyroidism - x 1 yrs - stable \par -on levothyroxine 100 mcg daily ,\par \par HTN- 4 ysr -elevated today - was rushing = check BP next visit 170/98 \par -on Irbesartan HCTZ 300 -12.5 and Amlodipine 5 mg daily \par \par Osteoporosis 6/2017 \par saw endo started Ibandronate 150 po monthly since > 1 yr now  -gets it on 7th day of the Month \par - 0n  Calcium 1200 mg daily \par -on  vitamin d 1000 units daily \par - do weight bearing exercises: walking with weights, stair climbing , weight training , jogging, aerobics etc \par -repeat test 2 yrs for a f/u\par \par HCM \par PAP- 3/2017 f/u advised \par colonoscope -4/2018 reviewed - polyp- repeat 5 yrs \par mammo- 9/2018  birad 3 due US 6 months reefrral given due 3/2019 pt to schedulea ppt \par Bone density - June 2017- osteoporosis followed by  endocrinologist on rx \par Prevnar 13 given 5/2017\par Pneumovax -given 6/2018 \par Hep C Negative\par flu vaccine received in pharmacy - 9/2018 \par shingles advised - pt to check insurance and make apt \par HIV screen - refused

## 2019-03-28 NOTE — PHYSICAL EXAM
[No Acute Distress] : no acute distress [Well Nourished] : well nourished [Well Developed] : well developed [No Respiratory Distress] : no respiratory distress  [Clear to Auscultation] : lungs were clear to auscultation bilaterally [No Accessory Muscle Use] : no accessory muscle use [Normal Rate] : normal rate  [Regular Rhythm] : with a regular rhythm [Normal S1, S2] : normal S1 and S2 [Soft] : abdomen soft [Non Tender] : non-tender [Normal Bowel Sounds] : normal bowel sounds [de-identified] : obese  [de-identified] : rom rt shoulder normal

## 2019-05-29 ENCOUNTER — RX RENEWAL (OUTPATIENT)
Age: 69
End: 2019-05-29

## 2019-05-29 ENCOUNTER — MEDICATION RENEWAL (OUTPATIENT)
Age: 69
End: 2019-05-29

## 2019-06-10 ENCOUNTER — RX RENEWAL (OUTPATIENT)
Age: 69
End: 2019-06-10

## 2019-07-17 ENCOUNTER — FORM ENCOUNTER (OUTPATIENT)
Age: 69
End: 2019-07-17

## 2019-07-17 ENCOUNTER — RX RENEWAL (OUTPATIENT)
Age: 69
End: 2019-07-17

## 2019-07-18 ENCOUNTER — OUTPATIENT (OUTPATIENT)
Dept: OUTPATIENT SERVICES | Facility: HOSPITAL | Age: 69
LOS: 1 days | End: 2019-07-18
Payer: MEDICARE

## 2019-07-18 ENCOUNTER — APPOINTMENT (OUTPATIENT)
Dept: INTERNAL MEDICINE | Facility: CLINIC | Age: 69
End: 2019-07-18
Payer: MEDICARE

## 2019-07-18 ENCOUNTER — APPOINTMENT (OUTPATIENT)
Dept: ULTRASOUND IMAGING | Facility: IMAGING CENTER | Age: 69
End: 2019-07-18
Payer: MEDICARE

## 2019-07-18 VITALS
OXYGEN SATURATION: 97 % | WEIGHT: 205 LBS | SYSTOLIC BLOOD PRESSURE: 110 MMHG | DIASTOLIC BLOOD PRESSURE: 60 MMHG | HEIGHT: 62 IN | BODY MASS INDEX: 37.73 KG/M2 | HEART RATE: 79 BPM | TEMPERATURE: 97.8 F

## 2019-07-18 DIAGNOSIS — N63.10 UNSPECIFIED LUMP IN THE RIGHT BREAST, UNSPECIFIED QUADRANT: ICD-10-CM

## 2019-07-18 PROCEDURE — 96372 THER/PROPH/DIAG INJ SC/IM: CPT

## 2019-07-18 PROCEDURE — 76642 ULTRASOUND BREAST LIMITED: CPT

## 2019-07-18 PROCEDURE — 76642 ULTRASOUND BREAST LIMITED: CPT | Mod: 26,RT

## 2019-07-18 PROCEDURE — G0439: CPT | Mod: 25

## 2019-07-18 PROCEDURE — G0444 DEPRESSION SCREEN ANNUAL: CPT | Mod: 59

## 2019-07-18 RX ORDER — CYANOCOBALAMIN 1000 UG/ML
1000 INJECTION INTRAMUSCULAR; SUBCUTANEOUS
Qty: 0 | Refills: 0 | Status: COMPLETED | OUTPATIENT
Start: 2019-07-18

## 2019-07-18 RX ADMIN — CYANOCOBALAMIN 0 MCG/ML: 1000 INJECTION INTRAMUSCULAR; SUBCUTANEOUS at 00:00

## 2019-07-18 NOTE — HISTORY OF PRESENT ILLNESS
[FreeTextEntry1] : came in for annual check up  [de-identified] : Lives in [place] \par sees Dr Trevon Berry - got referral from her \par  , 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 \par \par did US breast today - will be getting Mammo and us in 9/2019 \par \par left shoulder rotator cuff tear - 2017 - better ROM no pain \par -saw ortho was told she is too old for sx , advised physical therapy \par -was admitted 8/11/17 - left shoulder pain ACS was r/o had tap joint r/o septic arthritis , did MRi dx as left shoulder rotator cuff tear \par - saw ortho too 5 days percoset stopped - ortho did not recommend Pt , now better no pain - was advised not to do things above her shoulder , weights etc \par  \par DM - followed by ENDO - Dr Lopez left [facility] needs med renewed  , lost 20 lbs in last 6 months , saw Ophth 2018  mild cataracts no retinopathy or neuropathy \par - take medications daily, eat low carbohydrate diet, loose weight\par -Cr repeat was nl 6/2018 \par - saw ophtho 10/2018 no diabetic retinopathy \par  \par Hypothyroidism- TSH - stable on rx levothyroxine\par \par osteoporosis- did bone density 6/2017 followed by endo \par - now on  Ibandronate 150 po monthly since 8 months - needs new Endo \par -started vit d and calcium 1200 mg \par \par Small pericardial effusion - new onset 1/2017 when she was admitted for acute bronchitis and sob -saw Cardio DR Shankar Barnhart on [facility] - did echo , stress test - 1/2017 \par - saw cardio 2 weeks ago 6/13 BP was elevated he changed medication - he stopped losartan HCTZ \par -started on irbesartan HCTZ 300-12.5 , still on amlodipine 5 mg \par -he stopped atorvastatin \par \par Emphysema 1/2017 - smoked 2 PPD for 30 yrs , stopped 3 yrs ago when she had pneumonia , taking inhalers Spiriva and ProAir as needed \par -Ct chest 1/2017 emphysema and nodules small \par -Saw pulmonologist consult reviewed- Due for CT scan chest in July- will schedule apt  \par -Working up for obstructive sleep apnea \par \par HTN- 4 ysr \par - irbesartan hctz 300 -12.5 and Amlodipine 5 mg daily \par \par did colonoscope 2018 reviewed \par Mammo - 9/2018 bi rad 3 - rt breast nodule due rt breast us did today - due for annual 9/2019 \par PAP- 3/2017 \par \par

## 2019-07-18 NOTE — HEALTH RISK ASSESSMENT
[Good] : ~his/her~  mood as  good [30 or more] : 30 or more [No] : No [No falls in past year] : Patient reported no falls in the past year [0] : 2) Feeling down, depressed, or hopeless: Not at all (0) [No Retinopathy] : No retinopathy [Patient reported PAP Smear was normal] : Patient reported PAP Smear was normal [With Significant Other] : lives with significant other [] :  [Feels Safe at Home] : Feels safe at home [Fully functional (bathing, dressing, toileting, transferring, walking, feeding)] : Fully functional (bathing, dressing, toileting, transferring, walking, feeding) [Fully functional (using the telephone, shopping, preparing meals, housekeeping, doing laundry, using] : Fully functional and needs no help or supervision to perform IADLs (using the telephone, shopping, preparing meals, housekeeping, doing laundry, using transportation, managing medications and managing finances) [] : No [YearQuit] : 2014  [de-identified] : gardening , walking  [QAB2Dgeir] : 0 [EyeExamDate] : 10/2018  [Reports changes in hearing] : Reports no changes in hearing [Reports changes in vision] : Reports no changes in vision [Reports changes in dental health] : Reports no changes in dental health [PapSmearDate] : 2017

## 2019-07-18 NOTE — ASSESSMENT
[FreeTextEntry1] : \par LEFT ROTATOR CUFF TEAR-MRI shoulder reviewed  \par -better , saw ortho no pain now , rom ok , conservative rx \par \par Elevated liver enzymes, morbid obesity, BMI 37--resolved \par -Acute hepatitis panel negative\par -Discuss weight loss\par \par Small pericardial effusion - new onset 1/2017 when she was admitted for acute bronchitis and sob -[facility] - did echo , stress test - 1/2017 reviewed.\par -denies CP , + MARK at base line , or palpations, or dizzy spells \par -Report from the cardiologist requested\par \par Emphysema 1/2017 - smoked 2 PPD for 30 yrs , stopped 3 yrs ago when she had pneumonia , taking inhalers Spiriva and ProAir as needed.\par - CT chest 1/2017 emphysema and nodules small -reviewed , repeat CT chest 8/2017 - reviewed stable .will make apt with pulm \par - MARK at base line , rx for walker with seat given \par - handicap permit signed \par \par DM x 2 - for 2 yrs \par -get aic \par -recently increased metformin to 1000 BID \par - Ophthalmologist-consult reviewed no retinopathy 10/2018 \par - ASCVD risk - high change atorvastatin to 40 repeat LFT and Lipids 3 months \par - on ASA 81 \par \par b12 defeciency - b12 shot given today - 1000 mcg po daily advised  \par  \par Hypothyroidism - x 1 yrs - stable \par -on levothyroxine 100 mcg daily ,\par \par HTN- 4 ysr -elevated today - was rushing = check BP next visit 170/98 \par -on Irbesartan HCTZ 300 -12.5 and Amlodipine 5 mg daily \par \par Osteoporosis 6/2017 \par saw endo started Ibandronate 150 po monthly since > 1 yr now  -gets it on 7th day of the Month \par - 0n  Calcium 1200 mg daily \par -on  vitamin d 1000 units daily \par - do weight bearing exercises: walking with weights, stair climbing , weight training , jogging, aerobics etc \par -repeat test 2 yrs for a f/u\par \par HCM \par PAP- 3/2017 f/u advised \par colonoscope -4/2018 reviewed - polyp- repeat 5 yrs \par mammo- 9/2018  birad 3 due US 6 months did today - amm pt to schedulea ppt \par Bone density - June 2017- osteoporosis followed by  endocrinologist on rx \par Prevnar 13 given 5/2017\par Pneumovax -given 6/2018 \par Hep C Negative\par flu vaccine received in pharmacy - 9/2018 \par shingles advised - pt to check insurance and make apt \par HIV screen - refused \par skin check advised

## 2019-09-20 ENCOUNTER — RX RENEWAL (OUTPATIENT)
Age: 69
End: 2019-09-20

## 2019-10-17 ENCOUNTER — APPOINTMENT (OUTPATIENT)
Dept: INTERNAL MEDICINE | Facility: CLINIC | Age: 69
End: 2019-10-17
Payer: MEDICARE

## 2019-10-17 ENCOUNTER — LABORATORY RESULT (OUTPATIENT)
Age: 69
End: 2019-10-17

## 2019-10-17 VITALS
OXYGEN SATURATION: 95 % | DIASTOLIC BLOOD PRESSURE: 72 MMHG | BODY MASS INDEX: 39.2 KG/M2 | HEART RATE: 80 BPM | TEMPERATURE: 97.5 F | WEIGHT: 213 LBS | HEIGHT: 62 IN | SYSTOLIC BLOOD PRESSURE: 130 MMHG

## 2019-10-17 PROCEDURE — 36415 COLL VENOUS BLD VENIPUNCTURE: CPT

## 2019-10-17 PROCEDURE — 99214 OFFICE O/P EST MOD 30 MIN: CPT | Mod: 25

## 2019-10-17 PROCEDURE — 96372 THER/PROPH/DIAG INJ SC/IM: CPT

## 2019-10-17 RX ORDER — CYANOCOBALAMIN 1000 UG/ML
1000 INJECTION INTRAMUSCULAR; SUBCUTANEOUS
Qty: 0 | Refills: 0 | Status: COMPLETED | OUTPATIENT
Start: 2019-10-17

## 2019-10-17 RX ADMIN — CYANOCOBALAMIN 0 MCG/ML: 1000 INJECTION, SOLUTION INTRAMUSCULAR at 00:00

## 2019-10-17 NOTE — HISTORY OF PRESENT ILLNESS
[FreeTextEntry1] : f/u on  medical conditions  [de-identified] : Lives in [place] \par sees Dr Trevon Berry - got referral from her \par  , 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 \par \par didUS rt breast 7/2019 bi rad 2 now due Mammo and us in 9/2019 \par \par left shoulder rotator cuff tear - 2017 - better ROM no pain \par -saw ortho was told she is too old for sx , advised physical therapy \par -was admitted 8/11/17 - left shoulder pain ACS was r/o had tap joint r/o septic arthritis , did MRi dx as left shoulder rotator cuff tear \par - saw ortho too 5 days percoset stopped - ortho did not recommend Pt , now better no pain - was advised not to do things above her shoulder , weights etc \par  \par DM - followed by ENDO - Dr Lopez left [facility] needs med renewed ,saw Ophth 3/2019 reviewed  mild cataracts no retinopathy or neuropathy \par - take medications daily, eat low carbohydrate diet, loose weight\par - saw ophtho 3/2019  no diabetic retinopathy \par  \par Hypothyroidism- TSH - stable on rx levothyroxine\par \par osteoporosis- did bone density 6/2017 followed by endo \par - now on Ibandronate 150 po monthly - needs new Endo \par -started vit d and calcium 1200 mg \par \par Small pericardial effusion - new onset 1/2017 when she was admitted for acute bronchitis and sob -saw Cardio DR Shankar Barnhart on [facility] - did echo , stress test - 1/2017 \par - saw cardio 2 weeks ago 6/13 BP was elevated he changed medication - he stopped losartan HCTZ \par -started on irbesartan HCTZ 300-12.5 , still on amlodipine 5 mg \par -he stopped atorvastatin \par \par Emphysema 1/2017 - smoked 2 PPD for 30 yrs , stopped 3 yrs ago when she had pneumonia , taking inhalers Spiriva and ProAir as needed \par -Ct chest 1/2017 emphysema and nodules small \par -Saw pulmonologist consult reviewed- Due for CT scan chest in July- will schedule apt \par -Working up for obstructive sleep apnea \par \par HTN- 4 ysr \par - irbesartan hctz 300 -12.5 and Amlodipine 5 mg daily \par \par did colonoscope 2018 reviewed \par Mammo - 9/2018 bi rad 3 - rt breast nodule due rt breast us did 7/2019  - due for annual 9/2019 \par PAP- 3/2017 \par

## 2019-10-21 LAB
25(OH)D3 SERPL-MCNC: 27.4 NG/ML
ALBUMIN SERPL ELPH-MCNC: 4.6 G/DL
ALP BLD-CCNC: 101 U/L
ALT SERPL-CCNC: 19 U/L
ANION GAP SERPL CALC-SCNC: 19 MMOL/L
APPEARANCE: CLEAR
AST SERPL-CCNC: 20 U/L
BASOPHILS # BLD AUTO: 0.11 K/UL
BASOPHILS NFR BLD AUTO: 0.9 %
BILIRUB SERPL-MCNC: 0.3 MG/DL
BILIRUBIN URINE: NEGATIVE
BLOOD URINE: NEGATIVE
BUN SERPL-MCNC: 27 MG/DL
CALCIUM SERPL-MCNC: 9.7 MG/DL
CHLORIDE SERPL-SCNC: 104 MMOL/L
CHOLEST SERPL-MCNC: 180 MG/DL
CHOLEST/HDLC SERPL: 3.2 RATIO
CO2 SERPL-SCNC: 15 MMOL/L
COLOR: YELLOW
CREAT SERPL-MCNC: 1.04 MG/DL
EOSINOPHIL # BLD AUTO: 0.37 K/UL
EOSINOPHIL NFR BLD AUTO: 3.2 %
ESTIMATED AVERAGE GLUCOSE: 166 MG/DL
GLUCOSE QUALITATIVE U: NEGATIVE
GLUCOSE SERPL-MCNC: 130 MG/DL
HBA1C MFR BLD HPLC: 7.4 %
HCT VFR BLD CALC: 41 %
HDLC SERPL-MCNC: 57 MG/DL
HGB BLD-MCNC: 13.1 G/DL
IMM GRANULOCYTES NFR BLD AUTO: 0.3 %
KETONES URINE: NEGATIVE
LDLC SERPL CALC-MCNC: 86 MG/DL
LEUKOCYTE ESTERASE URINE: ABNORMAL
LYMPHOCYTES # BLD AUTO: 3.48 K/UL
LYMPHOCYTES NFR BLD AUTO: 29.9 %
MAN DIFF?: NORMAL
MCHC RBC-ENTMCNC: 30 PG
MCHC RBC-ENTMCNC: 32 GM/DL
MCV RBC AUTO: 93.8 FL
MONOCYTES # BLD AUTO: 1.02 K/UL
MONOCYTES NFR BLD AUTO: 8.8 %
NEUTROPHILS # BLD AUTO: 6.63 K/UL
NEUTROPHILS NFR BLD AUTO: 56.9 %
NITRITE URINE: NEGATIVE
PH URINE: 5.5
PLATELET # BLD AUTO: 242 K/UL
POTASSIUM SERPL-SCNC: 4.7 MMOL/L
PROT SERPL-MCNC: 7.4 G/DL
PROTEIN URINE: NORMAL
RBC # BLD: 4.37 M/UL
RBC # FLD: 14.3 %
SODIUM SERPL-SCNC: 138 MMOL/L
SPECIFIC GRAVITY URINE: 1.02
TRIGL SERPL-MCNC: 186 MG/DL
TSH SERPL-ACNC: 3.46 UIU/ML
UROBILINOGEN URINE: NORMAL
VIT B12 SERPL-MCNC: >2000 PG/ML
WBC # FLD AUTO: 11.65 K/UL

## 2019-12-06 ENCOUNTER — RX RENEWAL (OUTPATIENT)
Age: 69
End: 2019-12-06

## 2020-01-08 ENCOUNTER — FORM ENCOUNTER (OUTPATIENT)
Age: 70
End: 2020-01-08

## 2020-01-09 ENCOUNTER — OUTPATIENT (OUTPATIENT)
Dept: OUTPATIENT SERVICES | Facility: HOSPITAL | Age: 70
LOS: 1 days | End: 2020-01-09
Payer: COMMERCIAL

## 2020-01-09 ENCOUNTER — APPOINTMENT (OUTPATIENT)
Dept: RADIOLOGY | Facility: IMAGING CENTER | Age: 70
End: 2020-01-09
Payer: MEDICARE

## 2020-01-09 ENCOUNTER — APPOINTMENT (OUTPATIENT)
Dept: INTERNAL MEDICINE | Facility: CLINIC | Age: 70
End: 2020-01-09
Payer: MEDICARE

## 2020-01-09 ENCOUNTER — APPOINTMENT (OUTPATIENT)
Dept: MAMMOGRAPHY | Facility: IMAGING CENTER | Age: 70
End: 2020-01-09
Payer: MEDICARE

## 2020-01-09 VITALS
HEIGHT: 62 IN | OXYGEN SATURATION: 97 % | BODY MASS INDEX: 38.64 KG/M2 | HEART RATE: 98 BPM | DIASTOLIC BLOOD PRESSURE: 74 MMHG | TEMPERATURE: 98.5 F | WEIGHT: 210 LBS | SYSTOLIC BLOOD PRESSURE: 156 MMHG

## 2020-01-09 DIAGNOSIS — M81.0 AGE-RELATED OSTEOPOROSIS WITHOUT CURRENT PATHOLOGICAL FRACTURE: ICD-10-CM

## 2020-01-09 DIAGNOSIS — Z12.39 ENCOUNTER FOR OTHER SCREENING FOR MALIGNANT NEOPLASM OF BREAST: ICD-10-CM

## 2020-01-09 PROCEDURE — 77080 DXA BONE DENSITY AXIAL: CPT | Mod: 26

## 2020-01-09 PROCEDURE — 77067 SCR MAMMO BI INCL CAD: CPT

## 2020-01-09 PROCEDURE — 36415 COLL VENOUS BLD VENIPUNCTURE: CPT

## 2020-01-09 PROCEDURE — 96372 THER/PROPH/DIAG INJ SC/IM: CPT

## 2020-01-09 PROCEDURE — 77080 DXA BONE DENSITY AXIAL: CPT

## 2020-01-09 PROCEDURE — 99214 OFFICE O/P EST MOD 30 MIN: CPT | Mod: 25

## 2020-01-09 PROCEDURE — 77067 SCR MAMMO BI INCL CAD: CPT | Mod: 26

## 2020-01-09 PROCEDURE — 77063 BREAST TOMOSYNTHESIS BI: CPT | Mod: 26

## 2020-01-09 PROCEDURE — 77063 BREAST TOMOSYNTHESIS BI: CPT

## 2020-01-09 RX ORDER — CYANOCOBALAMIN 1000 UG/ML
1000 INJECTION INTRAMUSCULAR; SUBCUTANEOUS
Qty: 0 | Refills: 0 | Status: COMPLETED | OUTPATIENT
Start: 2020-01-09

## 2020-01-09 RX ADMIN — CYANOCOBALAMIN 0 MCG/ML: 1000 INJECTION, SOLUTION INTRAMUSCULAR at 00:00

## 2020-01-09 NOTE — HISTORY OF PRESENT ILLNESS
[de-identified] : Lives in [place] \par sees Dr Trevon Berry - got referral from her \par  , 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 \par \par did US rt breast 7/2019 bi rad 2 now due Mammo and us in 9/2019 - has appt for mammo today and dexa scan \par \par left shoulder rotator cuff tear - 2017 - better ROM no pain \par -saw ortho was told she is too old for sx , advised physical therapy \par -was admitted 8/11/17 - left shoulder pain ACS was r/o had tap joint r/o septic arthritis , did MRi dx as left shoulder rotator cuff tear \par - saw ortho too 5 days percoset stopped - ortho did not recommend Pt , now better no pain - was advised not to do things above her shoulder , weights etc \par  \par DM - followed by ENDO - Dr Lopez left [facility] needs med renewed ,saw Ophth 3/2019 reviewed mild cataracts no retinopathy or neuropathy \par - take medications daily, eat low carbohydrate diet, loose weight\par - saw ophtho 3/2019 no diabetic retinopathy \par  \par Hypothyroidism- TSH - stable on rx levothyroxine\par \par osteoporosis- did bone density 6/2017 followed by endo \par - now on Ibandronate 150 po monthly - needs new Endo \par -started vit d and calcium 1200 mg \par \par Small pericardial effusion - new onset 1/2017 when she was admitted for acute bronchitis and sob -saw Cardio DR Shankar Barnhart on [facility] - did echo , stress test - 1/2017 \par - saw cardio 2 weeks ago 6/13 BP was elevated he changed medication - he stopped losartan HCTZ \par -started on irbesartan HCTZ 300-12.5 , still on amlodipine 5 mg \par -he stopped atorvastatin \par \par Emphysema 1/2017 - smoked 2 PPD for 30 yrs , stopped 3 yrs ago when she had pneumonia , taking inhalers Spiriva and ProAir as needed \par -Ct chest 1/2017 emphysema and nodules small \par -Saw pulmonologist consult reviewed- Due for CT scan chest in July- will schedule apt \par -Working up for obstructive sleep apnea \par \par HTN- 4 ysr \par - irbesartan hctz 300 -12.5 and Amlodipine 5 mg daily \par \par did colonoscope 2018 reviewed \par Mammo - 9/2018 bi rad 3 - rt breast nodule due rt breast us did 7/2019 - due for annual 9/2019 \par PAP- 3/2017 \par

## 2020-01-09 NOTE — ASSESSMENT
[FreeTextEntry1] : \par Elevated liver enzymes, morbid obesity, BMI 37--resolved \par -Acute hepatitis panel negative\par -Discuss weight loss\par \par Small pericardial effusion - new onset 1/2017 when she was admitted for acute bronchitis and sob -saw Cardio DR Chaves on [facility] - did echo , stress test - 1/2017 reviewed.\par -denies CP , + MARK at base line , or palpations, or dizzy spells \par -Report from the cardiologist requested\par \par Emphysema 1/2017 - smoked 2 PPD for 30 yrs , stopped 3 yrs ago when she had pneumonia , taking inhalers Spiriva and ProAir as needed.\par - CT chest 1/2017 emphysema and nodules small -reviewed , repeat CT chest 8/2017 - reviewed stable.will make apt with pulm \par - MARK at base line , rx for walker with seat given \par - handicap permit signed \par -referral to see pulm given \par \par DM x 2 - for 2 yrs \par -get aic \par -recently increased metformin to 1000 BID \par - Ophthalmologist-consult reviewed no retinopathy 10/2018 \par - ASCVD risk - high change atorvastatin to 40 repeat LFT and Lipids 3 months \par - on ASA 81 \par \par b12 deficiency - b12 shot given today - 1000 mcg po daily advised \par  \par Hypothyroidism - x 1 yrs - stable \par -on levothyroxine 100 mcg daily ,\par \par HTN- 4 ysr -better now 142/78 \par -on Irbesartan HCTZ 300 -12.5 and Amlodipine 5 mg daily \par \par Osteoporosis 6/2017 \par saw endo started Ibandronate 150 po monthly since > 1 yr now -gets it on 7th day of the Month \par - 0n Calcium 1200 mg daily \par -on vitamin d 1000 units daily \par - do weight bearing exercises: walking with weights, stair climbing , weight training , jogging, aerobics etc \par -repeat test 2 yrs for a f/u- rx given today has appt \par \par HCM \par PAP- 3/2017 f/u advised \par colonoscope -4/2018 reviewed - polyp- repeat 5 yrs \par mammo- 9/2018 birad 3 due US 6 months did 7/2019 bi rad 2 rx given \par Bone density - June 2017- osteoporosis followed by endocrinologist on rx ordered \par Prevnar 13 given 5/2017\par Pneumovax -given 6/2018 \par Hep C Negative\par flu vaccine received in pharmacy - 8/29/19\par shingles advised - pt to check insurance and make apt \par HIV screen - refused \par skin check advised. \par

## 2020-01-10 LAB
BASOPHILS # BLD AUTO: 0.08 K/UL
BASOPHILS NFR BLD AUTO: 0.8 %
EOSINOPHIL # BLD AUTO: 0.2 K/UL
EOSINOPHIL NFR BLD AUTO: 2.1 %
ESTIMATED AVERAGE GLUCOSE: 163 MG/DL
HBA1C MFR BLD HPLC: 7.3 %
HCT VFR BLD CALC: 42.1 %
HGB BLD-MCNC: 13.4 G/DL
IMM GRANULOCYTES NFR BLD AUTO: 0.1 %
LYMPHOCYTES # BLD AUTO: 2.74 K/UL
LYMPHOCYTES NFR BLD AUTO: 28.7 %
MAN DIFF?: NORMAL
MCHC RBC-ENTMCNC: 29.8 PG
MCHC RBC-ENTMCNC: 31.8 GM/DL
MCV RBC AUTO: 93.6 FL
MONOCYTES # BLD AUTO: 1.07 K/UL
MONOCYTES NFR BLD AUTO: 11.2 %
NEUTROPHILS # BLD AUTO: 5.46 K/UL
NEUTROPHILS NFR BLD AUTO: 57.1 %
PLATELET # BLD AUTO: 207 K/UL
RBC # BLD: 4.5 M/UL
RBC # FLD: 14.3 %
WBC # FLD AUTO: 9.56 K/UL

## 2020-01-15 ENCOUNTER — FORM ENCOUNTER (OUTPATIENT)
Age: 70
End: 2020-01-15

## 2020-01-16 ENCOUNTER — OUTPATIENT (OUTPATIENT)
Dept: OUTPATIENT SERVICES | Facility: HOSPITAL | Age: 70
LOS: 1 days | End: 2020-01-16
Payer: SELF-PAY

## 2020-01-16 ENCOUNTER — APPOINTMENT (OUTPATIENT)
Dept: ULTRASOUND IMAGING | Facility: IMAGING CENTER | Age: 70
End: 2020-01-16
Payer: MEDICARE

## 2020-01-16 DIAGNOSIS — N63.10 UNSPECIFIED LUMP IN THE RIGHT BREAST, UNSPECIFIED QUADRANT: ICD-10-CM

## 2020-01-16 PROCEDURE — 76641 ULTRASOUND BREAST COMPLETE: CPT | Mod: 26,50

## 2020-01-16 PROCEDURE — 76641 ULTRASOUND BREAST COMPLETE: CPT

## 2020-01-22 ENCOUNTER — FORM ENCOUNTER (OUTPATIENT)
Age: 70
End: 2020-01-22

## 2020-01-23 ENCOUNTER — OUTPATIENT (OUTPATIENT)
Dept: OUTPATIENT SERVICES | Facility: HOSPITAL | Age: 70
LOS: 1 days | End: 2020-01-23
Payer: COMMERCIAL

## 2020-01-23 ENCOUNTER — RESULT REVIEW (OUTPATIENT)
Age: 70
End: 2020-01-23

## 2020-01-23 ENCOUNTER — APPOINTMENT (OUTPATIENT)
Dept: MAMMOGRAPHY | Facility: IMAGING CENTER | Age: 70
End: 2020-01-23
Payer: MEDICARE

## 2020-01-23 DIAGNOSIS — Z00.8 ENCOUNTER FOR OTHER GENERAL EXAMINATION: ICD-10-CM

## 2020-01-23 PROCEDURE — 77065 DX MAMMO INCL CAD UNI: CPT

## 2020-01-23 PROCEDURE — A4648: CPT

## 2020-01-23 PROCEDURE — 19081 BX BREAST 1ST LESION STRTCTC: CPT

## 2020-01-23 PROCEDURE — 88305 TISSUE EXAM BY PATHOLOGIST: CPT | Mod: 26

## 2020-01-23 PROCEDURE — 77065 DX MAMMO INCL CAD UNI: CPT | Mod: 26,RT

## 2020-01-23 PROCEDURE — 88305 TISSUE EXAM BY PATHOLOGIST: CPT

## 2020-01-23 PROCEDURE — 19081 BX BREAST 1ST LESION STRTCTC: CPT | Mod: RT

## 2020-01-27 LAB — SURGICAL PATHOLOGY STUDY: SIGNIFICANT CHANGE UP

## 2020-03-05 ENCOUNTER — RX RENEWAL (OUTPATIENT)
Age: 70
End: 2020-03-05

## 2020-05-14 ENCOUNTER — RX RENEWAL (OUTPATIENT)
Age: 70
End: 2020-05-14

## 2020-09-17 ENCOUNTER — RX RENEWAL (OUTPATIENT)
Age: 70
End: 2020-09-17

## 2020-09-29 ENCOUNTER — LABORATORY RESULT (OUTPATIENT)
Age: 70
End: 2020-09-29

## 2020-09-29 ENCOUNTER — APPOINTMENT (OUTPATIENT)
Dept: INTERNAL MEDICINE | Facility: CLINIC | Age: 70
End: 2020-09-29
Payer: MEDICARE

## 2020-09-29 VITALS
WEIGHT: 182 LBS | DIASTOLIC BLOOD PRESSURE: 84 MMHG | HEART RATE: 95 BPM | BODY MASS INDEX: 33.49 KG/M2 | SYSTOLIC BLOOD PRESSURE: 146 MMHG | HEIGHT: 62 IN | OXYGEN SATURATION: 98 % | TEMPERATURE: 97.6 F

## 2020-09-29 PROCEDURE — 36415 COLL VENOUS BLD VENIPUNCTURE: CPT

## 2020-09-29 PROCEDURE — 99213 OFFICE O/P EST LOW 20 MIN: CPT | Mod: 25

## 2020-09-29 PROCEDURE — 90715 TDAP VACCINE 7 YRS/> IM: CPT

## 2020-09-29 PROCEDURE — G0444 DEPRESSION SCREEN ANNUAL: CPT | Mod: 59

## 2020-09-29 PROCEDURE — G0439: CPT

## 2020-09-29 PROCEDURE — G0442 ANNUAL ALCOHOL SCREEN 15 MIN: CPT | Mod: 59

## 2020-09-29 PROCEDURE — 90471 IMMUNIZATION ADMIN: CPT

## 2020-09-29 RX ORDER — IBANDRONATE SODIUM 150 MG/1
150 TABLET ORAL
Qty: 3 | Refills: 3 | Status: DISCONTINUED | COMMUNITY
Start: 2017-10-30 | End: 2020-09-29

## 2020-09-29 RX ORDER — LOSARTAN POTASSIUM AND HYDROCHLOROTHIAZIDE 25; 100 MG/1; MG/1
100-25 TABLET ORAL DAILY
Qty: 90 | Refills: 1 | Status: DISCONTINUED | COMMUNITY
Start: 2017-07-31 | End: 2020-09-29

## 2020-09-29 NOTE — ASSESSMENT
[FreeTextEntry1] : IBS / diarrhea could be due to Metformin vs lactose intolerance vs IBS \par - discontinue metformin - start Glipizide 10 ER qd \par - to see GASTRO for evaluation \par - cont probiotics \par \par Elevated liver enzymes, morbid obesity, BMI 33--resolved \par -Acute hepatitis panel negative\par -Discuss weight loss\par \par Small pericardial effusion - new onset 1/2017 when she was admitted for acute bronchitis and sob -saw Cardio DR Chaves on [facility] - did echo , stress test - 1/2017 reviewed.\par -denies CP , + MARK at base line , or palpations, or dizzy spells \par -Report from the cardiologist requested\par \par Emphysema 1/2017 - smoked 2 PPD for 30 yrs , stopped 3 yrs ago when she had pneumonia , taking inhalers Spiriva and ProAir as needed.\par - CT chest 1/2017 emphysema and nodules small -reviewed , repeat CT chest 8/2017 - reviewed stable.will make apt with pulm \par - MARK at base line , rx for walker with seat given \par - handicap permit signed \par -referral to see pulm given \par \par DM x 2 - for 2 yrs \par -get aic \par -D/c metformin 1000 BID ( diarrhea) start Glipizide ER 10 qD - CALL IN 2 WEEKS IF fbs > 150 \par - Ophthalmologist-consult reviewed no retinopathy 2019 - F/U ADVICED \par - ASCVD risk - high ON  atorvastatin to 40 repeat LFT and Lipids 3 months \par - on ASA 81 \par \par b12 deficiency - GET LEVELS  - 1000 mcg po daily advised \par  \par Hypothyroidism - x 1 yrs - stable \par -on levothyroxine 100 mcg daily ,\par \par HTN- 4 ysr -better now 142/78 \par -on Irbesartan HCTZ 300 -12.5 and Amlodipine 5 mg daily \par \par Osteoporosis 6/2017 dEXA - 1/2020 IMPROVING \par saw endo started Ibandronate 150 po monthly since > 1 yr now -gets it on 7th day of the Month \par - 0n Calcium 1200 mg daily \par -on vitamin d 1000 units daily \par - do weight bearing exercises: walking with weights, stair climbing , weight training , jogging, aerobics etc \par -repeat test 2 yrs for a f/u-\par \par HCM \par PAP- 3/2017 f/u advised \par colonoscope -4/2018 reviewed - polyp- repeat 5 yrs \par mammo- 9/2018 birad 3 due US 6 months did 7/2019 bi rad 2 rx given \par Bone density -1/2020 - osteoporosis improving ,followed by endocrinologist on rx ordered \par Prevnar 13 given 5/2017\par Pneumovax -given 6/2018 \par tDAP 9/29/2020\par Hep C Negative\par flu vaccine received in pharmacy -9/1/2020 at [facility] \par shingles advised - pt to check insurance and make apt \par HIV screen - refused \par skin check advised. \par

## 2020-09-29 NOTE — HEALTH RISK ASSESSMENT
[Good] : ~his/her~  mood as  good [No] : No [No falls in past year] : Patient reported no falls in the past year [0] : 2) Feeling down, depressed, or hopeless: Not at all (0) [With Significant Other] : lives with significant other [Retired] : retired [] :  [] : No [de-identified] : walking  [[id number]] : 0 [Reports changes in hearing] : Reports no changes in hearing [Reports changes in vision] : Reports no changes in vision [Reports changes in dental health] : Reports no changes in dental health [FreeTextEntry2] : part time 24 hr [employer]

## 2020-09-29 NOTE — HISTORY OF PRESENT ILLNESS
[Other: _____] : [unfilled] [de-identified] : CPE \par \par Lives in [place] \par sees Dr Trevon Berry - got referral from her \par  , 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 \par \par diarrhea- Irritable - 3-4 time a day soft to mixed , weres diapers - used probiotic initially worked not now \par -ass with bloating and cramping 2 min warning - at times with milk products \par - lost weight 30 LBS + \par \par did MAmmo 1/2020 \par \par left shoulder rotator cuff tear - 2017 - better ROM no pain \par -saw ortho was told she is too old for sx , advised physical therapy \par -was admitted 8/11/17 - left shoulder pain ACS was r/o had tap joint r/o septic arthritis , did MRi dx as left shoulder rotator cuff tear \par - saw ortho too 5 days percoset stopped - ortho did not recommend Pt , now better no pain - was advised not to do things above her shoulder , weights etc \par  \par DM - followed by ENDO - Dr Lopez left [facility] needs med renewed ,saw Ophth 3/2019 reviewed mild cataracts no retinopathy or neuropathy \par - good readings \par - take medications daily, eat low carbohydrate diet, loose weight\par - saw ophtho 3/2019 no diabetic retinopathy \par  \par Hypothyroidism- TSH - stable on rx levothyroxine\par \par osteoporosis- did bone density 6/2017 followed by endo \par - now on Ibandronate 150 po monthly - needs new Endo \par -started vit d and calcium 1200 mg \par \par Small pericardial effusion - new onset 1/2017 when she was admitted for acute bronchitis and sob -saw Cardio DR Shankar Barnhart on [facility] - did echo , stress test - 1/2017 \par - saw cardio 2 weeks ago 6/13 BP was elevated he changed medication - he stopped losartan HCTZ \par -started on irbesartan HCTZ 300-12.5 , still on amlodipine 5 mg \par -he stopped atorvastatin \par \par Emphysema 1/2017 - smoked 2 PPD for 30 yrs , stopped 3 yrs ago when she had pneumonia , taking inhalers Spiriva and ProAir as needed \par -Ct chest 1/2017 emphysema and nodules small \par -Saw pulmonologist consult reviewed- Due for CT scan chest in July- will schedule apt \par -Working up for obstructive sleep apnea \par \par HTN- 4 ysr \par - irbesartan hctz 300 -12.5 and Amlodipine 5 mg daily \par \par did colonoscope 2018 reviewed \par Mammo - 9/2018 bi rad 3 - rt breast nodule due rt breast us did 7/2019 -  1/2020 \par PAP- 3/2017 \par  \par

## 2020-09-30 LAB
25(OH)D3 SERPL-MCNC: 37.5 NG/ML
ALBUMIN SERPL ELPH-MCNC: 4.7 G/DL
ALP BLD-CCNC: 88 U/L
ALT SERPL-CCNC: 21 U/L
ANION GAP SERPL CALC-SCNC: 16 MMOL/L
APPEARANCE: CLEAR
AST SERPL-CCNC: 22 U/L
BASOPHILS # BLD AUTO: 0.1 K/UL
BASOPHILS NFR BLD AUTO: 0.9 %
BILIRUB SERPL-MCNC: 0.3 MG/DL
BILIRUBIN URINE: NEGATIVE
BLOOD URINE: NEGATIVE
BUN SERPL-MCNC: 33 MG/DL
CALCIUM SERPL-MCNC: 9.8 MG/DL
CHLORIDE SERPL-SCNC: 103 MMOL/L
CHOLEST SERPL-MCNC: 193 MG/DL
CHOLEST/HDLC SERPL: 3.3 RATIO
CO2 SERPL-SCNC: 21 MMOL/L
COLOR: YELLOW
CREAT SERPL-MCNC: 1.52 MG/DL
EOSINOPHIL # BLD AUTO: 0.25 K/UL
EOSINOPHIL NFR BLD AUTO: 2.3 %
ESTIMATED AVERAGE GLUCOSE: 146 MG/DL
GLUCOSE QUALITATIVE U: NEGATIVE
GLUCOSE SERPL-MCNC: 93 MG/DL
HBA1C MFR BLD HPLC: 6.7 %
HCT VFR BLD CALC: 38.2 %
HDLC SERPL-MCNC: 58 MG/DL
HGB BLD-MCNC: 12 G/DL
IMM GRANULOCYTES NFR BLD AUTO: 0.3 %
KETONES URINE: NEGATIVE
LDLC SERPL CALC-MCNC: 84 MG/DL
LEUKOCYTE ESTERASE URINE: ABNORMAL
LYMPHOCYTES # BLD AUTO: 4.01 K/UL
LYMPHOCYTES NFR BLD AUTO: 37.4 %
MAN DIFF?: NORMAL
MCHC RBC-ENTMCNC: 30.3 PG
MCHC RBC-ENTMCNC: 31.4 GM/DL
MCV RBC AUTO: 96.5 FL
MONOCYTES # BLD AUTO: 1.03 K/UL
MONOCYTES NFR BLD AUTO: 9.6 %
NEUTROPHILS # BLD AUTO: 5.29 K/UL
NEUTROPHILS NFR BLD AUTO: 49.5 %
NITRITE URINE: NEGATIVE
PH URINE: 5.5
PLATELET # BLD AUTO: 192 K/UL
POTASSIUM SERPL-SCNC: 4.7 MMOL/L
PROT SERPL-MCNC: 7.2 G/DL
PROTEIN URINE: NORMAL
RBC # BLD: 3.96 M/UL
RBC # FLD: 15.5 %
SODIUM SERPL-SCNC: 140 MMOL/L
SPECIFIC GRAVITY URINE: 1.02
TRIGL SERPL-MCNC: 258 MG/DL
TSH SERPL-ACNC: 4.31 UIU/ML
UROBILINOGEN URINE: NORMAL
VIT B12 SERPL-MCNC: 717 PG/ML
WBC # FLD AUTO: 10.71 K/UL

## 2020-10-30 ENCOUNTER — APPOINTMENT (OUTPATIENT)
Dept: GASTROENTEROLOGY | Facility: CLINIC | Age: 70
End: 2020-10-30
Payer: MEDICARE

## 2020-10-30 ENCOUNTER — TRANSCRIPTION ENCOUNTER (OUTPATIENT)
Age: 70
End: 2020-10-30

## 2020-10-30 VITALS
BODY MASS INDEX: 35.51 KG/M2 | HEART RATE: 77 BPM | SYSTOLIC BLOOD PRESSURE: 151 MMHG | DIASTOLIC BLOOD PRESSURE: 85 MMHG | TEMPERATURE: 96.9 F | WEIGHT: 193 LBS | HEIGHT: 62 IN

## 2020-10-30 DIAGNOSIS — K64.9 UNSPECIFIED HEMORRHOIDS: ICD-10-CM

## 2020-10-30 PROCEDURE — 36415 COLL VENOUS BLD VENIPUNCTURE: CPT

## 2020-10-30 PROCEDURE — 99214 OFFICE O/P EST MOD 30 MIN: CPT | Mod: 25

## 2020-10-30 PROCEDURE — 82274 ASSAY TEST FOR BLOOD FECAL: CPT | Mod: QW

## 2020-10-30 PROCEDURE — 99072 ADDL SUPL MATRL&STAF TM PHE: CPT

## 2020-10-30 RX ORDER — ASPIRIN ENTERIC COATED TABLETS 81 MG 81 MG/1
81 TABLET, DELAYED RELEASE ORAL DAILY
Qty: 90 | Refills: 2 | Status: DISCONTINUED | COMMUNITY
Start: 2019-01-10 | End: 2020-10-30

## 2020-10-30 RX ORDER — SULFAMETHOXAZOLE AND TRIMETHOPRIM 800; 160 MG/1; MG/1
800-160 TABLET ORAL
Qty: 10 | Refills: 0 | Status: DISCONTINUED | COMMUNITY
Start: 2020-09-30 | End: 2020-10-30

## 2020-10-30 NOTE — HISTORY OF PRESENT ILLNESS
[FreeTextEntry1] : Juanita has been having bowel issues for years, often with explosive bowel movements.  She has typically been averaging 3-4 soft or loose stools daily. She also reports some cramps and urgency, needing to wear diapers.  Symptoms are worse after ingesting dairy.  Symptoms significantly improved after switching Metformin to alternate agent, but she continues to note need to defecate within 15 minutes after a meal.  Symptoms rarely awaken her from a sound sleep.  Colonoscopy April 2018 revealed hyperplastic rectal polyp and lipoma, negative for colitis.  She also reports urinary frequency.  Symptoms may have started shortly after gallbladder surgery in 1986.

## 2020-10-30 NOTE — ASSESSMENT
[FreeTextEntry1] : 1.  Chronic bowel issues, including diarrhea, bloating, urgency, soiling, found to have heme positive stool on today's exam; hyperplastic polyp, lipoma, hemorrhoids at colonoscopy April 2018--rule out celiac disease and bacterial overgrowth (predisposed because of DM and thyroid disease).  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  History of elevated liver chemistries, statistically likely to have NAFLD.\par 3.  Obesity.\par 4.  Type 2 diabetes mellitus.\par 5.  Hypothyroidism.\par 6.  COPD; ex-smoker.\par 7.  Hyperlipidemia.\par 8.  Chronic renal insufficiency stage III.\par 9.  Hypertension.\par 10.  Osteoporosis.\par \par Plan:\par 1.  Medical records reviewed.\par 2.  Bloodwork drawn by me this morning, including celiac panel.\par 3.  Pending results of above, probable EGD with small bowel biopsies to follow-- Procedure, rationale, alternatives, material risks, and anesthesia plan were reviewed and brochure given.  To hold diabetes medicine on the morning of the test.\par 4.  Can take Imodium prophylactically for now.\par 5.  Other recommendations, such as consideration of cholestyramine, to follow.\par

## 2020-10-30 NOTE — CONSULT LETTER
[Dear  ___] : Dear  [unfilled], [Courtesy Letter:] : I had the pleasure of seeing your patient, [unfilled], in my office today. [Please see my note below.] : Please see my note below. [Consult Closing:] : Thank you very much for allowing me to participate in the care of this patient.  If you have any questions, please do not hesitate to contact me. [Sincerely,] : Sincerely, [FreeTextEntry3] : Lit Washington M.D.\par

## 2020-10-30 NOTE — PHYSICAL EXAM
[General Appearance - Alert] : alert [General Appearance - In No Acute Distress] : in no acute distress [General Appearance - Well Nourished] : well nourished [General Appearance - Well Developed] : well developed [Sclera] : the sclera and conjunctiva were normal [Neck Appearance] : the appearance of the neck was normal [Neck Cervical Mass (___cm)] : no neck mass was observed [Jugular Venous Distention Increased] : there was no jugular-venous distention [Thyroid Diffuse Enlargement] : the thyroid was not enlarged [Thyroid Nodule] : there were no palpable thyroid nodules [Auscultation Breath Sounds / Voice Sounds] : lungs were clear to auscultation bilaterally [Heart Rate And Rhythm] : heart rate was normal and rhythm regular [Heart Sounds] : normal S1 and S2 [Heart Sounds Gallop] : no gallops [Murmurs] : no murmurs [Heart Sounds Pericardial Friction Rub] : no pericardial rub [Full Pulse] : the pedal pulses are present [Edema] : there was no peripheral edema [Bowel Sounds] : normal bowel sounds [Abdomen Soft] : soft [Abdomen Tenderness] : non-tender [Abdomen Mass (___ Cm)] : no abdominal mass palpated [Abdomen Hernia] : no hernia was discovered [Normal Sphincter Tone] : normal sphincter tone [No Rectal Mass] : no rectal mass [Internal Hemorrhoid] : internal hemorrhoids [External Hemorrhoid] : external hemorrhoids [Occult Blood Positive] : stool positive for occult blood [FreeTextEntry1] : FIT negative [Cervical Lymph Nodes Enlarged Posterior Bilaterally] : posterior cervical [Cervical Lymph Nodes Enlarged Anterior Bilaterally] : anterior cervical [Supraclavicular Lymph Nodes Enlarged Bilaterally] : supraclavicular [Inguinal Lymph Nodes Enlarged Bilaterally] : inguinal [Abnormal Walk] : normal gait [Nail Clubbing] : no clubbing  or cyanosis of the fingernails [Musculoskeletal - Swelling] : no joint swelling seen [Skin Color & Pigmentation] : normal skin color and pigmentation [Skin Turgor] : normal skin turgor [] : no rash [Oriented To Time, Place, And Person] : oriented to person, place, and time [Impaired Insight] : insight and judgment were intact [Affect] : the affect was normal

## 2020-11-02 LAB
ENDOMYSIUM IGA SER QL: NEGATIVE
ENDOMYSIUM IGA TITR SER: NORMAL
FOLATE SERPL-MCNC: 19.8 NG/ML
GLIADIN IGA SER QL: <5 UNITS
GLIADIN IGG SER QL: <5 UNITS
GLIADIN PEPTIDE IGA SER-ACNC: NEGATIVE
GLIADIN PEPTIDE IGG SER-ACNC: NEGATIVE
IGA SER QL IEP: 282 MG/DL
TTG IGA SER IA-ACNC: <1.2 U/ML
TTG IGA SER-ACNC: NEGATIVE
TTG IGG SER IA-ACNC: 11.9 U/ML
TTG IGG SER IA-ACNC: POSITIVE
VIT B12 SERPL-MCNC: 795 PG/ML

## 2020-11-03 NOTE — ED ADULT NURSE NOTE - NS ED NOTE ABUSE RESPONSE YN
11/4/2020 1:33 PM 
 
Mr. 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 We discovered sucrase deficiency on recent upper endoscopy and colonoscopy evaluation of chronic vomiting and failure to thrive. The disaccharidase activity levels: Francisco J's vomiting is mildly improved on chronic acid suppression medications lansoprazole and famotidine. There was a trial of Periactin for management of GERD, however without benefit. We applied for Sucraid enzyme therapy with his recent results and he has been on Sucraid for over two months now. Francisco J returned to clinic on November 3, 2020 in clinical follow-up since starting Sucraid for sucrose intolerance manifesting inadequate weight gain and chronic vomiting/GERD. Mother and I discussed Francisco J's progress on Sucraid. The reflux and vomiting has been much reduced with Sucraid use. There has been a 5 pound weight gain since starting this enzyme supplement 2 months ago. He is eating more and seems to have increased energy. Accordingly, Francisco J has been able to participate more fully in physical and occupational therapy. There has been increased exercise endurance to stand and walk for longer periods of time. There is increased physical activity and attention, and mother can feel Francisco J becoming more muscular and improved muscle tone. Mother has reported to me that with Sucraid use, Moisess hair and fingernails began to grow at a normal rate for the first time in his life. She just recently trimmed his fingernails for the first time! Hair quality has grown healthier as well. There is only mild reflux and occasional vomiting, leading us to discuss expanded food and environmental allergy testing at Dr. Janae Rojas office. When off Prevacid and Pepcid, Francisco J regurgitates more noticeably. On the medicines, it is not clear that his eating is much improved and he was still having projectile vomiting on a daily basis. In the past, Periactin was trialed at 3 mg dosing, however with unclear benefits and so was stopped. A variety of other acid suppression medicines have been trialed, unfortunately without improved efficacy. Overall, Francisco J has experienced great improvement in nutrition balance and development with Sucraid therapy of sucrose intolerance. I would recommend we continue this therapy, with the weight gain and other physical signs of improved nutrition/development as evidence of Sucraid's benefits for Francisco J. Thank you for your consideration of coverage of Sucraid therapy for Francisco J. This little boy is thriving finally and the family is so grateful for Sucraid and the recent progress it has brought their son. If you have questions, please have the family notify me so that I can help clarify the issue. Thank you for your consideration in this matter. Sincerely, Nathalie Alex MD 
 
 
 Yes

## 2020-11-10 LAB — TRYPSINOGEN SERPL-MCNC: 779 NG/ML

## 2020-11-18 ENCOUNTER — APPOINTMENT (OUTPATIENT)
Dept: DISASTER EMERGENCY | Facility: CLINIC | Age: 70
End: 2020-11-18

## 2020-11-19 LAB — SARS-COV-2 N GENE NPH QL NAA+PROBE: NOT DETECTED

## 2020-11-23 ENCOUNTER — LABORATORY RESULT (OUTPATIENT)
Age: 70
End: 2020-11-23

## 2020-11-23 ENCOUNTER — APPOINTMENT (OUTPATIENT)
Dept: GASTROENTEROLOGY | Facility: CLINIC | Age: 70
End: 2020-11-23
Payer: MEDICARE

## 2020-11-23 PROCEDURE — 43239 EGD BIOPSY SINGLE/MULTIPLE: CPT

## 2020-12-02 ENCOUNTER — NON-APPOINTMENT (OUTPATIENT)
Age: 70
End: 2020-12-02

## 2020-12-08 ENCOUNTER — APPOINTMENT (OUTPATIENT)
Dept: GASTROENTEROLOGY | Facility: CLINIC | Age: 70
End: 2020-12-08
Payer: MEDICARE

## 2020-12-08 VITALS
HEIGHT: 62 IN | DIASTOLIC BLOOD PRESSURE: 86 MMHG | TEMPERATURE: 97.6 F | SYSTOLIC BLOOD PRESSURE: 147 MMHG | HEART RATE: 78 BPM | BODY MASS INDEX: 36.44 KG/M2 | WEIGHT: 198 LBS

## 2020-12-08 PROCEDURE — 99072 ADDL SUPL MATRL&STAF TM PHE: CPT

## 2020-12-08 PROCEDURE — 99214 OFFICE O/P EST MOD 30 MIN: CPT

## 2020-12-08 RX ORDER — POLYETHYLENE GLYCOL 3350 AND ELECTROLYTES WITH LEMON FLAVOR 236; 22.74; 6.74; 5.86; 2.97 G/4L; G/4L; G/4L; G/4L; G/4L
236 POWDER, FOR SOLUTION ORAL
Qty: 1 | Refills: 0 | Status: DISCONTINUED | COMMUNITY
Start: 2018-03-08 | End: 2020-12-08

## 2020-12-08 NOTE — ASSESSMENT
[FreeTextEntry1] : 1.  Chronic bowel issues, including diarrhea, bloating, urgency, soiling; Helicobacter pylori gastritis at EGD November 2020--I am skeptical that Helicobacter pylori is the cause of her symptoms; perhaps she has bacterial overgrowth that may respond to standard triple therapy. Hyperplastic polyp, lipoma, hemorrhoids at colonoscopy April 2018. 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  History of elevated liver chemistries, statistically likely to have NAFLD.\par 3.  Obesity.\par 4.  Type 2 diabetes mellitus.\par 5.  Hypothyroidism.\par 6.  COPD; ex-smoker.\par 7.  Hyperlipidemia.\par 8.  Chronic renal insufficiency stage III.\par 9.  Hypertension.\par 10.  Osteoporosis.\par \par Plan:\par 1.  Diagnosis of H. pylori, implications, prognosis, treatment options discussed at length\par 2.  Triple therapy twice daily with food for 10 days to include: omeprazole 20 mg + amoxicillin 1 g + clarithromycin 500 mg. Hold atorvastatin while on clarithromycin.\par 3.  Submit stool specimen to lab for Helicobacter pylori antigen at least 2 weeks after triple therapy has been completed.\par 4.  If bowel issues continue to be problematic despite above, return here later this winter.  Might consider Xifaxan, cholestyramine, and/or short course of pancreatic enzymes, pending clinical course.

## 2020-12-08 NOTE — HISTORY OF PRESENT ILLNESS
[FreeTextEntry1] : EGD 11/23/2020 revealed Helicobacter pylori gastritis, but biopsies were negative for celiac disease or Mccann's.  She has typically been averaging several soft or loose stools daily, but this significantly improved after switching Metformin to alternate agent. Colonoscopy April 2018 revealed hyperplastic rectal polyp and lipoma, negative for colitis. Symptoms may have started shortly after gallbladder surgery in 1986.

## 2020-12-16 PROBLEM — Z12.11 ENCOUNTER FOR SCREENING FOR MALIGNANT NEOPLASM OF COLON: Status: RESOLVED | Noted: 2018-03-08 | Resolved: 2020-12-16

## 2020-12-17 ENCOUNTER — NON-APPOINTMENT (OUTPATIENT)
Age: 70
End: 2020-12-17

## 2020-12-21 ENCOUNTER — RX RENEWAL (OUTPATIENT)
Age: 70
End: 2020-12-21

## 2020-12-21 ENCOUNTER — APPOINTMENT (OUTPATIENT)
Dept: INTERNAL MEDICINE | Facility: CLINIC | Age: 70
End: 2020-12-21
Payer: MEDICARE

## 2020-12-21 VITALS
HEART RATE: 77 BPM | SYSTOLIC BLOOD PRESSURE: 140 MMHG | HEIGHT: 62 IN | WEIGHT: 203 LBS | BODY MASS INDEX: 37.36 KG/M2 | TEMPERATURE: 98 F | OXYGEN SATURATION: 98 % | DIASTOLIC BLOOD PRESSURE: 66 MMHG

## 2020-12-21 DIAGNOSIS — Z87.898 PERSONAL HISTORY OF OTHER SPECIFIED CONDITIONS: ICD-10-CM

## 2020-12-21 DIAGNOSIS — R19.4 CHANGE IN BOWEL HABIT: ICD-10-CM

## 2020-12-21 DIAGNOSIS — R19.8 OTHER SPECIFIED SYMPTOMS AND SIGNS INVOLVING THE DIGESTIVE SYSTEM AND ABDOMEN: ICD-10-CM

## 2020-12-21 LAB — HBA1C MFR BLD HPLC: 5.8

## 2020-12-21 PROCEDURE — 99072 ADDL SUPL MATRL&STAF TM PHE: CPT

## 2020-12-21 PROCEDURE — 83036 HEMOGLOBIN GLYCOSYLATED A1C: CPT | Mod: QW

## 2020-12-21 PROCEDURE — 99214 OFFICE O/P EST MOD 30 MIN: CPT | Mod: 25

## 2020-12-21 RX ORDER — CLARITHROMYCIN 500 MG/1
500 TABLET, FILM COATED ORAL TWICE DAILY
Qty: 20 | Refills: 0 | Status: COMPLETED | COMMUNITY
Start: 2020-12-08 | End: 2020-12-21

## 2020-12-21 RX ORDER — AMOXICILLIN 500 MG/1
500 CAPSULE ORAL TWICE DAILY
Qty: 40 | Refills: 0 | Status: COMPLETED | COMMUNITY
Start: 2020-12-08 | End: 2020-12-21

## 2020-12-21 RX ORDER — OMEPRAZOLE 20 MG/1
20 CAPSULE, DELAYED RELEASE ORAL
Qty: 20 | Refills: 0 | Status: COMPLETED | COMMUNITY
Start: 2020-12-08 | End: 2020-12-21

## 2020-12-21 NOTE — HISTORY OF PRESENT ILLNESS
[de-identified] : f/u on ch medical issues \par \par Lives in [place] \par sees Dr Trevon Berry - got referral from her \par  , 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 \par \par diarrhea- Irritable \par saw GASTRO consult reviewed S/p EGD 11/23- H pylori gastritis s/p Treatment Triple therapy twice daily with food for 10 days \par -much better now -perfect feels like a Miracle \par \par did MAmmo 1/2020 \par \par left shoulder rotator cuff tear - 2017 - better ROM no pain \par -saw ortho was told she is too old for sx , advised physical therapy \par -was admitted 8/11/17 - left shoulder pain ACS was r/o had tap joint r/o septic arthritis , did MRi dx as left shoulder rotator cuff tear \par - saw ortho too 5 days percoset stopped - ortho did not recommend Pt , now better no pain - was advised not to do things above her shoulder , weights etc \par  \par DM - followed by ENDO - Dr Lopez left J needs med renewed ,saw Ophth 3/2019 reviewed mild cataracts no retinopathy or neuropathy \par -on Glipizide 10 XL daily --180 at times -none in 200 \par - good readings \par - take medications daily, eat low carbohydrate diet, loose weight\par - saw ophtho 3/2019 no diabetic retinopathy \par  \par Hypothyroidism- TSH - stable on rx levothyroxine\par \par osteoporosis- did bone density 6/2017 followed by endo \par - now on Ibandronate 150 po monthly - needs new Endo \par -started vit d and calcium 1200 mg \par \par Small pericardial effusion - new onset 1/2017 when she was admitted for acute bronchitis and sob -saw Cardio DR Shankar Barnhart [address] - did echo , stress test - 1/2017 \par - saw cardio 2 weeks ago 6/13 BP was elevated he changed medication - he stopped losartan HCTZ \par -started on irbesartan HCTZ 300-12.5 , still on amlodipine 5 mg \par -he stopped atorvastatin \par \par Emphysema 1/2017 - smoked 2 PPD for 30 yrs , stopped 3 yrs ago when she had pneumonia , taking inhalers Spiriva and ProAir as needed \par -Ct chest 1/2017 emphysema and nodules small \par -Saw pulmonologist consult reviewed- Due for CT scan chest in July- will schedule apt \par -Working up for obstructive sleep apnea \par \par HTN- 4 ysr \par - irbesartan hctz 300 -12.5 and Amlodipine 5 mg daily \par \par did colonoscope 2018 reviewed \par Mammo - 9/2018 bi rad 3 - rt breast nodule due rt breast us did 7/2019 - 1/2020 \par PAP- 3/2017 \par

## 2020-12-21 NOTE — ASSESSMENT
[FreeTextEntry1] : IBS / diarrhea could be due to Metformin vs lactose intolerance vs IBS s/p EGD 11/23/2020 - H pylori - s/p treatment \par -  GASTRO consult reviewed \par -doing better \par - cont probiotics \par \par Elevated liver enzymes, morbid obesity, BMI 33--resolved \par -Acute hepatitis panel negative\par -Discuss weight loss\par \par Small pericardial effusion - new onset 1/2017 when she was admitted for acute bronchitis and sob -[facility] - did [facility] , stress test - 1/2017 reviewed.\par -denies CP , + MARK at base line , or palpations, or dizzy spells \par -Report from the cardiologist requested\par \par Emphysema 1/2017 - smoked 2 PPD for 30 yrs , stopped 3 yrs ago when she had pneumonia , taking inhalers Spiriva and ProAir as needed.\par - CT chest 1/2017 emphysema and nodules small -reviewed , repeat CT chest 8/2017 - reviewed stable.will make apt with pulm \par - MARK at base line , rx for walker with seat given \par - handicap permit signed \par -referral to see pulm given \par \par DM x 2 - for 2 yrs \par -POC aic 5.8 better \par -D/c metformin 1000 BID ( 9/2020 due to diarrhea) onGlipizide ER 10 qD  \par - Ophthalmologist-consult reviewed no retinopathy 2019 - F/U ADVICED \par - ASCVD risk - high ON atorvastatin to 40 repeat LFT and Lipids 3 months \par - on ASA 81 \par \par b12 deficiency - nl - 1000 mcg po daily advised \par  \par Hypothyroidism - x 1 yrs - stable , get tft \par -on levothyroxine 100 mcg daily ,\par \par HTN- 4 ysr -better now 142/78 \par -on Irbesartan HCTZ 300 -12.5 and Amlodipine 5 mg daily \par \par Osteoporosis 6/2017 dEXA - 1/2020 IMPROVING \par saw endo started Ibandronate 150 po monthly since > 1 yr now -gets it on 7th day of the Month \par - 0n Calcium 1200 mg daily \par -on vitamin d 1000 units daily \par - do weight bearing exercises: walking with weights, stair climbing , weight training , jogging, aerobics etc \par -repeat test 2 yrs for a f/u-\par \par HCM \par PAP- 3/2017 f/u advised \par colonoscope -4/2018 reviewed - polyp- repeat 5 yrs \par mammo- 9/2018 birad 3 due US 6 months did 7/2019 bi rad 2, 1/2020 \par Bone density -1/2020 - osteoporosis improving ,followed by endocrinologist on rx ordered \par Prevnar 13 given 5/2017\par Pneumovax -given 6/2018 \par tDAP 9/29/2020\par Hep C Negative\par flu vaccine received in pharmacy -9/1/2020 at [facility] \par shingles advised - pt to check insurance and make apt \par HIV screen - refused \par skin check advised.

## 2021-01-04 ENCOUNTER — LABORATORY RESULT (OUTPATIENT)
Age: 71
End: 2021-01-04

## 2021-01-05 ENCOUNTER — APPOINTMENT (OUTPATIENT)
Dept: GASTROENTEROLOGY | Facility: CLINIC | Age: 71
End: 2021-01-05

## 2021-01-06 LAB
ALBUMIN SERPL ELPH-MCNC: 4.5 G/DL
ANION GAP SERPL CALC-SCNC: 13 MMOL/L
BUN SERPL-MCNC: 27 MG/DL
CALCIUM SERPL-MCNC: 9.7 MG/DL
CHLORIDE SERPL-SCNC: 107 MMOL/L
CO2 SERPL-SCNC: 20 MMOL/L
CREAT SERPL-MCNC: 1.22 MG/DL
ESTIMATED AVERAGE GLUCOSE: 131 MG/DL
GLUCOSE SERPL-MCNC: 71 MG/DL
HBA1C MFR BLD HPLC: 6.2 %
PHOSPHATE SERPL-MCNC: 3.4 MG/DL
POTASSIUM SERPL-SCNC: 4.5 MMOL/L
SODIUM SERPL-SCNC: 140 MMOL/L
T3FREE SERPL-MCNC: 2.55 PG/ML
T4 FREE SERPL-MCNC: 1.3 NG/DL
TSH SERPL-ACNC: 2.01 UIU/ML

## 2021-01-07 ENCOUNTER — NON-APPOINTMENT (OUTPATIENT)
Age: 71
End: 2021-01-07

## 2021-01-14 ENCOUNTER — APPOINTMENT (OUTPATIENT)
Dept: OPHTHALMOLOGY | Facility: CLINIC | Age: 71
End: 2021-01-14
Payer: MEDICARE

## 2021-01-14 ENCOUNTER — NON-APPOINTMENT (OUTPATIENT)
Age: 71
End: 2021-01-14

## 2021-01-14 PROCEDURE — 99072 ADDL SUPL MATRL&STAF TM PHE: CPT

## 2021-01-14 PROCEDURE — 92004 COMPRE OPH EXAM NEW PT 1/>: CPT

## 2021-01-14 PROCEDURE — 92136 OPHTHALMIC BIOMETRY: CPT

## 2021-01-14 PROCEDURE — 92025 CPTRIZED CORNEAL TOPOGRAPHY: CPT

## 2021-02-23 ENCOUNTER — RX RENEWAL (OUTPATIENT)
Age: 71
End: 2021-02-23

## 2021-03-12 ENCOUNTER — APPOINTMENT (OUTPATIENT)
Dept: INTERNAL MEDICINE | Facility: CLINIC | Age: 71
End: 2021-03-12
Payer: MEDICARE

## 2021-03-12 ENCOUNTER — NON-APPOINTMENT (OUTPATIENT)
Age: 71
End: 2021-03-12

## 2021-03-12 VITALS
BODY MASS INDEX: 37.5 KG/M2 | HEART RATE: 65 BPM | DIASTOLIC BLOOD PRESSURE: 78 MMHG | OXYGEN SATURATION: 97 % | SYSTOLIC BLOOD PRESSURE: 132 MMHG | TEMPERATURE: 98 F | WEIGHT: 205 LBS

## 2021-03-12 PROCEDURE — 93000 ELECTROCARDIOGRAM COMPLETE: CPT

## 2021-03-12 PROCEDURE — 99072 ADDL SUPL MATRL&STAF TM PHE: CPT

## 2021-03-12 PROCEDURE — 99214 OFFICE O/P EST MOD 30 MIN: CPT | Mod: 25

## 2021-03-12 PROCEDURE — 36415 COLL VENOUS BLD VENIPUNCTURE: CPT

## 2021-03-12 RX ORDER — BECLOMETHASONE DIPROPIONATE 80 UG/1
80 AEROSOL, METERED RESPIRATORY (INHALATION) TWICE DAILY
Qty: 3 | Refills: 1 | Status: DISCONTINUED | COMMUNITY
Start: 2017-05-25 | End: 2021-03-12

## 2021-03-12 RX ORDER — FLUTICASONE FUROATE 200 UG/1
200 POWDER RESPIRATORY (INHALATION)
Qty: 3 | Refills: 1 | Status: DISCONTINUED | COMMUNITY
Start: 2017-05-25 | End: 2021-03-12

## 2021-03-12 RX ORDER — ALBUTEROL SULFATE 90 UG/1
108 (90 BASE) AEROSOL, METERED RESPIRATORY (INHALATION)
Qty: 3 | Refills: 1 | Status: DISCONTINUED | COMMUNITY
Start: 2017-03-31 | End: 2021-03-12

## 2021-03-12 RX ORDER — UMECLIDINIUM BROMIDE AND VILANTEROL TRIFENATATE 62.5; 25 UG/1; UG/1
62.5-25 POWDER RESPIRATORY (INHALATION) DAILY
Qty: 3 | Refills: 1 | Status: DISCONTINUED | COMMUNITY
Start: 2017-05-25 | End: 2021-03-12

## 2021-03-15 LAB
ALBUMIN SERPL ELPH-MCNC: 4.4 G/DL
ALP BLD-CCNC: 102 U/L
ALT SERPL-CCNC: 13 U/L
ANION GAP SERPL CALC-SCNC: 15 MMOL/L
APTT BLD: 31.2 SEC
AST SERPL-CCNC: 16 U/L
BASOPHILS # BLD AUTO: 0.09 K/UL
BASOPHILS NFR BLD AUTO: 1 %
BILIRUB SERPL-MCNC: 0.4 MG/DL
BUN SERPL-MCNC: 36 MG/DL
CALCIUM SERPL-MCNC: 9.9 MG/DL
CHLORIDE SERPL-SCNC: 104 MMOL/L
CO2 SERPL-SCNC: 21 MMOL/L
CREAT SERPL-MCNC: 1.4 MG/DL
EOSINOPHIL # BLD AUTO: 0.16 K/UL
EOSINOPHIL NFR BLD AUTO: 1.7 %
ESTIMATED AVERAGE GLUCOSE: 137 MG/DL
GLUCOSE SERPL-MCNC: 112 MG/DL
HBA1C MFR BLD HPLC: 6.4 %
HCT VFR BLD CALC: 40.3 %
HGB BLD-MCNC: 12.8 G/DL
IMM GRANULOCYTES NFR BLD AUTO: 0.4 %
INR PPP: 0.97 RATIO
LYMPHOCYTES # BLD AUTO: 3.06 K/UL
LYMPHOCYTES NFR BLD AUTO: 33.3 %
MAN DIFF?: NORMAL
MCHC RBC-ENTMCNC: 30.3 PG
MCHC RBC-ENTMCNC: 31.8 GM/DL
MCV RBC AUTO: 95.3 FL
MONOCYTES # BLD AUTO: 1.26 K/UL
MONOCYTES NFR BLD AUTO: 13.7 %
NEUTROPHILS # BLD AUTO: 4.58 K/UL
NEUTROPHILS NFR BLD AUTO: 49.9 %
PLATELET # BLD AUTO: 201 K/UL
POTASSIUM SERPL-SCNC: 5.1 MMOL/L
PROT SERPL-MCNC: 7.3 G/DL
PT BLD: 11.5 SEC
RBC # BLD: 4.23 M/UL
RBC # FLD: 14.3 %
SODIUM SERPL-SCNC: 139 MMOL/L
WBC # FLD AUTO: 9.19 K/UL

## 2021-03-15 NOTE — ASSESSMENT
[Patient Optimized for Surgery] : Patient optimized for surgery [As per surgery] : as per surgery [FreeTextEntry4] : Right eye cataract surgery - no contraindication for procedure \par \par IBS / diarrhea could be due to Metformin vs lactose intolerance vs IBS s/p EGD 11/23/2020 - H pylori - s/p treatment 1/4/21 stool test negative \par - GASTRO consult reviewed \par -doing better \par - cont probiotics \par \par Elevated liver enzymes, morbid obesity, BMI 33--resolved \par -Acute hepatitis panel negative\par -Discuss weight loss\par \par Small pericardial effusion - new onset 1/2017 when she was admitted for acute bronchitis and sob -[facility] , stress test - 1/2017 reviewed.\par -denies CP , + MARK at base line , or palpations, or dizzy spells \par \par Emphysema 1/2017 - smoked 2 PPD for 30 yrs , stopped 3 yrs ago when she had pneumonia , taking inhalers Spiriva and ProAir as needed.\par - CT chest 1/2017 emphysema and nodules small -reviewed , repeat CT chest 8/2017 - reviewed stable.will make apt with pulm \par - MARK at base line , rx for walker with seat given \par - handicap permit signed \par -referral to see pulm given \par \par DM x 2 - for 2 yrs \par -POC aic 5.8 better \par -D/c metformin 1000 BID ( 9/2020 due to diarrhea) onGlipizide ER 10 qD \par - Ophthalmologist-consult reviewed no retinopathy 2019 - F/U ADVICED \par - ASCVD risk - high ON atorvastatin to 40 repeat LFT and Lipids 3 months \par - on ASA 81 \par \par b12 deficiency - nl - 1000 mcg po daily advised \par  \par Hypothyroidism - x 1 yrs - stable , get tft \par -on levothyroxine 100 mcg daily ,\par \par HTN- 4 ysr -better now 142/78 \par -on Irbesartan HCTZ 300 -12.5 and Amlodipine 5 mg daily \par \par Osteoporosis 6/2017 dEXA - 1/2020 IMPROVING \par saw endo started Ibandronate 150 po monthly since > 1 yr now -gets it on 7th day of the Month \par - 0n Calcium 1200 mg daily \par -on vitamin d 1000 units daily \par - do weight bearing exercises: walking with weights, stair climbing , weight training , jogging, aerobics etc \par -repeat test 2 yrs for a f/u\par  [FreeTextEntry7] : hold diabetic medications on day of procedure and avoid mvi , NSAID 1 week before procedure

## 2021-03-15 NOTE — HISTORY OF PRESENT ILLNESS
[No Pertinent Cardiac History] : no history of aortic stenosis, atrial fibrillation, coronary artery disease, recent myocardial infarction, or implantable device/pacemaker [COPD] : COPD [No Adverse Anesthesia Reaction] : no adverse anesthesia reaction in self or family member [Diabetes] : diabetes [(Patient denies any chest pain, claudication, dyspnea on exertion, orthopnea, palpitations or syncope)] : Patient denies any chest pain, claudication, dyspnea on exertion, orthopnea, palpitations or syncope [Family Member] : no family member with adverse anesthesia reaction/sudden death [Self] : no previous adverse anesthesia reaction [Chronic Anticoagulation] : no chronic anticoagulation [Chronic Kidney Disease] : no chronic kidney disease [FreeTextEntry1] : Right eye cataract surgery  [FreeTextEntry2] : 3/17/2021 [FreeTextEntry3] : Dr SUMMERS  [FreeTextEntry4] : needs preop clearance for rt eye cataract sx \par \par Lives in [place] \par sees Dr Trevon Berry - got referral from her \par  , 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 \par \par diarrhea- Irritable \par saw GASTRO consult reviewed S/p EGD 11/23- H pylori gastritis s/p Treatment Triple therapy twice daily with food for 10 days - repeat stool test 1/4/21 negative \par -much better now -perfect feels like a Miracle \par \par did MAmmo 1/2020 \par \par left shoulder rotator cuff tear - 2017 - better ROM no pain \par -saw ortho was told she is too old for sx , advised physical therapy \par -was admitted 8/11/17 - left shoulder pain ACS was r/o had tap joint r/o septic arthritis , did MRi dx as left shoulder rotator cuff tear \par - saw ortho too 5 days percoset stopped - ortho did not recommend Pt , now better no pain - was advised not to do things above her shoulder , weights etc \par  \par DM - followed by ENDO - Dr Lopez left [facility] needs med renewed ,saw Ophth 3/2019 reviewed mild cataracts no retinopathy or neuropathy \par -on Glipizide 10 XL daily --180 at times -none in 200 \par - good readings \par - take medications daily, eat low carbohydrate diet, loose weight\par - saw ophtho 2021  no diabetic retinopathy - going for cataract surgery \par  \par Hypothyroidism- TSH - stable on rx levothyroxine\par \par osteoporosis- did bone density 6/2017 followed by endo \par - now on Ibandronate 150 po monthly - needs new Endo \par -started vit d and calcium 1200 mg \par \par Small pericardial effusion - new onset 1/2017 when she was admitted for acute bronchitis and sob -saw Cardio DR Shankar Barnhart [address] - did echo , stress test - 1/2017 \par - on irbesartan HCTZ , still on amlodipine 5 mg \par -he stopped atorvastatin \par \par Emphysema 1/2017 - smoked 2 PPD for 30 yrs , stopped 3 yrs ago when she had pneumonia , taking inhalers Spiriva and ProAir as needed \par -Ct chest 1/2017 emphysema and nodules small \par -Saw pulmonologist consult reviewed- Due for CT scan chest in July- will schedule apt \par -Working up for obstructive sleep apnea \par \par HTN- 4 ysr \par - irbesartan hctz 300 -12.5 and Amlodipine 5 mg daily \par

## 2021-03-15 NOTE — HISTORY OF PRESENT ILLNESS
[No Pertinent Cardiac History] : no history of aortic stenosis, atrial fibrillation, coronary artery disease, recent myocardial infarction, or implantable device/pacemaker [COPD] : COPD [No Adverse Anesthesia Reaction] : no adverse anesthesia reaction in self or family member [Diabetes] : diabetes [(Patient denies any chest pain, claudication, dyspnea on exertion, orthopnea, palpitations or syncope)] : Patient denies any chest pain, claudication, dyspnea on exertion, orthopnea, palpitations or syncope [Family Member] : no family member with adverse anesthesia reaction/sudden death [Self] : no previous adverse anesthesia reaction [Chronic Anticoagulation] : no chronic anticoagulation [Chronic Kidney Disease] : no chronic kidney disease [FreeTextEntry1] : Right eye cataract surgery  [FreeTextEntry2] : 3/17/2021 [FreeTextEntry3] : Dr SUMMERS  [FreeTextEntry4] : needs preop clearance for rt eye cataract sx \par \par Lives in [place] \par sees Dr Trevon Berry - got referral from her \par  , 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 \par \par diarrhea- Irritable \par saw GASTRO consult reviewed S/p EGD 11/23- H pylori gastritis s/p Treatment Triple therapy twice daily with food for 10 days - repeat stool test 1/4/21 negative \par -much better now -perfect feels like a Miracle \par \par did MAmmo 1/2020 \par \par left shoulder rotator cuff tear - 2017 - better ROM no pain \par -saw ortho was told she is too old for sx , advised physical therapy \par -was admitted 8/11/17 - left shoulder pain ACS was r/o had tap joint r/o septic arthritis , did MRi dx as left shoulder rotator cuff tear \par - saw ortho too 5 days percoset stopped - ortho did not recommend Pt , now better no pain - was advised not to do things above her shoulder , weights etc \par  \par DM - followed by ENDO - Dr Lopez left [facility] needs med renewed ,saw Ophth 3/2019 reviewed mild cataracts no retinopathy or neuropathy \par -on Glipizide 10 XL daily --180 at times -none in 200 \par - good readings \par - take medications daily, eat low carbohydrate diet, loose weight\par - saw ophtho 2021  no diabetic retinopathy - going for cataract surgery \par  \par Hypothyroidism- TSH - stable on rx levothyroxine\par \par osteoporosis- did bone density 6/2017 followed by endo \par - now on Ibandronate 150 po monthly - needs new Endo \par -started vit d and calcium 1200 mg \par \par Small pericardial effusion - new onset 1/2017 when she was admitted for acute bronchitis and sob -saw Cardio DR Shankar Barnhart [address] - did echo , stress test - 1/2017 \par - on irbesartan HCTZ , still on amlodipine 5 mg \par -he stopped atorvastatin \par \par Emphysema 1/2017 - smoked 2 PPD for 30 yrs , stopped 3 yrs ago when she had pneumonia , taking inhalers Spiriva and ProAir as needed \par -Ct chest 1/2017 emphysema and nodules small \par -Saw pulmonologist consult reviewed- Due for CT scan chest in July- will schedule apt \par -Working up for obstructive sleep apnea \par \par HTN- 4 ysr \par - irbesartan hctz 300 -12.5 and Amlodipine 5 mg daily \par

## 2021-03-15 NOTE — ASSESSMENT
[Patient Optimized for Surgery] : Patient optimized for surgery [As per surgery] : as per surgery [FreeTextEntry4] : Right eye cataract surgery - no contraindication for procedure \par \par IBS / diarrhea could be due to Metformin vs lactose intolerance vs IBS s/p EGD 11/23/2020 - H pylori - s/p treatment 1/4/21 stool test negative \par - GASTRO consult reviewed \par -doing better \par - cont probiotics \par \par Elevated liver enzymes, morbid obesity, BMI 33--resolved \par -Acute hepatitis panel negative\par -Discuss weight loss\par \par Small pericardial effusion - new onset 1/2017 when she was admitted for acute bronchitis and sob -[facility] - did [facility] , stress test - 1/2017 reviewed.\par -denies CP , + MARK at base line , or palpations, or dizzy spells \par \par Emphysema 1/2017 - smoked 2 PPD for 30 yrs , stopped 3 yrs ago when she had pneumonia , taking inhalers Spiriva and ProAir as needed.\par - CT chest 1/2017 emphysema and nodules small -reviewed , repeat CT chest 8/2017 - reviewed stable.will make apt with pulm \par - MARK at base line , rx for walker with seat given \par - handicap permit signed \par -referral to see pulm given \par \par DM x 2 - for 2 yrs \par -POC aic 5.8 better \par -D/c metformin 1000 BID ( 9/2020 due to diarrhea) onGlipizide ER 10 qD \par - Ophthalmologist-consult reviewed no retinopathy 2019 - F/U ADVICED \par - ASCVD risk - high ON atorvastatin to 40 repeat LFT and Lipids 3 months \par - on ASA 81 \par \par b12 deficiency - nl - 1000 mcg po daily advised \par  \par Hypothyroidism - x 1 yrs - stable , get tft \par -on levothyroxine 100 mcg daily ,\par \par HTN- 4 ysr -better now 142/78 \par -on Irbesartan HCTZ 300 -12.5 and Amlodipine 5 mg daily \par \par Osteoporosis 6/2017 dEXA - 1/2020 IMPROVING \par saw endo started Ibandronate 150 po monthly since > 1 yr now -gets it on 7th day of the Month \par - 0n Calcium 1200 mg daily \par -on vitamin d 1000 units daily \par - do weight bearing exercises: walking with weights, stair climbing , weight training , jogging, aerobics etc \par -repeat test 2 yrs for a f/u\par  [FreeTextEntry7] : hold diabetic medications on day of procedure and avoid mvi , NSAID 1 week before procedure

## 2021-03-16 ENCOUNTER — TRANSCRIPTION ENCOUNTER (OUTPATIENT)
Age: 71
End: 2021-03-16

## 2021-03-17 ENCOUNTER — NON-APPOINTMENT (OUTPATIENT)
Age: 71
End: 2021-03-17

## 2021-03-17 ENCOUNTER — APPOINTMENT (OUTPATIENT)
Dept: OPHTHALMOLOGY | Facility: AMBULATORY SURGERY CENTER | Age: 71
End: 2021-03-17

## 2021-03-17 ENCOUNTER — OUTPATIENT (OUTPATIENT)
Dept: OUTPATIENT SERVICES | Facility: HOSPITAL | Age: 71
LOS: 1 days | Discharge: HOME | End: 2021-03-17
Payer: MEDICARE

## 2021-03-17 LAB — GLUCOSE BLDC GLUCOMTR-MCNC: 119 MG/DL — HIGH (ref 70–99)

## 2021-03-17 PROCEDURE — 66982 XCAPSL CTRC RMVL CPLX WO ECP: CPT | Mod: RT

## 2021-03-18 ENCOUNTER — APPOINTMENT (OUTPATIENT)
Dept: OPHTHALMOLOGY | Facility: CLINIC | Age: 71
End: 2021-03-18
Payer: MEDICARE

## 2021-03-18 ENCOUNTER — APPOINTMENT (OUTPATIENT)
Dept: OPHTHALMOLOGY | Facility: CLINIC | Age: 71
End: 2021-03-18

## 2021-03-18 ENCOUNTER — NON-APPOINTMENT (OUTPATIENT)
Age: 71
End: 2021-03-18

## 2021-03-18 PROCEDURE — 99024 POSTOP FOLLOW-UP VISIT: CPT

## 2021-03-19 ENCOUNTER — NON-APPOINTMENT (OUTPATIENT)
Age: 71
End: 2021-03-19

## 2021-03-19 ENCOUNTER — APPOINTMENT (OUTPATIENT)
Dept: OPHTHALMOLOGY | Facility: CLINIC | Age: 71
End: 2021-03-19
Payer: MEDICARE

## 2021-03-19 PROCEDURE — 99024 POSTOP FOLLOW-UP VISIT: CPT

## 2021-03-22 ENCOUNTER — APPOINTMENT (OUTPATIENT)
Dept: OPHTHALMOLOGY | Facility: CLINIC | Age: 71
End: 2021-03-22
Payer: MEDICARE

## 2021-03-22 ENCOUNTER — NON-APPOINTMENT (OUTPATIENT)
Age: 71
End: 2021-03-22

## 2021-03-22 PROCEDURE — 99024 POSTOP FOLLOW-UP VISIT: CPT

## 2021-03-24 ENCOUNTER — APPOINTMENT (OUTPATIENT)
Dept: OPHTHALMOLOGY | Facility: CLINIC | Age: 71
End: 2021-03-24
Payer: MEDICARE

## 2021-03-24 ENCOUNTER — NON-APPOINTMENT (OUTPATIENT)
Age: 71
End: 2021-03-24

## 2021-03-24 PROCEDURE — 99024 POSTOP FOLLOW-UP VISIT: CPT

## 2021-03-25 ENCOUNTER — APPOINTMENT (OUTPATIENT)
Dept: OPHTHALMOLOGY | Facility: CLINIC | Age: 71
End: 2021-03-25
Payer: MEDICARE

## 2021-03-25 ENCOUNTER — NON-APPOINTMENT (OUTPATIENT)
Age: 71
End: 2021-03-25

## 2021-03-25 PROCEDURE — 99072 ADDL SUPL MATRL&STAF TM PHE: CPT

## 2021-03-25 PROCEDURE — 92020 GONIOSCOPY: CPT

## 2021-03-25 PROCEDURE — 92012 INTRM OPH EXAM EST PATIENT: CPT | Mod: 24

## 2021-04-08 ENCOUNTER — APPOINTMENT (OUTPATIENT)
Dept: OPHTHALMOLOGY | Facility: CLINIC | Age: 71
End: 2021-04-08
Payer: MEDICARE

## 2021-04-08 ENCOUNTER — NON-APPOINTMENT (OUTPATIENT)
Age: 71
End: 2021-04-08

## 2021-04-08 PROCEDURE — 99024 POSTOP FOLLOW-UP VISIT: CPT

## 2021-04-09 ENCOUNTER — RX RENEWAL (OUTPATIENT)
Age: 71
End: 2021-04-09

## 2021-04-16 ENCOUNTER — APPOINTMENT (OUTPATIENT)
Dept: OPHTHALMOLOGY | Facility: CLINIC | Age: 71
End: 2021-04-16
Payer: MEDICARE

## 2021-04-16 ENCOUNTER — NON-APPOINTMENT (OUTPATIENT)
Age: 71
End: 2021-04-16

## 2021-04-16 PROCEDURE — 92136 OPHTHALMIC BIOMETRY: CPT

## 2021-04-16 PROCEDURE — 99024 POSTOP FOLLOW-UP VISIT: CPT

## 2021-04-23 ENCOUNTER — APPOINTMENT (OUTPATIENT)
Dept: INTERNAL MEDICINE | Facility: CLINIC | Age: 71
End: 2021-04-23
Payer: MEDICARE

## 2021-04-23 VITALS
HEART RATE: 84 BPM | OXYGEN SATURATION: 97 % | SYSTOLIC BLOOD PRESSURE: 128 MMHG | DIASTOLIC BLOOD PRESSURE: 68 MMHG | TEMPERATURE: 98 F | WEIGHT: 209 LBS | BODY MASS INDEX: 38.23 KG/M2

## 2021-04-23 DIAGNOSIS — R79.89 OTHER SPECIFIED ABNORMAL FINDINGS OF BLOOD CHEMISTRY: ICD-10-CM

## 2021-04-23 DIAGNOSIS — H25.13 AGE-RELATED NUCLEAR CATARACT, BILATERAL: ICD-10-CM

## 2021-04-23 DIAGNOSIS — R19.5 OTHER FECAL ABNORMALITIES: ICD-10-CM

## 2021-04-23 DIAGNOSIS — Z23 ENCOUNTER FOR IMMUNIZATION: ICD-10-CM

## 2021-04-23 DIAGNOSIS — K58.0 IRRITABLE BOWEL SYNDROME WITH DIARRHEA: ICD-10-CM

## 2021-04-23 DIAGNOSIS — E11.9 TYPE 2 DIABETES MELLITUS W/OUT COMPLICATIONS: ICD-10-CM

## 2021-04-23 DIAGNOSIS — Z92.29 PERSONAL HISTORY OF OTHER DRUG THERAPY: ICD-10-CM

## 2021-04-23 DIAGNOSIS — Z01.818 ENCOUNTER FOR OTHER PREPROCEDURAL EXAMINATION: ICD-10-CM

## 2021-04-23 DIAGNOSIS — R14.0 ABDOMINAL DISTENSION (GASEOUS): ICD-10-CM

## 2021-04-23 DIAGNOSIS — Z87.898 PERSONAL HISTORY OF OTHER SPECIFIED CONDITIONS: ICD-10-CM

## 2021-04-23 PROCEDURE — 99072 ADDL SUPL MATRL&STAF TM PHE: CPT

## 2021-04-23 PROCEDURE — 99214 OFFICE O/P EST MOD 30 MIN: CPT

## 2021-04-23 RX ORDER — AZELASTINE HYDROCHLORIDE 137 UG/1
0.1 SPRAY, METERED NASAL TWICE DAILY
Qty: 3 | Refills: 1 | Status: COMPLETED | COMMUNITY
Start: 2017-03-31 | End: 2017-04-01

## 2021-04-23 NOTE — HISTORY OF PRESENT ILLNESS
[No Pertinent Cardiac History] : no history of aortic stenosis, atrial fibrillation, coronary artery disease, recent myocardial infarction, or implantable device/pacemaker [COPD] : COPD [No Adverse Anesthesia Reaction] : no adverse anesthesia reaction in self or family member [Diabetes] : diabetes [FreeTextEntry1] : lens placement [FreeTextEntry2] : May 13, 2021 [FreeTextEntry3] : Dr. Sadler and Dr. Lutz [FreeTextEntry4] : Patient presents for preoperative optimization for right lens placement.  Patient states that she was recently present for pre-operative visit for the right cataract extraction, however had complications and was not able to have the lens placed.  She is now here for that operation.\par \par Since last pre-operative exam, no changes to her health.  [Family Member] : family member

## 2021-04-23 NOTE — ASSESSMENT
[High Risk Surgery - Intraperitoneal, Intrathoracic or Supringuinal Vascular Procedures] : High Risk Surgery - Intraperitoneal, Intrathoracic or Supringuinal Vascular Procedures - No (0) [Ischemic Heart Disease] : Ischemic Heart Disease - No (0) [Congestive Heart Failure] : Congestive Heart Failure - No (0) [Prior Cerebrovascular Accident or TIA] : Prior Cerebrovascular Accident or TIA - No (0) [Creatinine >= 2mg/dL (1 Point)] : Creatinine >= 2mg/dL - No (0) [Insulin-dependent Diabetic (1 Point)] : Insulin-dependent Diabetic - No (0) [0] : 0 , RCRI Class: I, Risk of Post-Op Cardiac Complications: 3.9%, 95% CI for Risk Estimate: 2.8% - 5.4% [Continue medications as is] : Continue current medications [As per surgery] : as per surgery [FreeTextEntry7] : Hypertensive and thyroid medications with sips of water morning of surgery, no diabetes medication.

## 2021-04-23 NOTE — PHYSICAL EXAM
[No Acute Distress] : no acute distress [Well Developed] : well developed [Normal Voice/Communication] : normal voice/communication [Normal Sclera/Conjunctiva] : normal sclera/conjunctiva [No Lymphadenopathy] : no lymphadenopathy [No Respiratory Distress] : no respiratory distress  [Clear to Auscultation] : lungs were clear to auscultation bilaterally [Normal Rate] : normal rate  [Regular Rhythm] : with a regular rhythm [Normal S1, S2] : normal S1 and S2 [No Murmur] : no murmur heard [No Abdominal Bruit] : a ~M bruit was not heard ~T in the abdomen [No Edema] : there was no peripheral edema [Soft] : abdomen soft [Non-distended] : non-distended [Normal Bowel Sounds] : normal bowel sounds

## 2021-04-23 NOTE — REVIEW OF SYSTEMS
[Pain] : no pain [Chest Pain] : no chest pain [Palpitations] : no palpitations [Leg Claudication] : no leg claudication [Orthopnea] : no orthopnea [Shortness Of Breath] : no shortness of breath [Wheezing] : no wheezing [Cough] : no cough [Dyspnea on Exertion] : no dyspnea on exertion [Back Pain] : back pain [Dizziness] : no dizziness [Unsteady Walking] : ataxia [Depression] : no depression [Easy Bleeding] : no easy bleeding [Easy Bruising] : no easy bruising [Negative] : Genitourinary [FreeTextEntry3] : cloudy vision due to recent surgery [de-identified] : unsteadiness on feet due to diminished vision

## 2021-05-10 ENCOUNTER — RX RENEWAL (OUTPATIENT)
Age: 71
End: 2021-05-10

## 2021-05-12 ENCOUNTER — TRANSCRIPTION ENCOUNTER (OUTPATIENT)
Age: 71
End: 2021-05-12

## 2021-05-13 ENCOUNTER — OUTPATIENT (OUTPATIENT)
Dept: OUTPATIENT SERVICES | Facility: HOSPITAL | Age: 71
LOS: 1 days | Discharge: HOME | End: 2021-05-13
Payer: MEDICARE

## 2021-05-13 ENCOUNTER — APPOINTMENT (OUTPATIENT)
Dept: OPHTHALMOLOGY | Facility: AMBULATORY SURGERY CENTER | Age: 71
End: 2021-05-13

## 2021-05-13 LAB
ALBUMIN SERPL ELPH-MCNC: 3.8 G/DL — SIGNIFICANT CHANGE UP (ref 3.3–5)
ALP SERPL-CCNC: 88 U/L — SIGNIFICANT CHANGE UP (ref 40–120)
ALT FLD-CCNC: 14 U/L — SIGNIFICANT CHANGE UP (ref 10–45)
ANION GAP SERPL CALC-SCNC: 14 MMOL/L — SIGNIFICANT CHANGE UP (ref 5–17)
AST SERPL-CCNC: 23 U/L — SIGNIFICANT CHANGE UP (ref 10–40)
BILIRUB SERPL-MCNC: 0.5 MG/DL — SIGNIFICANT CHANGE UP (ref 0.2–1.2)
BUN SERPL-MCNC: 33 MG/DL — HIGH (ref 7–23)
CALCIUM SERPL-MCNC: 9.7 MG/DL — SIGNIFICANT CHANGE UP (ref 8.4–10.5)
CHLORIDE SERPL-SCNC: 108 MMOL/L — SIGNIFICANT CHANGE UP (ref 96–108)
CO2 SERPL-SCNC: 24 MMOL/L — SIGNIFICANT CHANGE UP (ref 22–31)
CREAT SERPL-MCNC: 1.7 MG/DL — HIGH (ref 0.5–1.3)
GLUCOSE BLDC GLUCOMTR-MCNC: 95 MG/DL — SIGNIFICANT CHANGE UP (ref 70–99)
GLUCOSE SERPL-MCNC: 114 MG/DL — HIGH (ref 70–99)
POTASSIUM SERPL-MCNC: 5 MMOL/L — SIGNIFICANT CHANGE UP (ref 3.5–5.3)
POTASSIUM SERPL-SCNC: 5 MMOL/L — SIGNIFICANT CHANGE UP (ref 3.5–5.3)
PROT SERPL-MCNC: 7.4 G/DL — SIGNIFICANT CHANGE UP (ref 6–8.3)
SODIUM SERPL-SCNC: 146 MMOL/L — HIGH (ref 135–145)

## 2021-05-13 PROCEDURE — 67039 LASER TREATMENT OF RETINA: CPT | Mod: 79,RT

## 2021-05-13 PROCEDURE — 66985 INSERT LENS PROSTHESIS: CPT | Mod: RT,79

## 2021-05-14 ENCOUNTER — APPOINTMENT (OUTPATIENT)
Dept: OPHTHALMOLOGY | Facility: CLINIC | Age: 71
End: 2021-05-14
Payer: MEDICARE

## 2021-05-14 ENCOUNTER — NON-APPOINTMENT (OUTPATIENT)
Age: 71
End: 2021-05-14

## 2021-05-14 PROCEDURE — 99024 POSTOP FOLLOW-UP VISIT: CPT

## 2021-05-21 ENCOUNTER — APPOINTMENT (OUTPATIENT)
Dept: OPHTHALMOLOGY | Facility: CLINIC | Age: 71
End: 2021-05-21
Payer: MEDICARE

## 2021-05-21 ENCOUNTER — NON-APPOINTMENT (OUTPATIENT)
Age: 71
End: 2021-05-21

## 2021-05-21 PROCEDURE — 99024 POSTOP FOLLOW-UP VISIT: CPT

## 2021-05-27 ENCOUNTER — APPOINTMENT (OUTPATIENT)
Dept: MAMMOGRAPHY | Facility: IMAGING CENTER | Age: 71
End: 2021-05-27

## 2021-05-27 ENCOUNTER — APPOINTMENT (OUTPATIENT)
Dept: ULTRASOUND IMAGING | Facility: IMAGING CENTER | Age: 71
End: 2021-05-27

## 2021-06-01 ENCOUNTER — RX RENEWAL (OUTPATIENT)
Age: 71
End: 2021-06-01

## 2021-06-09 ENCOUNTER — NON-APPOINTMENT (OUTPATIENT)
Age: 71
End: 2021-06-09

## 2021-06-09 ENCOUNTER — LABORATORY RESULT (OUTPATIENT)
Age: 71
End: 2021-06-09

## 2021-06-09 ENCOUNTER — APPOINTMENT (OUTPATIENT)
Dept: INTERNAL MEDICINE | Facility: CLINIC | Age: 71
End: 2021-06-09
Payer: MEDICARE

## 2021-06-09 VITALS
OXYGEN SATURATION: 98 % | HEART RATE: 71 BPM | HEIGHT: 62 IN | TEMPERATURE: 98.5 F | SYSTOLIC BLOOD PRESSURE: 136 MMHG | BODY MASS INDEX: 37.91 KG/M2 | WEIGHT: 206 LBS | DIASTOLIC BLOOD PRESSURE: 70 MMHG

## 2021-06-09 DIAGNOSIS — B96.81 GASTRITIS, UNSPECIFIED, W/OUT BLEEDING: ICD-10-CM

## 2021-06-09 DIAGNOSIS — K29.70 GASTRITIS, UNSPECIFIED, W/OUT BLEEDING: ICD-10-CM

## 2021-06-09 PROCEDURE — 99214 OFFICE O/P EST MOD 30 MIN: CPT | Mod: 25

## 2021-06-09 PROCEDURE — 99072 ADDL SUPL MATRL&STAF TM PHE: CPT

## 2021-06-09 PROCEDURE — 36415 COLL VENOUS BLD VENIPUNCTURE: CPT

## 2021-06-09 NOTE — HISTORY OF PRESENT ILLNESS
[Other: _____] : [unfilled] [de-identified] : f/u on ch medical issues \par \par Lives in [place] \par sees Dr Trevon Berry - got referral from her \par  , 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 \par \par followed by ophtho - for removal of stitches from lens rt eye - seeing halo has appt  6/11/21 \par \par diarrhea- Irritable bowel - resolved doing better \par saw GASTRO consult reviewed S/p EGD 11/23/2020- H pylori gastritis s/p Treatment Triple therapy twice daily with food for 10 days - repeat stool test 1/4/21 negative \par -much better now -perfect feels like a Miracle \par \par left shoulder rotator cuff tear - 2017 - better ROM no pain \par -saw ortho was told she is too old for sx , advised physical therapy \par -was admitted 8/11/17 - left shoulder pain ACS was r/o had tap joint r/o septic arthritis , did MRi dx as left shoulder rotator cuff tear \par - saw ortho too 5 days percoset stopped - ortho did not recommend Pt , now better no pain - was advised not to do things above her shoulder , weights etc \par  \par DM - followed by ENDO - Dr Lopez left [facility] needs med renewed ,saw Ophth 3/2019 reviewed mild cataracts no retinopathy or neuropathy \par -on Glipizide 10 XL daily --180 at times -none in 200 \par - good readings \par - take medications daily, eat low carbohydrate diet, loose weight\par - saw ophtho 2021 no diabetic retinopathy - going for cataract surgery \par  \par Hypothyroidism- TSH - stable on rx levothyroxine\par \par osteoporosis- did bone density 6/2017 followed by endo \par - now on Ibandronate 150 po monthly - needs new Endo \par -started vit d and calcium 1200 mg \par \par Small pericardial effusion - new onset 1/2017 when she was admitted for acute bronchitis and sob -saw Cardio DR Shankar Barnhart [address] - did echo , stress test - 1/2017 \par - on irbesartan HCTZ , still on amlodipine 5 mg \par -he stopped atorvastatin \par \par Emphysema 1/2017 - smoked 2 PPD for 30 yrs , stopped 3 yrs ago when she had pneumonia , taking inhalers Spiriva and ProAir as needed \par -Ct chest 1/2017 emphysema and nodules small \par -Saw pulmonologist consult reviewed- Due for CT scan chest in July- will schedule apt \par -Working up for obstructive sleep apnea \par \par HTN- 4 ysr \par - irbesartan hctz 300 -12.5 and Amlodipine 5 mg daily

## 2021-06-09 NOTE — ASSESSMENT
[FreeTextEntry1] : IBS / diarrhea could be due to Metformin vs lactose intolerance vs IBS s/p EGD 11/23/2020 - H pylori - s/p treatment 1/4/21 stool test negative \par - GASTRO consult reviewed \par -doing better - symptoms resolved \par - cont probiotics \par \par Elevated liver enzymes, morbid obesity, BMI 33--resolved \par -Acute hepatitis panel negative\par -Discuss weight loss\par \par Small pericardial effusion - new onset 1/2017 when she was admitted for acute bronchitis and sob -saw Cardio DR Chaves on [facility] [address] - did echo , stress test - 1/2017 reviewed.\par -denies CP , + MARK at base line , or palpations, or dizzy spells \par \par Emphysema 1/2017 - smoked 2 PPD for 30 yrs , stopped 3 yrs ago when she had pneumonia , taking inhalers Spiriva and ProAir as needed.\par - CT chest 1/2017 emphysema and nodules small -reviewed , repeat CT chest 8/2017 - reviewed stable.will make apt with pulm \par - MARK at base line , rx for walker with seat given \par - handicap permit signed \par -referral to see pulm given \par \par DM x 2 - for 2 yrs \par -POC aic 5.8 better \par -D/c metformin 1000 BID ( 9/2020 due to diarrhea) on Glipizide ER 10 qD \par - Ophthalmologist-consult reviewed no retinopathy 5/21/21 \par - ASCVD risk - high ON atorvastatin to 40 repeat LFT and Lipids 3 months \par - on ASA 81 \par \par b12 deficiency - nl - 1000 mcg po daily advised \par  \par Hypothyroidism - x 1 yrs - stable , get tft \par -on levothyroxine 100 mcg daily ,\par \par HTN- 4 ysr -better now 142/78 \par -on Irbesartan HCTZ 300 -12.5 and Amlodipine 5 mg daily \par \par Osteoporosis 6/2017 dEXA - 1/2020 IMPROVING -endo referral given \par saw endo started Ibandronate 150 po monthly since > 1 yr now -gets it on 7th day of the Month \par - 0n Calcium 1200 mg daily \par -on vitamin d 1000 units daily \par - do weight bearing exercises: walking with weights, stair climbing , weight training , jogging, aerobics etc \par -repeat test 2 yrs for a f/u\par  \par HCM \par PAP- 3/2017 f/u advised \par colonoscope -4/2018 reviewed - polyp- repeat 5 yrs \par mammo- 1/2020 bi rad 4 A s/p BX Fibrocystic changes  rx given pt will schedule apptointment \par Bone density -1/2020 - osteoporosis improving ,followed by endocrinologist on rx ordered \par Prevnar 13 given 5/2017\par Pneumovax -given 6/2018 \par tDAP 9/29/2020\par Hep C Negative\par flu vaccine received in pharmacy -9/1/2020 at [facility] \par shingles advised - pt to check insurance and make apt \par HIV screen - refused \par skin check advised. \par Pfizer 2/20/21 , 3/22/21 \par

## 2021-06-11 ENCOUNTER — NON-APPOINTMENT (OUTPATIENT)
Age: 71
End: 2021-06-11

## 2021-06-11 ENCOUNTER — APPOINTMENT (OUTPATIENT)
Dept: OPHTHALMOLOGY | Facility: CLINIC | Age: 71
End: 2021-06-11
Payer: MEDICARE

## 2021-06-11 LAB
ALBUMIN SERPL ELPH-MCNC: 4.7 G/DL
ALP BLD-CCNC: 122 U/L
ALT SERPL-CCNC: 19 U/L
ANION GAP SERPL CALC-SCNC: 12 MMOL/L
AST SERPL-CCNC: 17 U/L
BILIRUB SERPL-MCNC: 0.4 MG/DL
BUN SERPL-MCNC: 25 MG/DL
CALCIUM SERPL-MCNC: 10.2 MG/DL
CHLORIDE SERPL-SCNC: 103 MMOL/L
CHOLEST SERPL-MCNC: 209 MG/DL
CO2 SERPL-SCNC: 25 MMOL/L
CREAT SERPL-MCNC: 1.27 MG/DL
ESTIMATED AVERAGE GLUCOSE: 143 MG/DL
GLUCOSE SERPL-MCNC: 102 MG/DL
HBA1C MFR BLD HPLC: 6.6 %
HDLC SERPL-MCNC: 57 MG/DL
LDLC SERPL CALC-MCNC: 85 MG/DL
NONHDLC SERPL-MCNC: 153 MG/DL
POTASSIUM SERPL-SCNC: 4.7 MMOL/L
PROT SERPL-MCNC: 7.2 G/DL
SODIUM SERPL-SCNC: 141 MMOL/L
TRIGL SERPL-MCNC: 339 MG/DL
TSH SERPL-ACNC: 4.5 UIU/ML

## 2021-06-11 PROCEDURE — 99024 POSTOP FOLLOW-UP VISIT: CPT

## 2021-07-02 ENCOUNTER — APPOINTMENT (OUTPATIENT)
Dept: OPHTHALMOLOGY | Facility: CLINIC | Age: 71
End: 2021-07-02
Payer: MEDICARE

## 2021-07-02 ENCOUNTER — NON-APPOINTMENT (OUTPATIENT)
Age: 71
End: 2021-07-02

## 2021-07-02 PROCEDURE — 99024 POSTOP FOLLOW-UP VISIT: CPT

## 2021-07-16 ENCOUNTER — APPOINTMENT (OUTPATIENT)
Dept: ENDOCRINOLOGY | Facility: CLINIC | Age: 71
End: 2021-07-16
Payer: MEDICARE

## 2021-07-16 VITALS
HEART RATE: 85 BPM | BODY MASS INDEX: 39.2 KG/M2 | OXYGEN SATURATION: 97 % | WEIGHT: 213 LBS | SYSTOLIC BLOOD PRESSURE: 138 MMHG | DIASTOLIC BLOOD PRESSURE: 80 MMHG | RESPIRATION RATE: 17 BRPM | HEIGHT: 62 IN

## 2021-07-16 PROCEDURE — 99205 OFFICE O/P NEW HI 60 MIN: CPT | Mod: 25

## 2021-07-16 PROCEDURE — 99072 ADDL SUPL MATRL&STAF TM PHE: CPT

## 2021-07-16 PROCEDURE — 77080 DXA BONE DENSITY AXIAL: CPT

## 2021-07-16 PROCEDURE — ZZZZZ: CPT

## 2021-07-19 NOTE — HISTORY OF PRESENT ILLNESS
[Vitamin D (oral)] : Vitamin D orally [Ibandronate (Boniva)] : Ibandronate [Diabetes] : a history of diabetes [Family History of Osteoporosis] : family history of osteoporosis [FreeTextEntry1] : Pt states she was told of osteoporosis since 2018. Last BMD 1/2020. She started and is continuing Boniva, from Dr. Denise. Taking correctly, tolerating well. No interval fx, no UGI sx, no thigh pain. No aches & pains. No heartburn. Full dentures. No ONJ. Pt c/o back pain on day of dose which usually resolves. 0 falls in the last 6 months. Fh/o osteoporosis in siblings and daughter, no hip fx. H/o no fx. H/o cholecystectomy. No h/o kidney stones or calcium problems.. No ulcers or bleeding. No unusual risks for osteoporosis. No h/o RT. No chronic prednisone use. No h/o Paget's disease. Menopause 32. No HRT use. Up to date with mammography and GYN. Pt takes Vitamin D 1000 IU daily.\par \par Bone mineral density: 1/2020\par Indication: vs.outside study 2017\par Spine: -2.5 osteoporosis, no significant change\par Total hip: -1.1 osteopenia, -5.0%\par Femoral neck: -2.5 osteoporosis, no significant change\par Proximal radius: -3.0 osteoporosis, no significant change\par \par H/o hypothyroidism. Currently on LT4 100 mcg daily. No local neck pain. No dysphagia or dysphonia. No raciness, shakiness, or heat/cold intolerance. No palpitations, tremors, or sudden weight gain/loss. Some tiredness and fatigue.\par TSH elevated 4.50\par H/o T2DM dx 10+ years ago. Pt is currently on Glipizide 10 mg, previously on Metformin but had GI symptoms. Avg BS ~120. Weight increased. A1c 6.6%, appears tightly controlled. No recent urine microalbumin test. Last opthal within past 6 months. Elevated lipids, managed by Dr. Denise, managing on diet alone. [Low Vit D Intake/Exposure] : no low vitamin D intake [Premat. Menopause (natural)] : no history of premature menopause [Taking Steroids] : no history of taking steroids [Hyperparathyroidism] : no history of hyperparathyroidism [Kidney Stones] : no history of kidney stones [Previous Fragility Fracture] : no previous fragility fracture [Regular Dental Follow-Up] : no regular dental follow-up [Family History of Breast Cancer] : no family history of breast cancer [Family History of Hip Fracture] : no family history of hip fracture [History of Radiation Therapy] : no history of radiation therapy [History of Blood Clots] : no history of blood clots [High Fall Risk] : no fall risk [Frequent Falls] : no frequent falls [Uses a Walker/Cane] : no use of a walker/cane

## 2021-07-19 NOTE — ASSESSMENT
[Denosumab Therapy] : Risks  and benefits of denosumab therapy were discussed with the patient including eczema, cellulitis, osteonecrosis of the jaw and atypical femur fractures [Diabetic Medications] : Risks and benefits of diabetic medications were discussed [Levothyroxine] : The patient was instructed to take Levothyroxine on an empty stomach, separate from vitamins, and wait at least 30 minutes before eating [FreeTextEntry1] : Ms. PRINCE is a 70 year old female w/ osteoporosis, hypothyroidism, and T2DM\par \par Pt states she was told of osteoporosis since 2018. Last BMD 1/2020. She started and is continuing Boniva, from Dr. Denise. Taking correctly, tolerating well. No interval fx, no UGI sx, no thigh pain. No aches & pains. No heartburn. No ONJ. Pt c/o back pain on day of dose which usually resolves. 0 falls in the last 6 months. Fh/o osteoporosis in siblings and daughter, no hip fx. H/o no fx. No unusual risks for osteoporosis. Outside BMD 1/2020 indicates stable osteoporosis in spine, femoral neck, and proximal radius, and worsened osteopenia in total hip.\par Donald bone density 2021 shows progressive bone loss especially severe and proximal radius.  This raises concerns about primary secondary hyperparathyroidism.\par \par Patient advised for an increased risk of future fx. Options for medical therapy discussed in detail. I discussed transitioning to rx with twice a year Prolia. Risks and benefits discussed including thigh pain, interval fx, and ONJ. I discussed that pt cannot stop Prolia without expecting rapid bone loss and increase in risk for future fx unless they transition to another rx. Furthermore, there is 10 year safety data for Prolia. All questions were answered. Rx information handout provided. Pt understands and elects to start Prolia.\par   Recommend transition to Prolia 60 mg twice a year.  Patient can return in 3 weeks for first injection with registered nurse.\par H/o hypothyroidism. Currently on LT4 100 mcg daily. No local neck pain. No dysphagia or dysphonia. No raciness, shakiness, or heat/cold intolerance. No palpitations, tremors, or sudden weight gain/loss. Some tiredness and fatigue. Recommend increasing to LT4 112 mcg. Prescription sent.\par \par H/o T2DM dx 10+ years ago. Pt is currently on Glipizide 10 mg, previously on Metformin but had GI symptoms. Avg BS ~120. Weight increased. A1c 6.6%, appears tightly controlled. No recent urine microalbumin test. Last opthal within past 6 months. Elevated lipids, managed by Dr. Denise, managing on diet alone. Normal monofilament test. Recommend pt f/u w/ podiatry for bunions. Recommend pt d/c Glipizide due to risk of hypoglycemia, transition to Metformin 500 mg twice a day. Prescription sent. Pt can consider GLP1s such as Trulicity and Ozempic, for weight loss and to treat diabetes in the future.\par \par I recommend pt take no more than 500 mg Ca in addition to dietary intake and Vitamin D 1000 IU daily.\par \par Labs reviewed: Ca 10.2, normal. TSH 4.5, normal. Creatinine 1.27, normal. A1c 6.6%.\par \par F/u in 3 weeks for first dose of Prolia\par \par Pharmacological Management of Osteoporosis in Postmenopausal Women: An Endocrine Society  Clinical Practice Guideline. See R, Reagan STACY., Francois DM, Mike AM, Sonya MH, Linda D. JCEM 2019 [id number]

## 2021-07-19 NOTE — END OF VISIT
[FreeTextEntry3] : I, Tony Paulson, authored this note working as a medical scribe for Dr. Hirsch.  07/16/2021.  2:30PM. This note was authored by the medical scribe for me. I have reviewed, edited, and revised the note as needed. I am in agreement with the exam findings, imaging findings, and treatment plan.  Joce Hirsch MD  [Time Spent: ___ minutes] : I have spent [unfilled] minutes of time on the encounter.

## 2021-07-19 NOTE — PHYSICAL EXAM
[Alert] : alert [Well Nourished] : well nourished [No Acute Distress] : no acute distress [Well Developed] : well developed [Normal Sclera/Conjunctiva] : normal sclera/conjunctiva [EOMI] : extra ocular movement intact [No Proptosis] : no proptosis [Thyroid Not Enlarged] : the thyroid was not enlarged [No Thyroid Nodules] : no palpable thyroid nodules [Clear to Auscultation] : lungs were clear to auscultation bilaterally [Normal S1, S2] : normal S1 and S2 [Normal Rate] : heart rate was normal [Regular Rhythm] : with a regular rhythm [No Edema] : no peripheral edema [Pedal Pulses Normal] : the pedal pulses are present [Normal Bowel Sounds] : normal bowel sounds [Not Tender] : non-tender [Not Distended] : not distended [Soft] : abdomen soft [Normal Anterior Cervical Nodes] : no anterior cervical lymphadenopathy [No Spinal Tenderness] : no spinal tenderness [Spine Straight] : spine straight [No Stigmata of Cushings Syndrome] : no stigmata of Cushings Syndrome [Normal Gait] : normal gait [Normal Reflexes] : deep tendon reflexes were 2+ and symmetric [No Tremors] : no tremors [Normal Sensation on Monofilament Testing] : normal sensation on monofilament testing of lower extremities [Oriented x3] : oriented to person, place, and time [de-identified] : skin intact in feet

## 2021-07-19 NOTE — REASON FOR VISIT
[Consultation] : a consultation visit [DM Type 2] : DM Type 2 [Hypothyroidism] : hypothyroidism [Osteoporosis] : osteoporosis [Family Member] : family member [FreeTextEntry2] : Car Denise MD

## 2021-07-19 NOTE — CONSULT LETTER
[Dear  ___] : Dear  [unfilled], [Consult Letter:] : I had the pleasure of evaluating your patient, [unfilled]. [Please see my note below.] : Please see my note below. [Consult Closing:] : Thank you very much for allowing me to participate in the care of this patient.  If you have any questions, please do not hesitate to contact me. [FreeTextEntry2] : Car Denise MD

## 2021-07-19 NOTE — PROCEDURE
[FreeTextEntry1] : Bone mineral  density July 16, 2021\par Compare to outside study 2020 indication for previous test showed rapid bone loss\par Spine -2.6 osteoporosis prior -2.5\par Total hip -1.2 osteopenia prior -1.1\par Femoral neck -3.1 osteoporosis prior -2.5\par Proximal radius -4.3 osteoporosis prior -3.0

## 2021-08-09 ENCOUNTER — APPOINTMENT (OUTPATIENT)
Dept: ENDOCRINOLOGY | Facility: CLINIC | Age: 71
End: 2021-08-09
Payer: MEDICARE

## 2021-08-09 ENCOUNTER — NON-APPOINTMENT (OUTPATIENT)
Age: 71
End: 2021-08-09

## 2021-08-09 PROCEDURE — 96372 THER/PROPH/DIAG INJ SC/IM: CPT

## 2021-08-09 RX ORDER — DENOSUMAB 60 MG/ML
60 INJECTION SUBCUTANEOUS
Qty: 1 | Refills: 0 | Status: COMPLETED | OUTPATIENT
Start: 2021-08-09

## 2021-08-09 RX ADMIN — DENOSUMAB 0 MG/ML: 60 INJECTION SUBCUTANEOUS at 00:00

## 2021-09-30 ENCOUNTER — APPOINTMENT (OUTPATIENT)
Dept: INTERNAL MEDICINE | Facility: CLINIC | Age: 71
End: 2021-09-30
Payer: MEDICARE

## 2021-09-30 VITALS
HEART RATE: 88 BPM | TEMPERATURE: 98.1 F | OXYGEN SATURATION: 98 % | WEIGHT: 218 LBS | SYSTOLIC BLOOD PRESSURE: 134 MMHG | BODY MASS INDEX: 39.87 KG/M2 | DIASTOLIC BLOOD PRESSURE: 66 MMHG

## 2021-09-30 LAB — HBA1C MFR BLD HPLC: 7.3

## 2021-09-30 PROCEDURE — 83036 HEMOGLOBIN GLYCOSYLATED A1C: CPT | Mod: QW

## 2021-09-30 PROCEDURE — 90662 IIV NO PRSV INCREASED AG IM: CPT

## 2021-09-30 PROCEDURE — G0444 DEPRESSION SCREEN ANNUAL: CPT

## 2021-09-30 PROCEDURE — G0008: CPT

## 2021-09-30 PROCEDURE — G0439: CPT

## 2021-09-30 PROCEDURE — 36415 COLL VENOUS BLD VENIPUNCTURE: CPT

## 2021-09-30 PROCEDURE — G0442 ANNUAL ALCOHOL SCREEN 15 MIN: CPT

## 2021-09-30 RX ORDER — IBANDRONATE SODIUM 150 MG/1
150 TABLET ORAL
Qty: 3 | Refills: 3 | Status: DISCONTINUED | COMMUNITY
Start: 2018-06-21 | End: 2021-09-30

## 2021-09-30 NOTE — HEALTH RISK ASSESSMENT
[Good] : ~his/her~  mood as  good [Yes] : Yes [Monthly or less (1 pt)] : Monthly or less (1 point) [1 or 2 (0 pts)] : 1 or 2 (0 points) [Never (0 pts)] : Never (0 points) [No falls in past year] : Patient reported no falls in the past year [0] : 2) Feeling down, depressed, or hopeless: Not at all (0) [PHQ-2 Negative - No further assessment needed] : PHQ-2 Negative - No further assessment needed [With Significant Other] : lives with significant other [Employed] : employed [] :  [Fully functional (bathing, dressing, toileting, transferring, walking, feeding)] : Fully functional (bathing, dressing, toileting, transferring, walking, feeding) [Fully functional (using the telephone, shopping, preparing meals, housekeeping, doing laundry, using] : Fully functional and needs no help or supervision to perform IADLs (using the telephone, shopping, preparing meals, housekeeping, doing laundry, using transportation, managing medications and managing finances) [] : No [Audit-CScore] : 0 [de-identified] : walking ,  [[id number]] : 0 [Reports changes in hearing] : Reports no changes in hearing [Reports changes in vision] : Reports no changes in vision [Reports changes in dental health] : Reports no changes in dental health

## 2021-09-30 NOTE — HISTORY OF PRESENT ILLNESS
[Other: _____] : [unfilled] [de-identified] : f/u on ch medical issues \par \par Lives in [place] \par sees Dr Trevon Berry - got referral from her \par  , 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 \par \par DM - was followed by ENDO - Dr Lopez left [facility] needs med renewed ,now seeing Dr Rodriguez \par -her Glipizide 10 XL daily-was discontinued and was started on metformin and ozympic - could not tolerate metformin again due to stomach issues - she stopped it - ozympic caused her severe nausea - she held it for last 2 weeks feels better - for last few weeks she has not taken any medications for her DM \par -gained weight \par - saw ophtho 2021 no diabetic retinopathy - did cataract surgery rt eye has complications - pain after surgery - unable to tolerate new lenses \par \par diarrhea- Irritable bowel - resolved doing better \par saw GASTRO consult reviewed S/p EGD 11/23/2020- H pylori gastritis s/p Treatment Triple therapy twice daily with food for 10 days - repeat stool test 1/4/21 negative \par -much better now -perfect feels like a Miracle \par \par left shoulder rotator cuff tear - 2017 - better ROM no pain \par -saw ortho was told she is too old for sx , advised physical therapy \par -was admitted 8/11/17 - left shoulder pain ACS was r/o had tap joint r/o septic arthritis , did MRi dx as left shoulder rotator cuff tear \par - saw ortho too 5 days percoset stopped - ortho did not recommend Pt , now better no pain - was advised not to do things above her shoulder , weights etc \par  \par Hypothyroidism- TSH -dose was increased to 112 mcg 7/2021 by endo  rx levothyroxine\par \par osteoporosis- did bone density 6/2017 followed by endo \par - was on Ibandronate 150 po monthly - till 6/2021 - dexa 7/2021 -worsening osteoporosis was started on Prolia injections\par -started vit d and calcium 1200 mg \par \par Small pericardial effusion - new onset 1/2017 when she was admitted for acute bronchitis and sob -saw Cardio DR Shankar Barnhart on [facility] [address] - did echo , stress test - 1/2017 \par - on irbesartan HCTZ , still on amlodipine 5 mg \par -he stopped atorvastatin \par \par Emphysema 1/2017 - smoked 2 PPD for 30 yrs , stopped 3 yrs ago when she had pneumonia , taking inhalers Spiriva and ProAir as needed \par -Ct chest 1/2017 emphysema and nodules small \par -Saw pulmonologist consult reviewed- Due for CT scan chest in July- will schedule apt \par -Working up for obstructive sleep apnea \par \par HTN- 4 ysr \par - irbesartan hctz 300 -12.5 and Amlodipine 5 mg daily \par

## 2021-09-30 NOTE — ASSESSMENT
[FreeTextEntry1] : \par Depression/ anxiety - PHQ-9 score -9 mild depression \par -Depression screening done at this visit. 15 minute spent in assessment and review.\par -Denies homicidal or suicidal ideas or thoughts\par -Discussed with patient in detail side effects of all medications, hand out given, advise patient to inform family member that he or she is taking the  medication and to watch out for any personality changes, to seek medical attention immediately if they notice any difference. \par -pt deferred appt to see psych/ therapist \par - start setraline 25 QHS \par - f/u 6 weeks \par \par DM x 2 - \par -AIC 7.3 9/30/21 \par was 6.6 6/2021 \par -D/c metformin 1000 BID ( 9/2020 due to diarrhea) pt restarted on Glipizide ER 5 qD -will be rechallanging Ozympic this weekend - if it does not work will d/w her endo for medication adjustment \par - Ophthalmologist-consult reviewed no retinopathy 5/21/21 \par - ASCVD risk - high ON atorvastatin to 40 repeat LFT and Lipids 3 months \par - on ASA 81 \par \par IBS / diarrhea could be due to Metformin vs lactose intolerance vs IBS s/p EGD 11/23/2020 - H pylori - s/p treatment 1/4/21 stool test negative \par - GASTRO consult reviewed \par -doing better - symptoms resolved \par - cont probiotics \par \par Elevated liver enzymes, morbid obesity, BMI 33--resolved \par -Acute hepatitis panel negative\par -Discuss weight loss\par \par Small pericardial effusion - new onset 1/2017 when she was admitted for acute bronchitis and sob -saw Cardio DR Chaves on [facility] - did echo , stress test - 1/2017 reviewed.\par -denies CP , + MARK at base line , or palpations, or dizzy spells \par \par Emphysema 1/2017 - smoked 2 PPD for 30 yrs , stopped 3 yrs ago when she had pneumonia , taking inhalers Spiriva and ProAir as needed.\par - CT chest 1/2017 emphysema and nodules small -reviewed , repeat CT chest 8/2017 - reviewed stable.will make apt with pulm \par - MARK at base line , rx for walker with seat given \par - handicap permit signed \par -referral to see pulm given \par \par b12 deficiency - nl - 1000 mcg po daily advised \par  \par Hypothyroidism - x 1 yrs - stable , get tft \par -on levothyroxine 112 mcg daily ,\par \par HTN- 4 ysr -better now 142/78 \par -on Irbesartan HCTZ 300 -12.5 and Amlodipine 5 mg daily \par \par Osteoporosis 6/2017 dEXA - 1/2020 --> 7/2021 dexa worsened - saw endo -started Prolia injections 8/9/21  \par - 0n Calcium 1200 mg daily \par -on vitamin d 1000 units daily \par - do weight bearing exercises: walking with weights, stair climbing , weight training , jogging, aerobics etc \par -repeat test 2 yrs for a f/u\par  \par HCM \par PAP- 2019 as per pt \par colonoscope -4/2018 reviewed - polyp- repeat 5 yrs \par mammo- 1/2020 bi rad 4 A s/p BX Fibrocystic changes rx given pt will schedule apptointment -due referral given again \par Bone density -7/2021 - osteoporosis worsened ,followed by endocrinologist on prolia q 6 months \par Prevnar 13 given 5/2017\par Pneumovax -given 6/2018 \par tDAP 9/29/2020\par Hep C Negative\par flu vaccine received in pharmacy -9/30/21 \par shingles advised - pt to check insurance and make apt \par HIV screen - refused \par skin check advised. \par Pfizer 2/20/21 , 3/22/21 - booster adviced \par . \par

## 2021-10-05 LAB
25(OH)D3 SERPL-MCNC: 39.1 NG/ML
ALBUMIN SERPL ELPH-MCNC: 4.6 G/DL
ALP BLD-CCNC: 97 U/L
ALT SERPL-CCNC: 18 U/L
ANION GAP SERPL CALC-SCNC: 15 MMOL/L
APPEARANCE: CLEAR
AST SERPL-CCNC: 19 U/L
BASOPHILS # BLD AUTO: 0.08 K/UL
BASOPHILS NFR BLD AUTO: 0.9 %
BILIRUB SERPL-MCNC: 0.4 MG/DL
BILIRUBIN URINE: NEGATIVE
BLOOD URINE: NEGATIVE
BUN SERPL-MCNC: 24 MG/DL
CALCIUM SERPL-MCNC: 9.4 MG/DL
CHLORIDE SERPL-SCNC: 105 MMOL/L
CHOLEST SERPL-MCNC: 204 MG/DL
CO2 SERPL-SCNC: 18 MMOL/L
COLOR: NORMAL
CREAT SERPL-MCNC: 1.21 MG/DL
CREAT SPEC-SCNC: 123 MG/DL
EOSINOPHIL # BLD AUTO: 0.19 K/UL
EOSINOPHIL NFR BLD AUTO: 2 %
GLUCOSE QUALITATIVE U: NEGATIVE
GLUCOSE SERPL-MCNC: 161 MG/DL
HCT VFR BLD CALC: 43 %
HDLC SERPL-MCNC: 48 MG/DL
HGB BLD-MCNC: 13.3 G/DL
IMM GRANULOCYTES NFR BLD AUTO: 0.3 %
KETONES URINE: NEGATIVE
LDLC SERPL CALC-MCNC: 91 MG/DL
LEUKOCYTE ESTERASE URINE: NEGATIVE
LYMPHOCYTES # BLD AUTO: 3.26 K/UL
LYMPHOCYTES NFR BLD AUTO: 35.2 %
MAN DIFF?: NORMAL
MCHC RBC-ENTMCNC: 30.1 PG
MCHC RBC-ENTMCNC: 30.9 GM/DL
MCV RBC AUTO: 97.3 FL
MICROALBUMIN 24H UR DL<=1MG/L-MCNC: 2.3 MG/DL
MICROALBUMIN/CREAT 24H UR-RTO: 18 MG/G
MONOCYTES # BLD AUTO: 0.9 K/UL
MONOCYTES NFR BLD AUTO: 9.7 %
NEUTROPHILS # BLD AUTO: 4.81 K/UL
NEUTROPHILS NFR BLD AUTO: 51.9 %
NITRITE URINE: NEGATIVE
NONHDLC SERPL-MCNC: 156 MG/DL
PH URINE: 6
PLATELET # BLD AUTO: 200 K/UL
POTASSIUM SERPL-SCNC: 4.6 MMOL/L
PROT SERPL-MCNC: 7.1 G/DL
PROTEIN URINE: NORMAL
RBC # BLD: 4.42 M/UL
RBC # FLD: 15.2 %
SODIUM SERPL-SCNC: 138 MMOL/L
SPECIFIC GRAVITY URINE: 1.02
T3FREE SERPL-MCNC: 2.93 PG/ML
T4 FREE SERPL-MCNC: 1.3 NG/DL
TRIGL SERPL-MCNC: 325 MG/DL
TSH SERPL-ACNC: 2.6 UIU/ML
UROBILINOGEN URINE: NORMAL
VIT B12 SERPL-MCNC: 387 PG/ML
WBC # FLD AUTO: 9.27 K/UL

## 2021-10-27 ENCOUNTER — TRANSCRIPTION ENCOUNTER (OUTPATIENT)
Age: 71
End: 2021-10-27

## 2021-11-18 ENCOUNTER — RESULT REVIEW (OUTPATIENT)
Age: 71
End: 2021-11-18

## 2021-11-18 ENCOUNTER — APPOINTMENT (OUTPATIENT)
Dept: INTERNAL MEDICINE | Facility: CLINIC | Age: 71
End: 2021-11-18
Payer: MEDICARE

## 2021-11-18 ENCOUNTER — APPOINTMENT (OUTPATIENT)
Dept: ULTRASOUND IMAGING | Facility: IMAGING CENTER | Age: 71
End: 2021-11-18
Payer: MEDICARE

## 2021-11-18 ENCOUNTER — APPOINTMENT (OUTPATIENT)
Dept: MAMMOGRAPHY | Facility: IMAGING CENTER | Age: 71
End: 2021-11-18
Payer: MEDICARE

## 2021-11-18 ENCOUNTER — OUTPATIENT (OUTPATIENT)
Dept: OUTPATIENT SERVICES | Facility: HOSPITAL | Age: 71
LOS: 1 days | End: 2021-11-18
Payer: COMMERCIAL

## 2021-11-18 VITALS
DIASTOLIC BLOOD PRESSURE: 78 MMHG | SYSTOLIC BLOOD PRESSURE: 132 MMHG | WEIGHT: 213 LBS | BODY MASS INDEX: 38.96 KG/M2 | TEMPERATURE: 98.1 F | HEART RATE: 89 BPM | OXYGEN SATURATION: 97 %

## 2021-11-18 DIAGNOSIS — Z00.8 ENCOUNTER FOR OTHER GENERAL EXAMINATION: ICD-10-CM

## 2021-11-18 PROCEDURE — 77067 SCR MAMMO BI INCL CAD: CPT

## 2021-11-18 PROCEDURE — 77067 SCR MAMMO BI INCL CAD: CPT | Mod: 26

## 2021-11-18 PROCEDURE — 76641 ULTRASOUND BREAST COMPLETE: CPT | Mod: 26,50

## 2021-11-18 PROCEDURE — 76641 ULTRASOUND BREAST COMPLETE: CPT

## 2021-11-18 PROCEDURE — 77063 BREAST TOMOSYNTHESIS BI: CPT | Mod: 26

## 2021-11-18 PROCEDURE — 99214 OFFICE O/P EST MOD 30 MIN: CPT | Mod: 25

## 2021-11-18 PROCEDURE — 77063 BREAST TOMOSYNTHESIS BI: CPT

## 2021-11-18 PROCEDURE — 36415 COLL VENOUS BLD VENIPUNCTURE: CPT

## 2021-11-18 NOTE — ASSESSMENT
[FreeTextEntry1] : Depression/ anxiety - PHQ-9 score -7 mild depression improving \par -Depression screening done at this visit. 15 minute spent in assessment and review.\par -Denies homicidal or suicidal ideas or thoughts\par -Discussed with patient in detail side effects of all medications, hand out given, advise patient to inform family member that he or she is taking the medication and to watch out for any personality changes, to seek medical attention immediately if they notice any difference. \par -pt deferred appt to see psych/ therapist \par - change setraline 50 QHS \par - f/u 3 month s\par \par DM x 2 - \par -AIC 7.3 9/30/21 \par was 6.6 6/2021 \par -D/c metformin 1000 BID ( 9/2020 due to diarrhea) pt restarted on Glipizide ER 5 qD -will be rechallanging Ozympic this weekend - if it does not work will d/w her endo for medication adjustment \par - Ophthalmologist-consult reviewed no retinopathy 5/21/21 \par - ASCVD risk - high ON atorvastatin to 40 repeat LFT and Lipids 3 months \par - on ASA 81 \par \par IBS / diarrhea could be due to Metformin vs lactose intolerance vs IBS s/p EGD 11/23/2020 - H pylori - s/p treatment 1/4/21 stool test negative \par - GASTRO consult reviewed \par -doing better - symptoms resolved \par - cont probiotics \par \par Elevated liver enzymes, morbid obesity, BMI 33--resolved \par -Acute hepatitis panel negative\par -Discuss weight loss\par \par Small pericardial effusion - new onset 1/2017 when she was admitted for acute bronchitis and sob -saw Cardio DR Chaves on [facility] - did echo , stress test - 1/2017 reviewed.\par -denies CP , + MARK at base line , or palpations, or dizzy spells \par \par Emphysema 1/2017 - smoked 2 PPD for 30 yrs , stopped 3 yrs ago when she had pneumonia , taking inhalers Spiriva and ProAir as needed.\par - CT chest 1/2017 emphysema and nodules small -reviewed , repeat CT chest 8/2017 - reviewed stable.will make apt with pulm \par - MARK at base line , rx for walker with seat given \par - handicap permit signed \par -referral to see pulm given \par \par b12 deficiency - nl - 1000 mcg po daily advised \par  \par Hypothyroidism - x 1 yrs - stable , get tft \par -on levothyroxine 112 mcg daily ,\par \par HTN- 4 ysr -better now 142/78 \par -on Irbesartan HCTZ 300 -12.5 and Amlodipine 5 mg daily \par \par Osteoporosis 6/2017 dEXA - 1/2020 --> 7/2021 dexa worsened - saw endo -started Prolia injections 8/9/21 \par - 0n Calcium 1200 mg daily \par -on vitamin d 1000 units daily \par - do weight bearing exercises: walking with weights, stair climbing , weight training , jogging, aerobics etc \par -repeat test 2 yrs for a f/u\par  \par HCM \par PAP- 2019 as per pt \par colonoscope -4/2018 reviewed - polyp- repeat 5 yrs \par mammo- 1/2020 bi rad 4 A s/p BX Fibrocystic changes rx given pt will schedule apptointment -due referral given again -has appt today \par Bone density -7/2021 - osteoporosis worsened ,followed by endocrinologist on prolia q 6 months \par Prevnar 13 given 5/2017\par Pneumovax -given 6/2018 \par tDAP 9/29/2020\par Hep C Negative\par flu vaccine received in pharmacy -9/30/21 \par shingles advised - pt to check insurance and make apt \par HIV screen - refused \par skin check advised. \par Pfizer 2/20/21 , 3/22/21 - booster 10/16/21\par

## 2021-11-18 NOTE — HISTORY OF PRESENT ILLNESS
[de-identified] : f/u on ch medical issues \par \par Lives in [place] \par sees Dr Trevon Berry - got referral from her \par  , 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 \par \par Depression - feeling better with sertraline 25 mg -25 % improvement  thinks there is room for increasing dose \par \par DM -AIC 7.3 9/21 \par - was followed by ENDO - Dr Lopez left J needs med renewed ,now seeing Dr Rodriguez \par -her Glipizide 10 XL daily-was discontinued and was started on metformin and ozympic - could not tolerate metformin again due to stomach issues - she stopped it - ozympic caused her severe nausea - she held it for last 2 weeks feels better - for last few weeks she has not taken any medications for her DM \par -gained weight \par - saw ophtho 2021 no diabetic retinopathy - did cataract surgery rt eye has complications - pain after surgery - unable to tolerate new lenses \par \par diarrhea- Irritable bowel - resolved doing better \par saw GASTRO consult reviewed S/p EGD 11/23/2020- H pylori gastritis s/p Treatment Triple therapy twice daily with food for 10 days - repeat stool test 1/4/21 negative \par -much better now -perfect feels like a Miracle \par \par left shoulder rotator cuff tear - 2017 - better ROM no pain \par -saw ortho was told she is too old for sx , advised physical therapy \par -was admitted 8/11/17 - left shoulder pain ACS was r/o had tap joint r/o septic arthritis , did MRi dx as left shoulder rotator cuff tear \par - saw ortho too 5 days percoset stopped - ortho did not recommend Pt , now better no pain - was advised not to do things above her shoulder , weights etc \par  \par Hypothyroidism- TSH -dose was increased to 112 mcg 7/2021 by endo rx levothyroxine\par \par osteoporosis- did bone density 6/2017 followed by endo \par - was on Ibandronate 150 po monthly - till 6/2021 - dexa 7/2021 -worsening osteoporosis was started on Prolia injections\par -started vit d and calcium 1200 mg \par \par Small pericardial effusion - new onset 1/2017 when she was admitted for acute bronchitis and sob -saw Cardio DR Shankar Barn[address] - did echo , stress test - 1/2017 \par - on irbesartan HCTZ , still on amlodipine 5 mg \par -he stopped atorvastatin \par \par Emphysema 1/2017 - smoked 2 PPD for 30 yrs , stopped 3 yrs ago when she had pneumonia , taking inhalers Spiriva and ProAir as needed \par -Ct chest 1/2017 emphysema and nodules small \par -Saw pulmonologist consult reviewed- Due for CT scan chest in July- will schedule apt \par -Working up for obstructive sleep apnea \par \par HTN- 4 ysr \par - irbesartan hctz 300 -12.5 and Amlodipine 5 mg daily \par

## 2021-11-19 LAB
CHOLEST SERPL-MCNC: 166 MG/DL
HDLC SERPL-MCNC: 48 MG/DL
LDLC SERPL CALC-MCNC: 72 MG/DL
NONHDLC SERPL-MCNC: 118 MG/DL
TRIGL SERPL-MCNC: 228 MG/DL

## 2021-12-01 ENCOUNTER — RX RENEWAL (OUTPATIENT)
Age: 71
End: 2021-12-01

## 2021-12-31 ENCOUNTER — RX RENEWAL (OUTPATIENT)
Age: 71
End: 2021-12-31

## 2021-12-31 DIAGNOSIS — F34.1 DYSTHYMIC DISORDER: ICD-10-CM

## 2022-01-06 ENCOUNTER — RX RENEWAL (OUTPATIENT)
Age: 72
End: 2022-01-06

## 2022-01-27 NOTE — ED PROVIDER NOTE - CONSTITUTIONAL NOURISHMENT, MLM
I called her Son number wouldn't go through, called the pt and she asked that I call her Son to have it rescheduled and she gave me the correct number   I spoke with her Son and gave him the number to scheduling and he will call to get it rescheduled.   OBESE

## 2022-02-17 ENCOUNTER — APPOINTMENT (OUTPATIENT)
Dept: ENDOCRINOLOGY | Facility: CLINIC | Age: 72
End: 2022-02-17
Payer: MEDICARE

## 2022-02-17 VITALS
TEMPERATURE: 98 F | BODY MASS INDEX: 38.96 KG/M2 | OXYGEN SATURATION: 98 % | DIASTOLIC BLOOD PRESSURE: 94 MMHG | WEIGHT: 213 LBS | SYSTOLIC BLOOD PRESSURE: 148 MMHG | HEART RATE: 84 BPM

## 2022-02-17 PROCEDURE — 96372 THER/PROPH/DIAG INJ SC/IM: CPT

## 2022-02-17 PROCEDURE — 99214 OFFICE O/P EST MOD 30 MIN: CPT | Mod: 25

## 2022-02-17 PROCEDURE — 83036 HEMOGLOBIN GLYCOSYLATED A1C: CPT | Mod: QW

## 2022-02-17 RX ORDER — METFORMIN HYDROCHLORIDE 500 MG/1
500 TABLET, COATED ORAL TWICE DAILY
Qty: 180 | Refills: 2 | Status: DISCONTINUED | COMMUNITY
Start: 2021-07-16 | End: 2022-02-17

## 2022-02-17 RX ORDER — DENOSUMAB 60 MG/ML
60 INJECTION SUBCUTANEOUS
Qty: 1 | Refills: 0 | Status: COMPLETED | OUTPATIENT
Start: 2022-02-17

## 2022-02-17 RX ADMIN — DENOSUMAB 60 MG/ML: 60 INJECTION SUBCUTANEOUS at 00:00

## 2022-02-17 NOTE — REASON FOR VISIT
[Follow - Up] : a follow-up visit [DM Type 2] : DM Type 2 [Hypothyroidism] : hypothyroidism [Osteoporosis] : osteoporosis [Family Member] : family member

## 2022-02-18 LAB — HBA1C MFR BLD HPLC: 7.9

## 2022-02-18 NOTE — PHYSICAL EXAM
[Alert] : alert [Well Nourished] : well nourished [No Acute Distress] : no acute distress [Well Developed] : well developed [Normal Sclera/Conjunctiva] : normal sclera/conjunctiva [EOMI] : extra ocular movement intact [No Proptosis] : no proptosis [Thyroid Not Enlarged] : the thyroid was not enlarged [No Thyroid Nodules] : no palpable thyroid nodules [Clear to Auscultation] : lungs were clear to auscultation bilaterally [Normal S1, S2] : normal S1 and S2 [Normal Rate] : heart rate was normal [Regular Rhythm] : with a regular rhythm [No Edema] : no peripheral edema [Pedal Pulses Normal] : the pedal pulses are present [Normal Bowel Sounds] : normal bowel sounds [Not Tender] : non-tender [Not Distended] : not distended [Soft] : abdomen soft [Normal Anterior Cervical Nodes] : no anterior cervical lymphadenopathy [No Spinal Tenderness] : no spinal tenderness [Spine Straight] : spine straight [No Stigmata of Cushings Syndrome] : no stigmata of Cushings Syndrome [Normal Gait] : normal gait [Normal Reflexes] : deep tendon reflexes were 2+ and symmetric [No Tremors] : no tremors [Normal Sensation on Monofilament Testing] : normal sensation on monofilament testing of lower extremities [Oriented x3] : oriented to person, place, and time [de-identified] : skin intact in feet

## 2022-02-18 NOTE — HISTORY OF PRESENT ILLNESS
[Ibandronate (Boniva)] : Ibandronate [Vitamin D (oral)] : Vitamin D orally [Denosumab (Prolia)] : Denosumab [Diabetes] : a history of diabetes [Family History of Osteoporosis] : family history of osteoporosis [FreeTextEntry1] : No significant interval health changes. No interval surgery, hospitalizations, fractures, or change in medications.\par \par Pt started Prolia 8/2021. Tolerating well. No thigh pain, no interval fx. Normal Ca. No recent DDS, full dentures. No ONJ. Not planning major dental work. \par \par Pt states she was told of osteoporosis since 2018. Last BMD 1/2020. She started and is continuing Boniva, from Dr. Denise. Took correctly, tolerated well. 0 falls in the last 6 months. Fh/o osteoporosis in siblings and daughter, no hip fx. H/o no fx. H/o cholecystectomy. No h/o kidney stones or calcium problems.. No ulcers or bleeding. No unusual risks for osteoporosis. No h/o RT. No chronic prednisone use. No h/o Paget's disease. Menopause 32. No HRT use. Up to date with mammography and GYN. Pt takes Vitamin D 1000 IU daily.\par \par Bone mineral density: 1/2020\par Indication: vs.outside study 2017\par Spine: -2.5 osteoporosis, no significant change\par Total hip: -1.1 osteopenia, -5.0%\par Femoral neck: -2.5 osteoporosis, no significant change\par Proximal radius: -3.0 osteoporosis, no significant change\par \par H/o hypothyroidism. Currently on LT4 112 mcg daily increased from 100 mcg for TSH 4.50. No local neck pain. No dysphagia or dysphonia. No raciness, shakiness, or heat/cold intolerance. No palpitations, tremors, or sudden weight gain/loss. Some tiredness and fatigue.\par \par H/o T2DM dx 10+ years ago. Pt was initially on Metformin but had GI symptoms, now on Glipizide, added Ozempic 0.5 mg 7/2021 for diabetes and weight loss, reported nausea but no real change off rx, did not renew Ozempic due to insurance coverage. No weight loss since starting rx. Last A1c 6.6%, then 7.3%. Last opthal within past 6 months. No polyuria or polydipsia.\par \par Elevated lipids, managed by Dr. Denise, managing on diet alone.\par \par Pt had mild COVID 12/2021. No hospitalization, resolved. No residual symptoms. [Low Vit D Intake/Exposure] : no low vitamin D intake [Premat. Menopause (natural)] : no history of premature menopause [Taking Steroids] : no history of taking steroids [Hyperparathyroidism] : no history of hyperparathyroidism [Kidney Stones] : no history of kidney stones [Previous Fragility Fracture] : no previous fragility fracture [Regular Dental Follow-Up] : no regular dental follow-up [Family History of Breast Cancer] : no family history of breast cancer [Family History of Hip Fracture] : no family history of hip fracture [History of Radiation Therapy] : no history of radiation therapy [History of Blood Clots] : no history of blood clots [High Fall Risk] : no fall risk [Frequent Falls] : no frequent falls [Uses a Walker/Cane] : no use of a walker/cane

## 2022-02-18 NOTE — END OF VISIT
[FreeTextEntry3] : I, Tony Paulson, authored this note working as a medical scribe for Dr. Hirsch.  02/17/2022.  1:00PM. This note was authored by the medical scribe for me. I have reviewed, edited, and revised the note as needed. I am in agreement with the exam findings, imaging findings, and treatment plan.  Joce Hirsch MD

## 2022-02-18 NOTE — ASSESSMENT
[Denosumab Therapy] : Risks  and benefits of denosumab therapy were discussed with the patient including eczema, cellulitis, osteonecrosis of the jaw and atypical femur fractures [Diabetic Medications] : Risks and benefits of diabetic medications were discussed [Levothyroxine] : The patient was instructed to take Levothyroxine on an empty stomach, separate from vitamins, and wait at least 30 minutes before eating [FreeTextEntry1] : Ms. PRINCE is a 70 year old female w/ osteoporosis, hypothyroidism, and T2DM\par \par Pt states she was told of osteoporosis since 2018. Last BMD 1/2020. She started and is continuing Boniva, from Dr. Denise. Taking correctly, tolerating well. No interval fx, no UGI sx, no thigh pain. No aches & pains. No heartburn. No ONJ. Pt c/o back pain on day of dose which usually resolves. 0 falls in the last 6 months. Fh/o osteoporosis in siblings and daughter, no hip fx. H/o no fx. No unusual risks for osteoporosis. Outside BMD 1/2020 indicates stable osteoporosis in spine, femoral neck, and proximal radius, and worsened osteopenia in total hip.\par BMD 7/2021 shows progressive bone loss especially severe and proximal radius. This raises concerns about primary secondary hyperparathyroidism. Patient advised for an increased risk of future fx. Options for medical therapy discussed in detail. Pt started Prolia 8/2021. Tolerating well. No thigh pain, no interval fx. Normal Ca. No ONJ. Continue Prolia, from [employer].\par \par H/o hypothyroidism. Chemically and clinically euthyroid on LT4 112 mcg daily. No local neck pain. No dysphagia or dysphonia. No raciness, shakiness, heat/cold intolerance, tiredness, or fatigue. No palpitations, tremors, or sudden weight gain/loss.\par \par H/o T2DM dx 10+ years ago. Pt was initially on Metformin but had GI symptoms, now on Glipizide, added Ozempic 0.5 mg 7/2021 for diabetes and weight loss, reported nausea but no real change off rx, did not renew Ozempic due to insurance coverage. Normal monofilament test. Last A1c 7.3%. 2/2022 A1c 7.9%. Discussed insurance coverage w/ pharmacist Jaylon Augustin. Recommend increased Ozempic to 1 mg weekly. Prescription sent, if insurnace dose not cover increased dose, c/w Ozempic 0.5 mg.\par \par Reviewed the importance of routine podiatry care.\par \par Labs 9/2021 reviewed: Ca 9.4, normal. TSH 2.6, normal. Creatinine 1.21, elevated. LDL 72, normal. Urine microalbumin negative.\par \par F/u in 6 months

## 2022-02-24 ENCOUNTER — APPOINTMENT (OUTPATIENT)
Dept: INTERNAL MEDICINE | Facility: CLINIC | Age: 72
End: 2022-02-24
Payer: MEDICARE

## 2022-02-24 ENCOUNTER — NON-APPOINTMENT (OUTPATIENT)
Age: 72
End: 2022-02-24

## 2022-02-24 VITALS
OXYGEN SATURATION: 95 % | WEIGHT: 212 LBS | DIASTOLIC BLOOD PRESSURE: 74 MMHG | HEIGHT: 62 IN | TEMPERATURE: 97.9 F | SYSTOLIC BLOOD PRESSURE: 118 MMHG | HEART RATE: 70 BPM | BODY MASS INDEX: 39.01 KG/M2

## 2022-02-24 PROCEDURE — G0444 DEPRESSION SCREEN ANNUAL: CPT | Mod: 59

## 2022-02-24 PROCEDURE — 99214 OFFICE O/P EST MOD 30 MIN: CPT | Mod: 25

## 2022-02-24 PROCEDURE — 36415 COLL VENOUS BLD VENIPUNCTURE: CPT

## 2022-02-24 RX ORDER — BLOOD SUGAR DIAGNOSTIC
STRIP MISCELLANEOUS
Qty: 1 | Refills: 6 | Status: ACTIVE | COMMUNITY
Start: 2017-07-13 | End: 1900-01-01

## 2022-02-24 NOTE — ASSESSMENT
[FreeTextEntry1] : Depression/ anxiety - PHQ-9 score -2 resolved  in remission \par -Depression screening done at this visit. 15 minute spent in assessment and review.\par -Denies homicidal or suicidal ideas or thoughts\par -Discussed with patient in detail side effects of all medications, hand out given, advise patient to inform family member that he or she is taking the medication and to watch out for any personality changes, to seek medical attention immediately if they notice any difference. \par -pt deferred appt to see psych/ therapist \par - change setraline 50 QHS \par - f/u 3 month s\par \par High cr 1.6 - repeat today \par \par DM x 2 - \par -AIC 7.3 9/30/21 --> 7.9 2/2022 \par was 6.6 6/2021 \par -- back on glipizide Xl 10 qd and ozympic 0.5 q weekly \par -D/c metformin 1000 BID ( 9/2020 due to diarrhea) pt restarted on Glipizide ER 5 qD\par - Ophthalmologist-consult reviewed no retinopathy 5/21/21 \par - ASCVD risk - high ON atorvastatin to 40 repeat LFT and Lipids 3 months \par - on ASA 81 \par \par IBS / diarrhea could be due to Metformin vs lactose intolerance vs IBS s/p EGD 11/23/2020 - H pylori - s/p treatment 1/4/21 stool test negative \par - GASTRO consult reviewed \par -doing better - symptoms resolved \par - cont probiotics \par \par Elevated liver enzymes, morbid obesity, BMI 38--resolved \par -Acute hepatitis panel negative\par -Discuss weight loss\par \par Small pericardial effusion - new onset 1/2017 when she was admitted for acute bronchitis and sob -saw Cardio DR Chaves on [facility] [address] - did echo , stress test - 1/2017 reviewed.\par -denies CP , + MARK at base line , or palpations, or dizzy spells \par \par Emphysema 1/2017 - smoked 2 PPD for 30 yrs , stopped 3 yrs ago when she had pneumonia , taking inhalers Spiriva and ProAir as needed.\par - CT chest 1/2017 emphysema and nodules small -reviewed , repeat CT chest 8/2017 - reviewed stable.will make apt with pulm \par - MARK at base line , rx for walker with seat given \par - handicap permit signed \par -referral to see pulm given \par \par b12 deficiency - nl - 1000 mcg po daily advised \par  \par Hypothyroidism - x 1 yrs - stable , get tft \par -on levothyroxine 112 mcg daily ,\par \par HTN- 4 ysr -better now 142/78 \par -on Irbesartan HCTZ 300 -12.5 and Amlodipine 5 mg daily \par \par Osteoporosis 6/2017 dEXA - 1/2020 --> 7/2021 dexa worsened - saw endo -started Prolia injections 8/9/21 \par - 0n Calcium 1200 mg daily \par -on vitamin d 1000 units daily \par - do weight bearing exercises: walking with weights, stair climbing , weight training , jogging, aerobics etc \par -repeat test 2 yrs for a f/u\par  \par HCM \par PAP- 2019 as per pt \par colonoscope -4/2018 reviewed - polyp- repeat 5 yrs \par mammo- 11/2021 bi rad 2  \par Bone density -7/2021 - osteoporosis worsened ,followed by endocrinologist on prolia q 6 months since 8/2021\par Prevnar 13 given 5/2017\par Pneumovax -given 6/2018 \par tDAP 9/29/2020\par Hep C Negative\par flu vaccine received in pharmacy -9/30/21 \par shingles advised - pt to check insurance and make apt \par HIV screen - refused \par skin check advised. \par Pfizer 2/20/21 , 3/22/21 - booster 10/16/21\par

## 2022-02-24 NOTE — HEALTH RISK ASSESSMENT
[0] : 2) Feeling down, depressed, or hopeless: Not at all (0) [PHQ-2 Negative - No further assessment needed] : PHQ-2 Negative - No further assessment needed [PHQ-9 Negative - No further assessment needed] : PHQ-9 Negative - No further assessment needed [I have developed a follow-up plan documented below in the note.] : I have developed a follow-up plan documented below in the note. [[id number]] : 0

## 2022-02-24 NOTE — HISTORY OF PRESENT ILLNESS
[de-identified] : f/u on ch medical issues \par \par Lives in [place] \par sees Dr Trevon Berry - got referral from her \par  , 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 \par \par Had covid in Dec 2021 went to [facility] - did doxy and prednisone - got better - BW - showed Cr 1.6 \par \par need referral for podiatrist \par \par Depression - feeling better with sertraline 25 mg -25 % improvement thinks there is room for increasing dose \par \par DM -AIC 7.9 2/2022 \par - was followed by ENDO - Dr Lopez left [facility] needs med renewed ,now seeing Dr Rodriguez \par - back on glipizide Xl 10 qd and ozympic 0.5 q weekly \par -her Glipizide 10 XL daily-was discontinued and was started on metformin and ozympic - could not tolerate metformin again due to stomach issues - she stopped it - ozympic caused her severe nausea - she held it for last 2 weeks feels better - for last few weeks she has not taken any medications for her DM \par -gained weight \par - saw ophtho 2021 no diabetic retinopathy - did cataract surgery rt eye has complications - pain after surgery - unable to tolerate new lenses \par \par diarrhea- Irritable bowel - resolved doing better \par saw GASTRO consult reviewed S/p EGD 11/23/2020- H pylori gastritis s/p Treatment Triple therapy twice daily with food for 10 days - repeat stool test 1/4/21 negative \par -much better now -perfect feels like a Miracle \par \par left shoulder rotator cuff tear - 2017 - better ROM no pain \par -saw ortho was told she is too old for sx , advised physical therapy \par -was admitted 8/11/17 - left shoulder pain ACS was r/o had tap joint r/o septic arthritis , did MRi dx as left shoulder rotator cuff tear \par - saw ortho too 5 days percoset stopped - ortho did not recommend Pt , now better no pain - was advised not to do things above her shoulder , weights etc \par  \par Hypothyroidism- TSH -dose was increased to 112 mcg 7/2021 by endo rx levothyroxine\par \par osteoporosis- did bone density 6/2017 followed by endo \par - was on Ibandronate 150 po monthly - till 6/2021 - dexa 7/2021 -worsening osteoporosis was started on Prolia injections\par -started vit d and calcium 1200 mg \par \par Small pericardial effusion - new onset 1/2017 when she was admitted for acute bronchitis and sob -saw Cardio DR Shankar Barnhart on [facility] - did echo , stress test - 1/2017 \par - on irbesartan HCTZ , still on amlodipine 5 mg \par -he stopped atorvastatin \par \par Emphysema 1/2017 - smoked 2 PPD for 30 yrs , stopped 3 yrs ago when she had pneumonia , taking inhalers Spiriva and ProAir as needed \par -Ct chest 1/2017 emphysema and nodules small \par -Saw pulmonologist consult reviewed- Due for CT scan chest in July- will schedule apt \par -Working up for obstructive sleep apnea \par \par HTN- 4 ysr \par - irbesartan hctz 300 -12.5 and Amlodipine 5 mg daily \par  \par

## 2022-02-25 LAB
ALBUMIN SERPL ELPH-MCNC: 4.5 G/DL
ALP BLD-CCNC: 111 U/L
ALT SERPL-CCNC: 17 U/L
ANION GAP SERPL CALC-SCNC: 17 MMOL/L
AST SERPL-CCNC: 21 U/L
BILIRUB SERPL-MCNC: 0.5 MG/DL
BUN SERPL-MCNC: 33 MG/DL
CALCIUM SERPL-MCNC: 10.2 MG/DL
CHLORIDE SERPL-SCNC: 105 MMOL/L
CHOLEST SERPL-MCNC: 189 MG/DL
CO2 SERPL-SCNC: 16 MMOL/L
CREAT SERPL-MCNC: 1.43 MG/DL
GLUCOSE SERPL-MCNC: 108 MG/DL
HDLC SERPL-MCNC: 46 MG/DL
LDLC SERPL CALC-MCNC: 94 MG/DL
NONHDLC SERPL-MCNC: 144 MG/DL
POTASSIUM SERPL-SCNC: 4.7 MMOL/L
PROT SERPL-MCNC: 7.2 G/DL
SODIUM SERPL-SCNC: 139 MMOL/L
TRIGL SERPL-MCNC: 247 MG/DL

## 2022-03-15 ENCOUNTER — RX RENEWAL (OUTPATIENT)
Age: 72
End: 2022-03-15

## 2022-03-28 ENCOUNTER — RX RENEWAL (OUTPATIENT)
Age: 72
End: 2022-03-28

## 2022-05-22 ENCOUNTER — RX RENEWAL (OUTPATIENT)
Age: 72
End: 2022-05-22

## 2022-05-26 ENCOUNTER — APPOINTMENT (OUTPATIENT)
Dept: INTERNAL MEDICINE | Facility: CLINIC | Age: 72
End: 2022-05-26
Payer: MEDICARE

## 2022-05-26 ENCOUNTER — RX RENEWAL (OUTPATIENT)
Age: 72
End: 2022-05-26

## 2022-05-26 VITALS
TEMPERATURE: 98.6 F | HEART RATE: 82 BPM | DIASTOLIC BLOOD PRESSURE: 77 MMHG | WEIGHT: 215 LBS | BODY MASS INDEX: 39.32 KG/M2 | SYSTOLIC BLOOD PRESSURE: 136 MMHG | OXYGEN SATURATION: 98 %

## 2022-05-26 DIAGNOSIS — H91.93 UNSPECIFIED HEARING LOSS, BILATERAL: ICD-10-CM

## 2022-05-26 PROCEDURE — 36415 COLL VENOUS BLD VENIPUNCTURE: CPT

## 2022-05-26 PROCEDURE — 83036 HEMOGLOBIN GLYCOSYLATED A1C: CPT | Mod: QW

## 2022-05-26 PROCEDURE — 99214 OFFICE O/P EST MOD 30 MIN: CPT | Mod: 25

## 2022-05-26 NOTE — HEALTH RISK ASSESSMENT
[Several Days (1)] : 4.) Feeling tired or having little energy? Several days [Not at All (0)] : 9.) Thoughts that you would be off dead or of hurting yourself in some way? Not at all [PHQ-9 Negative - No further assessment needed] : PHQ-9 Negative - No further assessment needed [[id number]] : 2

## 2022-05-26 NOTE — HISTORY OF PRESENT ILLNESS
[Other: _____] : [unfilled] [de-identified] : f/u on ch medical issues \par \par Lives in [place] \par sees Dr Trevon Berry - got referral from her \par  , 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 \par \par had mild cold today did home covid test neg - no fever just congestion \par \par Had covid in Dec 2021 went to [facility] - did doxy and prednisone - got better - BW - showed Cr 1.6 \par \par Depression - feeling better with sertraline 50  mg feel good improvement - sleeping well at night - tolerating without side effects \par - gained weight 3 lbs since 2/2022 \par \par DM -AIC 7.9 2/2022 \par - was followed by ENDO - Dr Lopez left [facility] med renewed ,now seeing Dr Rodriguez \par - back on glipizide Xl 10 qd and ozympic 0.75 q weekly recently \par -her Glipizide 10 XL daily-was discontinued and was started on metformin and ozympic - could not tolerate metformin again due to stomach issues - she stopped it - ozympic caused her severe nausea - she held it for last 2 weeks feels better - for last few weeks she has not taken any medications for her DM \par -gained weight \par - saw ophtho 7/2021 no diabetic retinopathy - did cataract surgery rt eye has complications - pain after surgery - unable to tolerate new lenses \par \par diarrhea- Irritable bowel - resolved doing better \par saw GASTRO consult reviewed S/p EGD 11/23/2020- H pylori gastritis s/p Treatment Triple therapy twice daily with food for 10 days - repeat stool test 1/4/21 negative \par -much better now -perfect feels like a Miracle \par \par left shoulder rotator cuff tear - 2017 - better ROM no pain \par -saw ortho was told she is too old for sx , advised physical therapy \par -was admitted 8/11/17 - left shoulder pain ACS was r/o had tap joint r/o septic arthritis , did MRi dx as left shoulder rotator cuff tear \par - saw ortho too 5 days percoset stopped - ortho did not recommend Pt , now better no pain - was advised not to do things above her shoulder , weights etc \par  \par Hypothyroidism- TSH -dose was increased to 112 mcg 7/2021 by endo rx levothyroxine\par \par osteoporosis- did bone density 6/2017 followed by endo \par - was on Ibandronate 150 po monthly - till 6/2021 - dexa 7/2021 -worsening osteoporosis was started on Prolia injections\par -started vit d and calcium 1200 mg \par \par Small pericardial effusion - new onset 1/2017 when she was admitted for acute bronchitis and sob -saw Cardio DR Shankar Barnhart on [facility] [address] - did echo , stress test - 1/2017 \par - on irbesartan HCTZ , still on amlodipine 5 mg \par -he stopped atorvastatin \par \par Emphysema 1/2017 - smoked 2 PPD for 30 yrs , stopped 3 yrs ago when she had pneumonia , taking inhalers Spiriva and ProAir as needed \par -Ct chest 1/2017 emphysema and nodules small \par -Saw pulmonologist consult reviewed- Due for CT scan chest in July- will schedule apt \par -Working up for obstructive sleep apnea \par \par HTN- 4 ysr \par - irbesartan hctz 300 -12.5 and Amlodipine 5 mg daily \par  \par

## 2022-05-26 NOTE — ASSESSMENT
[FreeTextEntry1] : Depression/ anxiety - PHQ-9 score -2 resolved in remission \par -Depression screening done at this visit. 15 minute spent in assessment and review.\par -Denies homicidal or suicidal ideas or thoughts\par -Discussed with patient in detail side effects of all medications, hand out given, advise patient to inform family member that he or she is taking the medication and to watch out for any personality changes, to seek medical attention immediately if they notice any difference. \par -pt deferred appt to see psych/ therapist \par - change setraline 50 QHS \par - f/u 3 month s\par \par High cr 1.4 2/2022  - repeat today \par \par DM x 2 - \par -AIC 7.3 9/30/21 --> 7.9 2/2022 --. poc AIc - 5/26/22- 6.5\par  -- back on glipizide Xl 10 qd and ozympic 0.75 q weekly \par -D/c metformin 1000 BID ( 9/2020 due to diarrhea) pt restarted on Glipizide ER 5 qD\par - Ophthalmologist-consult reviewed no retinopathy 5/21/21 \par - ASCVD risk - 24.34% high ON atorvastatin to 40 lipids 2/2022 nl \par - on ASA 81 \par \par IBS / diarrhea could be due to Metformin vs lactose intolerance vs IBS s/p EGD 11/23/2020 - H pylori - s/p treatment 1/4/21 stool test negative \par - GASTRO consult reviewed \par -doing better - symptoms resolved \par - cont probiotics \par \par Elevated liver enzymes, morbid obesity, BMI 38--resolved \par -Acute hepatitis panel negative\par -Discuss weight loss\par \par Small pericardial effusion - new onset 1/2017 when she was admitted for acute bronchitis and sob -saw Cardio DR Chaves on [facility] - did echo , stress test - 1/2017 reviewed.\par -denies CP , + MARK at base line , or palpations, or dizzy spells \par \par Emphysema 1/2017 - smoked 2 PPD for 30 yrs , stopped 3 yrs ago when she had pneumonia , taking inhalers Spiriva and ProAir as needed.\par - CT chest 1/2017 emphysema and nodules small -reviewed , repeat CT chest 8/2017 - reviewed stable.will make apt with pulm \par - MARK at base line , rx for walker with seat given \par - handicap permit signed \par -referral to see pulm given \par \par b12 deficiency - nl - 1000 mcg po daily advised \par  \par Hypothyroidism - x 1 yrs - stable , get tft \par -on levothyroxine 112 mcg daily ,\par \par HTN- 4 ysr -better now 128/70\par -on Irbesartan HCTZ 300 -12.5 and Amlodipine 5 mg daily \par \par Osteoporosis 6/2017 dEXA - 1/2020 --> 7/2021 dexa worsened - saw endo -started Prolia injections 8/9/21 \par - 0n Calcium 1200 mg daily \par -on vitamin d 1000 units daily \par - do weight bearing exercises: walking with weights, stair climbing , weight training , jogging, aerobics etc \par -repeat test 2 yrs for a f/u\par  \par HCM \par PAP- 2019 as per pt \par colonoscope -4/2018 reviewed - polyp- repeat 5 yrs 2023\par mammo- 11/2021 bi rad 2 \par Bone density -7/2021 - osteoporosis worsened ,followed by endocrinologist on prolia q 6 months since 8/2021\par Prevnar 13 given 5/2017\par Pneumovax -given 6/2018 \par tDAP 9/29/2020\par Hep C Negative\par flu vaccine received in pharmacy -9/30/21 \par shingles advised - pt to check insurance and make apt \par HIV screen - refused \par skin check advised. \par Pfizer 2/20/21 , 3/22/21 - booster 10/16/21\par . \par

## 2022-05-27 LAB
ANION GAP SERPL CALC-SCNC: 17 MMOL/L
BUN SERPL-MCNC: 32 MG/DL
CALCIUM SERPL-MCNC: 9.4 MG/DL
CHLORIDE SERPL-SCNC: 106 MMOL/L
CO2 SERPL-SCNC: 18 MMOL/L
CREAT SERPL-MCNC: 1.33 MG/DL
EGFR: 43 ML/MIN/1.73M2
GLUCOSE SERPL-MCNC: 150 MG/DL
POTASSIUM SERPL-SCNC: 5.1 MMOL/L
SODIUM SERPL-SCNC: 141 MMOL/L

## 2022-06-06 ENCOUNTER — NON-APPOINTMENT (OUTPATIENT)
Age: 72
End: 2022-06-06

## 2022-09-01 ENCOUNTER — APPOINTMENT (OUTPATIENT)
Dept: ENDOCRINOLOGY | Facility: CLINIC | Age: 72
End: 2022-09-01

## 2022-09-01 PROCEDURE — 96372 THER/PROPH/DIAG INJ SC/IM: CPT

## 2022-09-01 RX ORDER — DENOSUMAB 60 MG/ML
60 INJECTION SUBCUTANEOUS
Qty: 1 | Refills: 0 | Status: COMPLETED | OUTPATIENT
Start: 2022-08-26

## 2022-09-29 ENCOUNTER — RX RENEWAL (OUTPATIENT)
Age: 72
End: 2022-09-29

## 2022-09-29 ENCOUNTER — LABORATORY RESULT (OUTPATIENT)
Age: 72
End: 2022-09-29

## 2022-10-13 ENCOUNTER — APPOINTMENT (OUTPATIENT)
Dept: INTERNAL MEDICINE | Facility: CLINIC | Age: 72
End: 2022-10-13

## 2022-10-13 VITALS
TEMPERATURE: 98.1 F | BODY MASS INDEX: 41.22 KG/M2 | SYSTOLIC BLOOD PRESSURE: 129 MMHG | DIASTOLIC BLOOD PRESSURE: 87 MMHG | HEART RATE: 76 BPM | WEIGHT: 224 LBS | OXYGEN SATURATION: 98 % | HEIGHT: 62 IN

## 2022-10-13 VITALS — SYSTOLIC BLOOD PRESSURE: 129 MMHG | DIASTOLIC BLOOD PRESSURE: 87 MMHG

## 2022-10-13 DIAGNOSIS — Z23 ENCOUNTER FOR IMMUNIZATION: ICD-10-CM

## 2022-10-13 LAB
25(OH)D3 SERPL-MCNC: 39.8 NG/ML
ALBUMIN SERPL ELPH-MCNC: 4.7 G/DL
ALP BLD-CCNC: 116 U/L
ALT SERPL-CCNC: 25 U/L
ANION GAP SERPL CALC-SCNC: 18 MMOL/L
APPEARANCE: ABNORMAL
AST SERPL-CCNC: 27 U/L
BASOPHILS # BLD AUTO: 0.07 K/UL
BASOPHILS NFR BLD AUTO: 0.9 %
BILIRUB SERPL-MCNC: 0.5 MG/DL
BILIRUBIN URINE: NEGATIVE
BLOOD URINE: NEGATIVE
BUN SERPL-MCNC: 39 MG/DL
CALCIUM SERPL-MCNC: 9.5 MG/DL
CHLORIDE SERPL-SCNC: 101 MMOL/L
CHOLEST SERPL-MCNC: 175 MG/DL
CO2 SERPL-SCNC: 19 MMOL/L
COLOR: NORMAL
CREAT SERPL-MCNC: 1.62 MG/DL
CREAT SPEC-SCNC: 151 MG/DL
EGFR: 33 ML/MIN/1.73M2
EOSINOPHIL # BLD AUTO: 0.15 K/UL
EOSINOPHIL NFR BLD AUTO: 1.8 %
ESTIMATED AVERAGE GLUCOSE: 148 MG/DL
GLUCOSE QUALITATIVE U: NEGATIVE
GLUCOSE SERPL-MCNC: 166 MG/DL
HBA1C MFR BLD HPLC: 6.8 %
HCT VFR BLD CALC: 41.4 %
HDLC SERPL-MCNC: 58 MG/DL
HGB BLD-MCNC: 13.2 G/DL
IMM GRANULOCYTES NFR BLD AUTO: 0.2 %
KETONES URINE: NEGATIVE
LDLC SERPL CALC-MCNC: 78 MG/DL
LEUKOCYTE ESTERASE URINE: ABNORMAL
LYMPHOCYTES # BLD AUTO: 2.58 K/UL
LYMPHOCYTES NFR BLD AUTO: 31.4 %
MAN DIFF?: NORMAL
MCHC RBC-ENTMCNC: 28.7 PG
MCHC RBC-ENTMCNC: 31.9 GM/DL
MCV RBC AUTO: 90 FL
MICROALBUMIN 24H UR DL<=1MG/L-MCNC: 4.1 MG/DL
MICROALBUMIN/CREAT 24H UR-RTO: 27 MG/G
MONOCYTES # BLD AUTO: 0.93 K/UL
MONOCYTES NFR BLD AUTO: 11.3 %
NEUTROPHILS # BLD AUTO: 4.47 K/UL
NEUTROPHILS NFR BLD AUTO: 54.4 %
NITRITE URINE: NEGATIVE
NONHDLC SERPL-MCNC: 118 MG/DL
PH URINE: 6
PLATELET # BLD AUTO: 216 K/UL
POTASSIUM SERPL-SCNC: 4.6 MMOL/L
PROT SERPL-MCNC: 7.6 G/DL
PROTEIN URINE: NORMAL
RBC # BLD: 4.6 M/UL
RBC # FLD: 14.6 %
SODIUM SERPL-SCNC: 138 MMOL/L
SPECIFIC GRAVITY URINE: 1.02
TRIGL SERPL-MCNC: 196 MG/DL
TSH SERPL-ACNC: 5.74 UIU/ML
UROBILINOGEN URINE: NORMAL
VIT B12 SERPL-MCNC: 380 PG/ML
WBC # FLD AUTO: 8.22 K/UL

## 2022-10-13 PROCEDURE — 96372 THER/PROPH/DIAG INJ SC/IM: CPT

## 2022-10-13 PROCEDURE — G0008: CPT

## 2022-10-13 PROCEDURE — 90662 IIV NO PRSV INCREASED AG IM: CPT

## 2022-10-13 PROCEDURE — G0439: CPT

## 2022-10-13 RX ORDER — CYANOCOBALAMIN 1000 UG/ML
1000 INJECTION INTRAMUSCULAR; SUBCUTANEOUS
Qty: 0 | Refills: 0 | Status: COMPLETED | OUTPATIENT
Start: 2022-10-13

## 2022-10-13 RX ADMIN — CYANOCOBALAMIN 0 MCG/ML: 1000 INJECTION INTRAMUSCULAR; SUBCUTANEOUS at 00:00

## 2022-10-13 NOTE — ASSESSMENT
[FreeTextEntry1] : High cr 1.4 2/2022 ->1.33 5/2022 --. 1.6 9/29/22   repeat today \par - referral to see nephro given \par \par Depression/ anxiety - PHQ-9 score -2 resolved in remission \par -Depression screening done at this visit. 15 minute spent in assessment and review.\par -Denies homicidal or suicidal ideas or thoughts\par -Discussed with patient in detail side effects of all medications, hand out given, advise patient to inform family member that he or she is taking the medication and to watch out for any personality changes, to seek medical attention immediately if they notice any difference. \par -pt deferred appt to see psych/ therapist \par - cont setraline 50 QHS \par - f/u 3 month s\par \par DM x 2 - \par -AIC 6.8 9/2022 \par  -- back on glipizide Xl 10 qd and ozympic 0.75 q weekly --> 1 mg q weekly - seeing ENDO - has appt 3/8/23 \par -D/c metformin 1000 BID ( 9/2020 due to diarrhea) pt restarted on Glipizide ER 5 qD\par - Ophthalmologist-consult reviewed no retinopathy 5/21/21 \par - ASCVD risk - 24.34% high ON atorvastatin to 40 lipids 2/2022 nl \par - on ASA 81 \par \par IBS / diarrhea could be due to Metformin vs lactose intolerance vs IBS s/p EGD 11/23/2020 - H pylori - s/p treatment 1/4/21 stool test negative \par - GASTRO consult reviewed \par -doing better - symptoms resolved \par - cont probiotics \par \par  morbid obesity, BMI 38--> 40 \par -Discuss weight loss\par \par Small pericardial effusion - new onset 1/2017 when she was admitted for acute bronchitis and sob -saw Cardio DR Chaves on [facility] [address] - did echo , stress test - 1/2017 reviewed.\par -denies CP , + MARK at base line , or palpations, or dizzy spells \par \par Emphysema 1/2017 - smoked 2 PPD for 30 yrs , stopped 3 yrs ago when she had pneumonia , taking inhalers Spiriva and ProAir as needed.\par - CT chest 1/2017 emphysema and nodules small -reviewed , repeat CT chest 8/2017 - reviewed stable.will make apt with pulm \par - MARK at base line , rx for walker with seat given \par - handicap permit signed \par -referral to see pulm given \par \par b12 deficiency - B12 shot today - take 1000 mcg po daily advised \par  \par Hypothyroidism - x TFT elevated 9/2022 change  levothyroxine 125 mcg daily ,\par \par HTN- 4 ysr -better now 128/70\par -on Irbesartan HCTZ 300 -12.5 and Amlodipine 5 mg daily \par \par Osteoporosis 6/2017 dEXA - 1/2020 --> 7/2021 dexa worsened - saw endo -started Prolia injections 8/9/21 q 6 months \par - 0n Calcium 1200 mg daily \par -on vitamin d 1000 units daily \par - do weight bearing exercises: walking with weights, stair climbing , weight training , jogging, aerobics etc \par -repeat test 2 yrs for a f/u\par  \par HCM \par PAP- 2019 as per pt - gyn referral given \par colonoscope -4/2018 reviewed - polyp- repeat 5 yrs 2023\par mammo- 11/2021 bi rad 2 \par Bone density -7/2021 - osteoporosis worsened ,followed by endocrinologist on prolia q 6 months since 8/2021\par Prevnar 13 given 5/2017\par Pneumovax -given 6/2018 \par tDAP 9/29/2020\par Hep C Negative\par flu vaccine - 10/13/22 \par shingles advised - pt to check insurance and make apt \par HIV screen - refused \par skin check - dermatology referral given \par Pfizer 2/20/21 , 3/22/21 - booster 10/16/21\par

## 2022-10-13 NOTE — HEALTH RISK ASSESSMENT
[Good] : ~his/her~  mood as  good [Never] : Never [Yes] : Yes [2 - 4 times a month (2 pts)] : 2-4 times a month (2 points) [1 or 2 (0 pts)] : 1 or 2 (0 points) [Never (0 pts)] : Never (0 points) [No falls in past year] : Patient reported no falls in the past year [0] : 2) Feeling down, depressed, or hopeless: Not at all (0) [PHQ-2 Negative - No further assessment needed] : PHQ-2 Negative - No further assessment needed [With Significant Other] : lives with significant other [Employed] : employed [] :  [Fully functional (bathing, dressing, toileting, transferring, walking, feeding)] : Fully functional (bathing, dressing, toileting, transferring, walking, feeding) [Fully functional (using the telephone, shopping, preparing meals, housekeeping, doing laundry, using] : Fully functional and needs no help or supervision to perform IADLs (using the telephone, shopping, preparing meals, housekeeping, doing laundry, using transportation, managing medications and managing finances) [With Patient/Caregiver] : , with patient/caregiver [Designated Healthcare Proxy] : Designated healthcare proxy [Name: ___] : Health Care Proxy's Name: [unfilled]  [Relationship: ___] : Relationship: [unfilled] [Audit-CScore] : 2 [de-identified] : walking , weight on legs  [[id number]] : 0 [Reports changes in hearing] : Reports no changes in hearing [Reports changes in vision] : Reports no changes in vision [Reports changes in dental health] : Reports no changes in dental health [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

## 2022-10-13 NOTE — HISTORY OF PRESENT ILLNESS
[Other: _____] : [unfilled] [de-identified] : seen today for CPE \par \par Lives in [place] \par sees Dr Trevon Berry gyn- got referral from her \par  , 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 \par \par \par Depression - feeling better with sertraline 50 mg feel good improvement - sleeping well at night - tolerating without side effects \par - gained weight  since 2/2022 \par \par DM -AIC 6.8 9/2022 \par - was followed by ENDO -now seeing Dr Rodriguez \par - back on glipizide Xl 10 qd and ozympic 1 q weekly since 5 months \par -her Glipizide 10 XL daily-was discontinued and was started on metformin and ozympic - could not tolerate metformin again due to stomach issues - she stopped it - ozympic caused her severe nausea - she held it for last 2 weeks feels better - for last few weeks she has not taken any medications for her DM \par -gained weight \par - saw ophtho 7/2021 no diabetic retinopathy - did cataract surgery rt eye has complications - pain after surgery - unable to tolerate new lenses \par \par diarrhea- Irritable bowel - resolved doing better \par saw GASTRO consult reviewed S/p EGD 11/23/2020- H pylori gastritis s/p Treatment Triple therapy twice daily with food for 10 days - repeat stool test 1/4/21 negative \par -much better now -perfect feels like a Miracle \par \par left shoulder rotator cuff tear - 2017 - better ROM no pain \par -saw ortho was told she is too old for sx , advised physical therapy \par -was admitted 8/11/17 - left shoulder pain ACS was r/o had tap joint r/o septic arthritis , did MRi dx as left shoulder rotator cuff tear \par - saw ortho too 5 days percoset stopped - ortho did not recommend Pt , now better no pain - was advised not to do things above her shoulder , weights etc \par  \par Hypothyroidism- TSH -dose was increased to 112 mcg 7/2021 by endo TFT high 9/2022 needs new  rx levothyroxine\par \par osteoporosis- did bone density 6/2017 followed by endo \par - was on Ibandronate 150 po monthly - till 6/2021 - dexa 7/2021 -worsening osteoporosis was started on Prolia injections q 6 months \par -started vit d and calcium 1200 mg \par \par Small pericardial effusion - new onset 1/2017 when she was admitted for acute bronchitis and sob -saw Cardio DR Shankar Barnhart on [facility] [address] - did echo , stress test - 1/2017 \par - on irbesartan HCTZ , still on amlodipine 5 mg \par -he stopped atorvastatin \par \par Emphysema 1/2017 - smoked 2 PPD for 30 yrs , stopped 3 yrs ago when she had pneumonia , taking inhalers Spiriva and ProAir as needed \par -Ct chest 1/2017 emphysema and nodules small \par -Saw pulmonologist consult reviewed- Due for CT scan chest in July- will schedule apt \par -Working up for obstructive sleep apnea \par \par HTN- 4 ysr \par - irbesartan hctz 300 -12.5 and Amlodipine 5 mg daily \par  \par

## 2022-10-14 LAB
ANION GAP SERPL CALC-SCNC: 14 MMOL/L
BUN SERPL-MCNC: 29 MG/DL
CALCIUM SERPL-MCNC: 9 MG/DL
CHLORIDE SERPL-SCNC: 108 MMOL/L
CO2 SERPL-SCNC: 18 MMOL/L
CREAT SERPL-MCNC: 1.24 MG/DL
EGFR: 46 ML/MIN/1.73M2
GLUCOSE SERPL-MCNC: 128 MG/DL
POTASSIUM SERPL-SCNC: 4.8 MMOL/L
SODIUM SERPL-SCNC: 139 MMOL/L

## 2022-10-27 ENCOUNTER — APPOINTMENT (OUTPATIENT)
Dept: ENDOCRINOLOGY | Facility: CLINIC | Age: 72
End: 2022-10-27

## 2022-10-27 VITALS
HEART RATE: 87 BPM | BODY MASS INDEX: 39.38 KG/M2 | OXYGEN SATURATION: 97 % | DIASTOLIC BLOOD PRESSURE: 78 MMHG | SYSTOLIC BLOOD PRESSURE: 134 MMHG | HEIGHT: 62 IN | TEMPERATURE: 97.2 F | WEIGHT: 214 LBS

## 2022-10-27 PROCEDURE — 99214 OFFICE O/P EST MOD 30 MIN: CPT

## 2022-10-27 RX ORDER — SEMAGLUTIDE 1.34 MG/ML
2 INJECTION, SOLUTION SUBCUTANEOUS
Qty: 3 | Refills: 1 | Status: DISCONTINUED | COMMUNITY
Start: 2021-08-09 | End: 2022-10-27

## 2022-10-27 RX ORDER — SEMAGLUTIDE 2.68 MG/ML
8 INJECTION, SOLUTION SUBCUTANEOUS
Qty: 3 | Refills: 2 | Status: ACTIVE | COMMUNITY
Start: 2022-02-17

## 2022-10-27 NOTE — PHYSICAL EXAM
[Alert] : alert [Well Nourished] : well nourished [No Acute Distress] : no acute distress [Well Developed] : well developed [Normal Sclera/Conjunctiva] : normal sclera/conjunctiva [EOMI] : extra ocular movement intact [No Proptosis] : no proptosis [Thyroid Not Enlarged] : the thyroid was not enlarged [No Thyroid Nodules] : no palpable thyroid nodules [Clear to Auscultation] : lungs were clear to auscultation bilaterally [Normal S1, S2] : normal S1 and S2 [Normal Rate] : heart rate was normal [Regular Rhythm] : with a regular rhythm [No Edema] : no peripheral edema [Pedal Pulses Normal] : the pedal pulses are present [Normal Bowel Sounds] : normal bowel sounds [Not Tender] : non-tender [Not Distended] : not distended [Soft] : abdomen soft [Normal Anterior Cervical Nodes] : no anterior cervical lymphadenopathy [No Spinal Tenderness] : no spinal tenderness [Spine Straight] : spine straight [No Stigmata of Cushings Syndrome] : no stigmata of Cushings Syndrome [Normal Gait] : normal gait [Normal Reflexes] : deep tendon reflexes were 2+ and symmetric [No Tremors] : no tremors [Normal Sensation on Monofilament Testing] : normal sensation on monofilament testing of lower extremities [Oriented x3] : oriented to person, place, and time [de-identified] : skin intact in feet

## 2022-10-27 NOTE — HISTORY OF PRESENT ILLNESS
[Ibandronate (Boniva)] : Ibandronate [Vitamin D (oral)] : Vitamin D orally [Denosumab (Prolia)] : Denosumab [Diabetes] : a history of diabetes [Family History of Osteoporosis] : family history of osteoporosis [FreeTextEntry1] : Ms. PRINCE is a 72 year old female w/ osteoporosis, hypothyroidism, and T2DM. She presents today for a follow up appointment for management of type 2 DM. \par \par A1c 6.8% 10/13/22\par \par Osteoporosis History:\par No significant interval health changes. No interval surgery, hospitalizations, fractures.\par Pt started Prolia 8/2021. Tolerating well. No thigh pain, no interval fx. Normal Ca. No recent DDS, full dentures. No ONJ. Not planning major dental work. \par Pt states she was told of osteoporosis since 2018. Last BMD 1/2020. She started and is continuing Boniva, from Dr. Denise. Took correctly, tolerated well. 0 falls in the last 6 months. Fh/o osteoporosis in siblings and daughter, no hip fx. H/o no fx. H/o cholecystectomy. No h/o kidney stones or calcium problems.. No ulcers or bleeding. No unusual risks for osteoporosis. No h/o RT. No chronic prednisone use. No h/o Paget's disease. Menopause 32. No HRT use. Up to date with mammography and GYN. Pt takes Vitamin D 1000 IU daily.\par \par Bone mineral density: 1/2020\par Indication: vs.outside study 2017\par Spine: -2.5 osteoporosis, no significant change\par Total hip: -1.1 osteopenia, -5.0%\par Femoral neck: -2.5 osteoporosis, no significant change\par Proximal radius: -3.0 osteoporosis, no significant change\par \par H/o hypothyroidism. Currently on LT4 125 mcg daily increased from 112 mcg for TSH 5.74. No local neck pain. No dysphagia or dysphonia. No raciness, shakiness, or heat/cold intolerance. No palpitations, tremors, or sudden weight gain/loss. Some tiredness and fatigue.\par \par H/o T2DM dx 10+ years ago. Pt was initially on Metformin but had GI symptoms, now on Glipizide, added Ozempic 0.5 mg 7/2021 for diabetes and weight loss, reported nausea but no real change off rx, did not renew Ozempic due to insurance coverage. Ozempic was re-started and now current dose is 1mg weekly. No weight loss since starting rx. Last A1c 6.6%, then 7.3%. Last opthal within past 6 months. No polyuria or polydipsia.\par \par Elevated lipids, managed by Dr. Denise, managing on diet alone.\par Pt had mild COVID 12/2021. No hospitalization, resolved. No residual symptoms.\par \par 10/27/22:\par Patient presents for DM Management: A1c 6.8% 9/29/2022\par Meter Readings: AM  readings: 150, 180s, 170s. No meter today. \par Glipizide ER 10 mg daily  and Ozempic 1 mg weekly. \par Diet: She states she was out of control eating chinese food. Now she has made some lifestyle changes. \par Just started walking. \par Thyroid hormone adjusted to 125 mcg daily.  [Low Vit D Intake/Exposure] : no low vitamin D intake [Premat. Menopause (natural)] : no history of premature menopause [Taking Steroids] : no history of taking steroids [Hyperparathyroidism] : no history of hyperparathyroidism [Kidney Stones] : no history of kidney stones [Previous Fragility Fracture] : no previous fragility fracture [Regular Dental Follow-Up] : no regular dental follow-up [Family History of Breast Cancer] : no family history of breast cancer [Family History of Hip Fracture] : no family history of hip fracture [History of Radiation Therapy] : no history of radiation therapy [History of Blood Clots] : no history of blood clots [High Fall Risk] : no fall risk [Frequent Falls] : no frequent falls [Uses a Walker/Cane] : no use of a walker/cane

## 2022-10-27 NOTE — ASSESSMENT
[Denosumab Therapy] : Risks  and benefits of denosumab therapy were discussed with the patient including eczema, cellulitis, osteonecrosis of the jaw and atypical femur fractures [Diabetic Medications] : Risks and benefits of diabetic medications were discussed [Levothyroxine] : The patient was instructed to take Levothyroxine on an empty stomach, separate from vitamins, and wait at least 30 minutes before eating [FreeTextEntry1] : Ms. PRINCE is a 72 year old female w/ osteoporosis, hypothyroidism, and T2DM. She presents today for a follow up appointment for management of type 2 DM. \par \par type 2 DM:\par A1c 6.8% 10/13/22\par \par Plan:\par Increase Ozempic to 2 mg weekly- advised okay to take 1 mg injections back to back for total dose of 2 mg at one time. \par Continue Glipizide ER 10 mg daily\par \par Osteoporosis:\par Pt states she was told of osteoporosis since 2018. Last BMD 1/2020. She started and is continuing Boniva, from Dr. Denise. Taking correctly, tolerating well. No interval fx, no UGI sx, no thigh pain. No aches & pains. No heartburn. No ONJ. Pt c/o back pain on day of dose which usually resolves. 0 falls in the last 6 months. Fh/o osteoporosis in siblings and daughter, no hip fx. H/o no fx. No unusual risks for osteoporosis. Outside BMD 1/2020 indicates stable osteoporosis in spine, femoral neck, and proximal radius, and worsened osteopenia in total hip.\par BMD 7/2021 shows progressive bone loss especially severe and proximal radius. This raises concerns about primary secondary hyperparathyroidism. Patient advised for an increased risk of future fx. Options for medical therapy discussed in detail. Pt started Prolia 8/2021. Tolerating well. No thigh pain, no interval fx. Normal Ca. No ONJ. Continue Prolia, from [employer].\par \par Hypothyroidism:\par   LT4 125 mcg daily. No local neck pain. No dysphagia or dysphonia. No raciness, shakiness, heat/cold intolerance, tiredness, or fatigue. No palpitations, tremors, or sudden weight gain/loss.\par \par Reviewed the importance of routine podiatry care.\par \par Follow up March 2023, Dr. Hirsch. \par RT Laura for nutrition.

## 2022-11-17 ENCOUNTER — APPOINTMENT (OUTPATIENT)
Dept: OBGYN | Facility: CLINIC | Age: 72
End: 2022-11-17

## 2022-11-17 ENCOUNTER — RESULT REVIEW (OUTPATIENT)
Age: 72
End: 2022-11-17

## 2022-11-17 PROCEDURE — G0101: CPT

## 2022-11-17 PROCEDURE — 99213 OFFICE O/P EST LOW 20 MIN: CPT | Mod: 25

## 2022-11-17 PROCEDURE — 81002 URINALYSIS NONAUTO W/O SCOPE: CPT

## 2022-12-06 ENCOUNTER — RX RENEWAL (OUTPATIENT)
Age: 72
End: 2022-12-06

## 2022-12-22 ENCOUNTER — APPOINTMENT (OUTPATIENT)
Dept: NEPHROLOGY | Facility: CLINIC | Age: 72
End: 2022-12-22

## 2022-12-22 VITALS
SYSTOLIC BLOOD PRESSURE: 136 MMHG | DIASTOLIC BLOOD PRESSURE: 77 MMHG | TEMPERATURE: 97 F | BODY MASS INDEX: 39.93 KG/M2 | HEART RATE: 91 BPM | HEIGHT: 62 IN | WEIGHT: 217 LBS | OXYGEN SATURATION: 94 % | RESPIRATION RATE: 15 BRPM

## 2022-12-22 VITALS — DIASTOLIC BLOOD PRESSURE: 60 MMHG | SYSTOLIC BLOOD PRESSURE: 110 MMHG

## 2022-12-22 PROCEDURE — 99204 OFFICE O/P NEW MOD 45 MIN: CPT | Mod: GC

## 2022-12-22 RX ORDER — CHOLECALCIFEROL (VITAMIN D3) 25 MCG
25 MCG TABLET ORAL
Refills: 0 | Status: DISCONTINUED | COMMUNITY
Start: 2018-06-21 | End: 2022-12-22

## 2022-12-22 NOTE — HISTORY OF PRESENT ILLNESS
[FreeTextEntry1] : 72-year-old female with history of longstanding DM (>10 years), HTN (>10 years), hypothyroidism, COPD and osteoporosis who came to the clinic for the initial evaluation of CKD.  \par \par Kidney History: On review of [facility], pt. noted to have elevated Scr level of 1.38 on 11/30/17. Scr was elevated at 1.52 on 9/29/20. Scr improved/stable at 1.21 on 9/30/21. Scr however was elevated at 1.62 on 9/29/22. UA done on 9/29/22 showed trace proteinuria but no hematuria. UACR was WNL on labs done on 9/29/22. HBsAg and Hep C core Ab were negative on 5/14/17. Pt. denies previous history of kidney disease or kidney stones. No history of kidney disease in family. Denies recent use of NSAIDs, herbal meds or OTC recently. Last Scr was 1.24 on labs done on 10/13/22. Pt says she recently saw her OBGYN doctor and was told she has urinary incontinence and was referred to a urologist. \par \par Pt. currently feels well, however complaints of chronic SOB on exertion. Pt denies any fever, chills, chest pain, nausea,HA, dysuria, weakness during clinic visit today.

## 2022-12-22 NOTE — PHYSICAL EXAM
See Scanned Documents.   [General Appearance - Alert] : alert [General Appearance - Well Nourished] : well nourished [General Appearance - Well Developed] : well developed [Sclera] : the sclera and conjunctiva were normal [Outer Ear] : the ears and nose were normal in appearance [Neck Appearance] : the appearance of the neck was normal [Jugular Venous Distention Increased] : there was no jugular-venous distention [] : no respiratory distress [Respiration, Rhythm And Depth] : normal respiratory rhythm and effort [Auscultation Breath Sounds / Voice Sounds] : lungs were clear to auscultation bilaterally [Heart Rate And Rhythm] : heart rate was normal and rhythm regular [Heart Sounds] : normal S1 and S2 [Edema] : there was no peripheral edema [Bowel Sounds] : normal bowel sounds [Abdomen Soft] : soft [Abdomen Tenderness] : non-tender [No CVA Tenderness] : no ~M costovertebral angle tenderness [Skin Color & Pigmentation] : normal skin color and pigmentation [Oriented To Time, Place, And Person] : oriented to person, place, and time [Nail Clubbing] : no clubbing  or cyanosis of the fingernails [Affect] : the affect was normal [Mood] : the mood was normal [FreeTextEntry1] : uses cane for ambulation

## 2022-12-22 NOTE — REVIEW OF SYSTEMS
[Eyesight Problems] : eyesight problems [SOB on Exertion] : shortness of breath during exertion [Incontinence] : incontinence [Arthralgias] : arthralgias [Limb Swelling] : limb swelling [Depression] : depression [Negative] : Heme/Lymph [As Noted in HPI] : as noted in HPI [Fever] : no fever [Chills] : no chills [Earache] : no earache [Nosebleeds] : no nosebleeds [Chest Pain] : no chest pain [Palpitations] : no palpitations [Abdominal Pain] : no abdominal pain [Constipation] : no constipation [Convulsions] : no convulsions [Dizziness] : no dizziness [FreeTextEntry3] : wears glasses, cataract surgery [FreeTextEntry6] : former smoker [FreeTextEntry7] : history of gallstones  [FreeTextEntry9] : uses cane to ambulate, intermittent ankle swelling [de-identified] : on medication

## 2022-12-22 NOTE — END OF VISIT
[FreeTextEntry3] : I was physically present for the key portions of the evaluation and management (E/M) service provided. I agree with the above history, ROS, physical exam, assessment, and plan which I have reviewed and edited where appropriate. I have also reviewed the assessment and management of CKD and HTN with the patient during clinic visit today.\par \par Pt. with CKD stage 3 a in setting of longstanding DM and HTN. Last Scr was 1.24 (eGFR: 46 ml/min) on 10/13/22. Check labs today (as outlined above). Avoid nephrotoxins. Monitor BP and labs.

## 2022-12-22 NOTE — ASSESSMENT
[FreeTextEntry1] : 1. CKD stage 3a: Pt with CKD in setting of longstanding DM and HTN. On review of [facility], pt. noted to have elevated Scr level of 1.38 on 11/30/17. Scr was elevated at 1.52 on 9/29/20. Scr improved/stable at 1.21 on 9/30/21. Scr however was elevated at 1.62 on 9/29/22. UA done on 9/29/22 showed trace proteinuria but no hematuria. UACR was WNL on labs done on 9/29/22. HBsAg and Hep C core Ab were negative on 5/14/17. Last Scr was 1.24 on 10/13/22. Check renal panel, spot urine TP/CR ratio, and serum DERRICK today. Pt. advised to undergo kidney sonogram. Importance of good glycemic and BP control reviewed with patient. Advised patient to avoid NSAIDs, RCAs, and other nephrotoxins. Monitor renal function.  \par \par 2.HTN: BP in target range during office visit today. Low salt diet advised. Monitor BP. \par \par Follow-up pending review of lab results.

## 2022-12-27 LAB
ALBUMIN SERPL ELPH-MCNC: 4.5 G/DL
ANION GAP SERPL CALC-SCNC: 14 MMOL/L
BUN SERPL-MCNC: 32 MG/DL
CALCIUM SERPL-MCNC: 9.1 MG/DL
CHLORIDE SERPL-SCNC: 107 MMOL/L
CO2 SERPL-SCNC: 18 MMOL/L
CREAT SERPL-MCNC: 1.23 MG/DL
CREAT SPEC-SCNC: 81 MG/DL
CREAT/PROT UR: 0.1 RATIO
EGFR: 46 ML/MIN/1.73M2
GLUCOSE SERPL-MCNC: 137 MG/DL
PHOSPHATE SERPL-MCNC: 3.6 MG/DL
POTASSIUM SERPL-SCNC: 5.6 MMOL/L
PROT UR-MCNC: 9 MG/DL
SODIUM SERPL-SCNC: 139 MMOL/L

## 2022-12-28 ENCOUNTER — NON-APPOINTMENT (OUTPATIENT)
Age: 72
End: 2022-12-28

## 2022-12-29 ENCOUNTER — APPOINTMENT (OUTPATIENT)
Dept: DERMATOLOGY | Facility: CLINIC | Age: 72
End: 2022-12-29
Payer: MEDICARE

## 2022-12-29 VITALS — WEIGHT: 215 LBS | BODY MASS INDEX: 39.56 KG/M2 | HEIGHT: 62 IN

## 2022-12-29 DIAGNOSIS — L82.1 OTHER SEBORRHEIC KERATOSIS: ICD-10-CM

## 2022-12-29 DIAGNOSIS — D22.9 MELANOCYTIC NEVI, UNSPECIFIED: ICD-10-CM

## 2022-12-29 DIAGNOSIS — Z12.83 ENCOUNTER FOR SCREENING FOR MALIGNANT NEOPLASM OF SKIN: ICD-10-CM

## 2022-12-29 PROCEDURE — 99203 OFFICE O/P NEW LOW 30 MIN: CPT | Mod: GC

## 2022-12-30 ENCOUNTER — NON-APPOINTMENT (OUTPATIENT)
Age: 72
End: 2022-12-30

## 2022-12-30 LAB — M PROTEIN SPEC IFE-MCNC: NORMAL

## 2023-01-03 LAB
ANION GAP SERPL CALC-SCNC: 14 MMOL/L
BUN SERPL-MCNC: 30 MG/DL
CALCIUM SERPL-MCNC: 9.5 MG/DL
CHLORIDE SERPL-SCNC: 104 MMOL/L
CO2 SERPL-SCNC: 21 MMOL/L
CREAT SERPL-MCNC: 1.2 MG/DL
EGFR: 48 ML/MIN/1.73M2
GLUCOSE SERPL-MCNC: 154 MG/DL
POTASSIUM SERPL-SCNC: 4.6 MMOL/L
SODIUM SERPL-SCNC: 140 MMOL/L

## 2023-01-12 ENCOUNTER — APPOINTMENT (OUTPATIENT)
Dept: INTERNAL MEDICINE | Facility: CLINIC | Age: 73
End: 2023-01-12

## 2023-01-13 ENCOUNTER — APPOINTMENT (OUTPATIENT)
Dept: ENDOCRINOLOGY | Facility: CLINIC | Age: 73
End: 2023-01-13

## 2023-01-20 ENCOUNTER — APPOINTMENT (OUTPATIENT)
Dept: INTERNAL MEDICINE | Facility: CLINIC | Age: 73
End: 2023-01-20
Payer: MEDICARE

## 2023-01-20 VITALS
WEIGHT: 206 LBS | OXYGEN SATURATION: 95 % | HEIGHT: 62 IN | SYSTOLIC BLOOD PRESSURE: 126 MMHG | HEART RATE: 81 BPM | DIASTOLIC BLOOD PRESSURE: 82 MMHG | TEMPERATURE: 97.9 F | BODY MASS INDEX: 37.91 KG/M2

## 2023-01-20 DIAGNOSIS — J44.9 CHRONIC OBSTRUCTIVE PULMONARY DISEASE, UNSPECIFIED: ICD-10-CM

## 2023-01-20 PROCEDURE — 83036 HEMOGLOBIN GLYCOSYLATED A1C: CPT | Mod: QW

## 2023-01-20 PROCEDURE — 99214 OFFICE O/P EST MOD 30 MIN: CPT | Mod: 25

## 2023-01-20 NOTE — HISTORY OF PRESENT ILLNESS
[Other: _____] : [unfilled] [de-identified] : seen today for f/u on ch medical issues \par \par Lives in [place] \par sees Dr Trevon Berry gyn- got referral from her \par  , 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 \par \par c/o cough and congestion few days - hx copd - has been using  symbicort as needed old on review of med  its   \par \par Depression - feeling better with sertraline 50 mg feel good improvement - sleeping well at night - tolerating without side effects \par - gained weight since 2022 \par \par DM -AIC 6.8 2022 \par - was followed by ENDO -now seeing Dr Rodriguez \par - back on glipizide Xl 10 qd and ozympic 1 q weekly since 5 months \par -her Glipizide 10 XL daily-was discontinued and was started on metformin and ozympic - could not tolerate metformin again due to stomach issues - she stopped it - ozympic caused her severe nausea - she held it for last 2 weeks feels better - for last few weeks she has not taken any medications for her DM \par -gained weight \par - saw ophtho 2021 no diabetic retinopathy - did cataract surgery rt eye has complications - pain after surgery - unable to tolerate new lenses \par \par diarrhea- Irritable bowel - resolved doing better \par saw GASTRO consult reviewed S/p EGD 2020- H pylori gastritis s/p Treatment Triple therapy twice daily with food for 10 days - repeat stool test 21 negative \par -much better now -perfect feels like a Miracle \par \par left shoulder rotator cuff tear -  - better ROM no pain \par -saw ortho was told she is too old for sx , advised physical therapy \par -was admitted 16 - left shoulder pain ACS was r/o had tap joint r/o septic arthritis , did MRi dx as left shoulder rotator cuff tear \par - saw ortho too 5 days percoset stopped - ortho did not recommend Pt , now better no pain - was advised not to do things above her shoulder , weights etc \par  \par Hypothyroidism- TSH -dose was increased to 112 mcg 2021 by endo TFT high 2022 needs new rx levothyroxine\par \par osteoporosis- did bone density 2017 followed by endo \par - was on Ibandronate 150 po monthly - till 2021 - dexa 2021 -worsening osteoporosis was started on Prolia injections q 6 months \par -started vit d and calcium 1200 mg \par \par Small pericardial effusion - new onset 2017 when she was admitted for acute bronchitis and sob -saw Cardio DR Shankar Barnhatr on [facility] - did echo , stress test - 2017 \par - on irbesartan HCTZ , still on amlodipine 5 mg \par -he stopped atorvastatin \par \par Emphysema 2017 - smoked 2 PPD for 30 yrs , stopped 3 yrs ago when she had pneumonia , taking inhalers Spiriva and ProAir as needed \par -Ct chest 2017 emphysema and nodules small \par -Saw pulmonologist consult reviewed- Due for CT scan chest in July- will schedule apt \par -Working up for obstructive sleep apnea \par \par HTN\par - irbesartan hctz 300 -12.5 and Amlodipine 5 mg daily \par

## 2023-01-20 NOTE — ASSESSMENT
[FreeTextEntry1] : \par Emphysema 1/2017 - acuet excacebn due to URTI \par -start zpak as directed \par -start symbicort BID \par -albuterol MDi as needed \par - smoked 2 PPD for 30 yrs , stopped few yrs ago when she had pneumonia ,not taking her inhalers Spiriva and ProAir\par - CT chest 1/2017 emphysema and nodules small -reviewed , repeat CT chest 8/2017 - reviewed stable.will make apt with pulm \par - MARK at base [place] , rx for walker with seat given \par - handicap permit signed \par -referral to see pulm given \par \par High cr 1.4 2/2022 ->1.33 5/2022 --. 1.6 9/29/22 repeat 1.2 12/22 stable \par - nephro consult 12/22/22 reviewed \par \par DM x 2 - poc 6.5 1/20/23 stable \par -AIC 6.8 9/2022 \par  -- back on glipizide Xl 10 qd and ozympic 0.75 q weekly --> 1 mg q weekly - seeing ENDO - has appt 3/8/23 \par -D/c metformin 1000 BID ( 9/2020 due to diarrhea) pt restarted on Glipizide ER 5 qD\par - Ophthalmologist-consult reviewed no retinopathy 5/21/21 \par - ASCVD risk - 24.34% high ON atorvastatin to 40 lipids 2/2022 nl \par - on ASA 81 \par \par Depression/ anxiety - PHQ-9 score -2 resolved in remission \par -Depression screening done at this visit. 15 minute spent in assessment and review.\par -Denies homicidal or suicidal ideas or thoughts\par -Discussed with patient in detail side effects of all medications, hand out given, advise patient to inform family member that he or she is taking the medication and to watch out for any personality changes, to seek medical attention immediately if they notice any difference. \par -pt deferred appt to see psych/ therapist \par - cont setraline 50 QHS \par - f/u 3 month s\par \par IBS / diarrhea could be due to Metformin vs lactose intolerance vs IBS s/p EGD 11/23/2020 - H pylori - s/p treatment 1/4/21 stool test negative \par - GASTRO consult reviewed \par -doing better - symptoms resolved \par - cont probiotics \par \par  morbid obesity, BMI 38--> 40 \par -Discuss weight loss\par \par Small pericardial effusion - new onset 1/2017 when she was admitted for acute bronchitis and sob -saw Cardio DR Chaves on [facility] - did echo , stress test - 1/2017 reviewed.\par -denies CP , + MARK at base line , or palpations, or dizzy spells \par \par b12 deficiency - B12 shot today - take 1000 mcg po daily advised \par  \par Hypothyroidism - x TFT elevated 9/2022 change levothyroxine 125 mcg daily ,\par \par HTN- 4 ysr -better now 128/70\par -on Irbesartan HCTZ 300 -12.5 and Amlodipine 5 mg daily \par \par Osteoporosis 6/2017 dEXA - 1/2020 --> 7/2021 dexa worsened - saw endo -started Prolia injections 8/9/21 q 6 months \par - 0n Calcium 1200 mg daily \par -on vitamin d 1000 units daily \par - do weight bearing exercises: walking with weights, stair climbing , weight training , jogging, aerobics etc \par -repeat test 2 yrs for a f/u\par  \par HCM \par PAP- 11/17/22 neg \par colonoscope -4/2018 reviewed - polyp- repeat 5 yrs 2023\par mammo- 11/2021 bi rad 2 -has appt 2/2/23 \par Bone density -7/2021 - osteoporosis worsened ,followed by endocrinologist on prolia q 6 months since 8/2021\par Prevnar 13 given 5/2017\par Pneumovax -given 6/2018 \par tDAP 9/29/2020\par Hep C Negative\par flu vaccine - 10/13/22 \par shingles advised - pt to check insurance and make apt \par HIV screen - refused \par skin check - dermatology referral given \par Pfizer 2/20/21 , 3/22/21 - booster 10/16/21\par

## 2023-02-02 ENCOUNTER — RESULT REVIEW (OUTPATIENT)
Age: 73
End: 2023-02-02

## 2023-02-02 ENCOUNTER — APPOINTMENT (OUTPATIENT)
Dept: ULTRASOUND IMAGING | Facility: IMAGING CENTER | Age: 73
End: 2023-02-02
Payer: MEDICARE

## 2023-02-02 ENCOUNTER — APPOINTMENT (OUTPATIENT)
Dept: MAMMOGRAPHY | Facility: IMAGING CENTER | Age: 73
End: 2023-02-02
Payer: MEDICARE

## 2023-02-02 ENCOUNTER — OUTPATIENT (OUTPATIENT)
Dept: OUTPATIENT SERVICES | Facility: HOSPITAL | Age: 73
LOS: 1 days | End: 2023-02-02
Payer: MEDICARE

## 2023-02-02 DIAGNOSIS — N18.31 CHRONIC KIDNEY DISEASE, STAGE 3A: ICD-10-CM

## 2023-02-02 DIAGNOSIS — Z00.8 ENCOUNTER FOR OTHER GENERAL EXAMINATION: ICD-10-CM

## 2023-02-02 PROCEDURE — 93975 VASCULAR STUDY: CPT | Mod: 26

## 2023-02-02 PROCEDURE — 77063 BREAST TOMOSYNTHESIS BI: CPT

## 2023-02-02 PROCEDURE — 77063 BREAST TOMOSYNTHESIS BI: CPT | Mod: 26

## 2023-02-02 PROCEDURE — 77067 SCR MAMMO BI INCL CAD: CPT

## 2023-02-02 PROCEDURE — 77067 SCR MAMMO BI INCL CAD: CPT | Mod: 26

## 2023-02-02 PROCEDURE — 76641 ULTRASOUND BREAST COMPLETE: CPT | Mod: 26,50

## 2023-02-02 PROCEDURE — 93975 VASCULAR STUDY: CPT

## 2023-02-02 PROCEDURE — 76641 ULTRASOUND BREAST COMPLETE: CPT

## 2023-02-03 ENCOUNTER — RX RENEWAL (OUTPATIENT)
Age: 73
End: 2023-02-03
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
## 2023-02-21 ENCOUNTER — RX RENEWAL (OUTPATIENT)
Age: 73
End: 2023-02-21

## 2023-03-04 ENCOUNTER — RX RENEWAL (OUTPATIENT)
Age: 73
End: 2023-03-04

## 2023-03-08 ENCOUNTER — APPOINTMENT (OUTPATIENT)
Dept: ENDOCRINOLOGY | Facility: CLINIC | Age: 73
End: 2023-03-08
Payer: MEDICARE

## 2023-03-08 VITALS
BODY MASS INDEX: 41.72 KG/M2 | HEART RATE: 77 BPM | DIASTOLIC BLOOD PRESSURE: 80 MMHG | SYSTOLIC BLOOD PRESSURE: 136 MMHG | OXYGEN SATURATION: 96 % | HEIGHT: 61.2 IN | WEIGHT: 221 LBS

## 2023-03-08 PROCEDURE — 77080 DXA BONE DENSITY AXIAL: CPT

## 2023-03-08 PROCEDURE — 96372 THER/PROPH/DIAG INJ SC/IM: CPT

## 2023-03-08 PROCEDURE — ZZZZZ: CPT

## 2023-03-08 PROCEDURE — 99214 OFFICE O/P EST MOD 30 MIN: CPT | Mod: 25

## 2023-03-08 RX ORDER — DENOSUMAB 60 MG/ML
60 INJECTION SUBCUTANEOUS
Qty: 1 | Refills: 0 | Status: COMPLETED | OUTPATIENT
Start: 2023-03-08

## 2023-03-08 RX ADMIN — DENOSUMAB 60 MG/ML: 60 INJECTION SUBCUTANEOUS at 00:00

## 2023-03-09 ENCOUNTER — RX RENEWAL (OUTPATIENT)
Age: 73
End: 2023-03-09

## 2023-03-10 NOTE — PROCEDURE
[FreeTextEntry1] :  \par Bone mineral density March 8, 2023 compared to 2021\par Spine L2-L4 -2.2 osteopenia +10.6% possibly arthritic\par Total hip -1.4 osteopenia no significant change\par  femoral neck -3.6 osteoporosis -11.0%\par Proximal radius -4.3 osteopenia no significant change\par \par \par Bone mineral  density July 16, 2021\par Compare to outside study 2020 indication for previous test showed rapid bone loss\par Spine -2.6 osteoporosis prior -2.5\par Total hip -1.2 osteopenia prior -1.1\par Femoral neck -3.1 osteoporosis prior -2.5\par Proximal radius -4.3 osteoporosis prior -3.0

## 2023-03-10 NOTE — ASSESSMENT
[Denosumab Therapy] : Risks  and benefits of denosumab therapy were discussed with the patient including eczema, cellulitis, osteonecrosis of the jaw and atypical femur fractures [Diabetic Medications] : Risks and benefits of diabetic medications were discussed [Levothyroxine] : The patient was instructed to take Levothyroxine on an empty stomach, separate from vitamins, and wait at least 30 minutes before eating [FreeTextEntry1] : Ms. PRINCE is a 72 year old female w/ osteoporosis, hypothyroidism, and T2DM\par \par Pt states she was told of osteoporosis since 2018. Last BMD 1/2020. She started and is continuing Boniva, from Dr. Denise. Taking correctly, tolerating well. No interval fx, no UGI sx, no thigh pain. No aches & pains. No heartburn. No ONJ. Pt c/o back pain on day of dose which usually resolves. 0 falls in the last 6 months. Fh/o osteoporosis in siblings and daughter, no hip fx. H/o no fx. No unusual risks for osteoporosis. Outside BMD 1/2020 indicates stable osteoporosis in spine, femoral neck, and proximal radius, and worsened osteopenia in total hip.\par BMD 7/2021 shows progressive bone loss especially severe and proximal radius. This raises concerns about primary secondary hyperparathyroidism. Patient advised for an increased risk of future fx. Options for medical therapy discussed in detail. Pt started Prolia 8/2021. Tolerating well. No thigh pain, no interval fx. Normal Ca. No ONJ.\par Bone mineral density 2023 spine not accurate due to arthritis total hip stable but possible decreased in femoral neck stable but severe proximal radius.  I cannot explain the change in femoral neck bone density.  Option of more aggressive therapy with romosozumab reviewed.  Given cardiac risk of underlying Prolia recommend continued Prolia but repeat bone density 1 year. Check collagen cross-links to assess efficacy of anti-resorptive Rx. Pt advised that this may not be covered by insurance. \par  Continue Prolia, from [employer].\par \par H/o hypothyroidism. Chemically and clinically euthyroid on LT4 112 mcg daily. No local neck pain. No dysphagia or dysphonia. No raciness, shakiness, heat/cold intolerance, tiredness, or fatigue. No palpitations, tremors, or sudden weight gain/loss.\par \par H/o T2DM dx 10+ years ago. Pt was initially on Metformin but had GI symptoms, now on Glipizide, added Ozempic 0.5 mg 7/2021 the patient has not tolerated Ozempic due to continued nausea\par Prefer other agents rather than glipizide but patient would like to continue this as it has worked well in the past for her without hypoglycemia.\par stop ozempic\par Reviewed the importance of routine podiatry care.\par  \par \par F/u in 6 months

## 2023-03-10 NOTE — HISTORY OF PRESENT ILLNESS
[Ibandronate (Boniva)] : Ibandronate [Vitamin D (oral)] : Vitamin D orally [Denosumab (Prolia)] : Denosumab [Diabetes] : a history of diabetes [Family History of Osteoporosis] : family history of osteoporosis [FreeTextEntry1] : No significant interval health changes. No interval surgery, hospitalizations, fractures, or change in medications.\par Patient had nephrology consult Dr. Grayson no significant change in treatment.\par Pt started Prolia 8/2021. Tolerating well. No thigh pain, no interval fx. Normal Ca. No recent DDS, full dentures. No ONJ. Not planning major dental work. \par \par Pt states she was told of osteoporosis since 2018. Last BMD 1/2020. She started and is continuing Boniva, from Dr. Denise. Took correctly, tolerated well. 0 falls in the last 6 months. Fh/o osteoporosis in siblings and daughter, no hip fx. H/o no fx. H/o cholecystectomy. No h/o kidney stones or calcium problems.. No ulcers or bleeding. No unusual risks for osteoporosis. No h/o RT. No chronic prednisone use. No h/o Paget's disease. Menopause 32. No HRT use. Up to date with mammography and GYN. Pt takes Vitamin D 1000 IU daily.\par \par Bone mineral density: 1/2020\par Indication: vs.outside study 2017\par Spine: -2.5 osteoporosis, no significant change\par Total hip: -1.1 osteopenia, -5.0%\par Femoral neck: -2.5 osteoporosis, no significant change\par Proximal radius: -3.0 osteoporosis, no significant change\par \par H/o hypothyroidism. Currently on LT4 125 mcg recently increased from 112 mcg daily  . No local neck pain. No dysphagia or dysphonia. No raciness, shakiness, or heat/cold intolerance. No palpitations, tremors, or sudden weight gain/loss. Some tiredness and fatigue.\par \par H/o T2DM dx 10+ years ago. Pt was initially on Metformin but had GI symptoms, now on Glipizide, added Ozempic 0.5 mg 7/2021 for diabetes and weight loss, reported nausea but no real change off rx, did not renew Ozempic due to insurance coverage. No weight loss since starting rx. Last A1c 6.5 NOv 2022   Last opthal within past 6 months. No polyuria or polydipsia.\par \par Elevated lipids, managed by Dr. Denise, managing on diet alone.\par \par Pt had mild COVID 12/2021. No hospitalization, resolved. No residual symptoms. [Low Vit D Intake/Exposure] : no low vitamin D intake [Premat. Menopause (natural)] : no history of premature menopause [Taking Steroids] : no history of taking steroids [Hyperparathyroidism] : no history of hyperparathyroidism [Kidney Stones] : no history of kidney stones [Previous Fragility Fracture] : no previous fragility fracture [Regular Dental Follow-Up] : no regular dental follow-up [Family History of Breast Cancer] : no family history of breast cancer [Family History of Hip Fracture] : no family history of hip fracture [History of Radiation Therapy] : no history of radiation therapy [History of Blood Clots] : no history of blood clots [High Fall Risk] : no fall risk [Frequent Falls] : no frequent falls [Uses a Walker/Cane] : no use of a walker/cane

## 2023-03-10 NOTE — PHYSICAL EXAM
[Alert] : alert [Well Nourished] : well nourished [No Acute Distress] : no acute distress [Well Developed] : well developed [Normal Sclera/Conjunctiva] : normal sclera/conjunctiva [EOMI] : extra ocular movement intact [No Proptosis] : no proptosis [Thyroid Not Enlarged] : the thyroid was not enlarged [No Thyroid Nodules] : no palpable thyroid nodules [Clear to Auscultation] : lungs were clear to auscultation bilaterally [Normal S1, S2] : normal S1 and S2 [Normal Rate] : heart rate was normal [Regular Rhythm] : with a regular rhythm [No Edema] : no peripheral edema [Pedal Pulses Normal] : the pedal pulses are present [Normal Bowel Sounds] : normal bowel sounds [Not Tender] : non-tender [Not Distended] : not distended [Soft] : abdomen soft [Normal Anterior Cervical Nodes] : no anterior cervical lymphadenopathy [No Spinal Tenderness] : no spinal tenderness [Spine Straight] : spine straight [No Stigmata of Cushings Syndrome] : no stigmata of Cushings Syndrome [Normal Gait] : normal gait [Normal Reflexes] : deep tendon reflexes were 2+ and symmetric [No Tremors] : no tremors [Normal Sensation on Monofilament Testing] : normal sensation on monofilament testing of lower extremities [Oriented x3] : oriented to person, place, and time [Normal Posterior Cervical Nodes] : no posterior cervical lymphadenopathy [de-identified] : skin intact in feet

## 2023-03-13 LAB
COLLAGEN NTX UR-SCNC: 367 NMOL BCE
COLLAGEN NTX/CREAT UR-SRTO: 38
CREAT UR-MCNC: 109.6 MG/DL
INTERPRETIVE GUIDE:: NORMAL

## 2023-03-24 ENCOUNTER — RX RENEWAL (OUTPATIENT)
Age: 73
End: 2023-03-24

## 2023-04-10 ENCOUNTER — RX RENEWAL (OUTPATIENT)
Age: 73
End: 2023-04-10

## 2023-04-26 ENCOUNTER — RX RENEWAL (OUTPATIENT)
Age: 73
End: 2023-04-26

## 2023-04-27 ENCOUNTER — APPOINTMENT (OUTPATIENT)
Dept: NEPHROLOGY | Facility: CLINIC | Age: 73
End: 2023-04-27

## 2023-05-15 ENCOUNTER — RX RENEWAL (OUTPATIENT)
Age: 73
End: 2023-05-15

## 2023-05-26 ENCOUNTER — APPOINTMENT (OUTPATIENT)
Dept: INTERNAL MEDICINE | Facility: CLINIC | Age: 73
End: 2023-05-26
Payer: MEDICARE

## 2023-05-26 VITALS — SYSTOLIC BLOOD PRESSURE: 130 MMHG | DIASTOLIC BLOOD PRESSURE: 80 MMHG

## 2023-05-26 VITALS
DIASTOLIC BLOOD PRESSURE: 79 MMHG | OXYGEN SATURATION: 97 % | TEMPERATURE: 98 F | BODY MASS INDEX: 40.02 KG/M2 | HEART RATE: 96 BPM | WEIGHT: 212 LBS | HEIGHT: 61 IN | SYSTOLIC BLOOD PRESSURE: 145 MMHG

## 2023-05-26 VITALS — DIASTOLIC BLOOD PRESSURE: 70 MMHG | SYSTOLIC BLOOD PRESSURE: 110 MMHG

## 2023-05-26 PROCEDURE — 99214 OFFICE O/P EST MOD 30 MIN: CPT | Mod: 25

## 2023-05-26 PROCEDURE — 36415 COLL VENOUS BLD VENIPUNCTURE: CPT

## 2023-05-26 RX ORDER — BLOOD SUGAR DIAGNOSTIC
STRIP MISCELLANEOUS
Qty: 1 | Refills: 6 | Status: ACTIVE | COMMUNITY
Start: 2022-10-13 | End: 1900-01-01

## 2023-05-26 RX ORDER — AZITHROMYCIN 250 MG/1
250 TABLET, FILM COATED ORAL
Qty: 1 | Refills: 0 | Status: DISCONTINUED | COMMUNITY
Start: 2023-01-20 | End: 2023-05-26

## 2023-05-26 RX ORDER — GLIPIZIDE 10 MG/1
10 TABLET, FILM COATED, EXTENDED RELEASE ORAL
Qty: 90 | Refills: 3 | Status: DISCONTINUED | COMMUNITY
Start: 2020-09-29 | End: 2023-05-26

## 2023-05-26 NOTE — HISTORY OF PRESENT ILLNESS
[Other: _____] : [unfilled] [de-identified] : seen today for f/u on ch medical issues \par \par Lives in [place] \par sees Dr Trevon Berry gyn- got referral from her \par  , 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 \par \par Left heel spur big one s/p cortisone shot - trying to walk but painfull - on meloxicm as needed \par \par Depression - feeling better with sertraline 50 mg feel good improvement q daily needs refills \par - sleeping well at night - tolerating without side effects \par - gained weight since 2/2022 \par \par DM -AIC 6.8 9/2022 -- 6.5 1/2023 \par -- was followed by ENDO -now seeing Dr Rodriguez \par BS- 150's taking metformin 1000 qd \par - stopped glipizide Xl 10 qd\par - ozympic 0.5  q weekly ( cannot tolerate 1 mg due to nausea ) \par -her Glipizide 10 XL daily-was discontinued and was started on metformin and ozympic - could not tolerate metformin again due to stomach issues - she stopped it - ozympic caused her severe nausea - she held it for last 2 weeks feels better - for last few weeks she has not taken any medications for her DM \par -gained weight \par - saw ophtho 7/2021 no diabetic retinopathy - did cataract surgery rt eye has complications - pain after surgery - unable to tolerate new lenses \par \par diarrhea- Irritable bowel - resolved doing better \par saw GASTRO consult reviewed S/p EGD 11/23/2020- H pylori gastritis s/p Treatment Triple therapy twice daily with food for 10 days - repeat stool test 1/4/21 negative \par -much better now -perfect feels like a Miracle \par \par left shoulder rotator cuff tear - 2017 - better ROM no pain \par -saw ortho was told she is too old for sx , advised physical therapy \par -was admitted 8/11/17 - left shoulder pain ACS was r/o had tap joint r/o septic arthritis , did MRi dx as left shoulder rotator cuff tear \par - saw ortho too 5 days percoset stopped - ortho did not recommend Pt , now better no pain - was advised not to do things above her shoulder , weights etc \par  \par Hypothyroidism- TSH -dose was increased to 112 mcg 7/2021 by endo TFT high 9/2022 needs new rx levothyroxine\par \par osteoporosis- did bone density 6/2017 followed by endo \par - was on Ibandronate 150 po monthly - till 6/2021 - dexa 7/2021 -worsening osteoporosis was started on Prolia injections q 6 months \par -started vit d and calcium 1200 mg \par \par Small pericardial effusion - new onset 1/2017 when she was admitted for acute bronchitis and sob -saw Cardio DR Shankar Barnhart on [facility] - did echo , stress test - 1/2017 \par - on irbesartan HCTZ , still on amlodipine 5 mg \par -he stopped atorvastatin \par \par Emphysema 1/2017 - smoked 2 PPD for 30 yrs , stopped 3 yrs ago when she had pneumonia , taking inhalers Spiriva and ProAir as needed \par -Ct chest 1/2017 emphysema and nodules small \par -Saw pulmonologist consult reviewed- Due for CT scan chest in July- will schedule apt \par -Working up for obstructive sleep apnea \par \par HTN\par - irbesartan hctz 300 -12.5 and Amlodipine 5 mg daily \par

## 2023-05-26 NOTE — ASSESSMENT
[FreeTextEntry1] : Emphysema 1/2017 -better now \par -symbicort BID \par -albuterol MDi as needed \par - smoked 2 PPD for 30 yrs , stopped few yrs ago 7 yrs ago - when she had pneumonia ,not taking her inhalers Spiriva and ProAir\par - CT chest 1/2017 emphysema and nodules small -reviewed , repeat CT chest 8/2017 - reviewed stable.will make apt with pulm \par - MARK at [place] , rx for walker with seat given \par - handicap permit signed \par -referral to see pulm given - pending \par \par High cr 1.4 2/2022 ->1.33 5/2022 --. 1.6 9/29/22 repeat 1.2 12/22 stable \par - nephro consult 12/22/22 reviewed \par \par DM x 2 - poc 6.5 1/20/23 stable \par -AIC 6.8 9/2022 \par -on Ozempic 0.5 weekly \par -pt restarted metformin 1000 BID tolerating well no gi S?S \par - Ophthalmologist-consult reviewed no retinopathy 5/21/21 \par - ASCVD risk - 24.34% high ON atorvastatin to 40 lipids 9/2022 nl \par - on ASA 81 \par \par Depression/ anxiety - PHQ-9 score -2 resolved in remission \par -Depression screening done at this visit. 15 minute spent in assessment and review.\par -Denies homicidal or suicidal ideas or thoughts\par -Discussed with patient in detail side effects of all medications, hand out given, advise patient to inform family member that he or she is taking the medication and to watch out for any personality changes, to seek medical attention immediately if they notice any difference. \par -pt deferred appt to see psych/ therapist \par - cont setraline 50 QHS \par - f/u 3 month s\par \par IBS / diarrhea could be due to Metformin vs lactose intolerance vs IBS s/p EGD 11/23/2020 - H pylori - s/p treatment 1/4/21 stool test negative \par - GASTRO consult reviewed \par -doing better - symptoms resolved \par - cont probiotics \par \par  morbid obesity, BMI 38--> 40 \par -Discuss weight loss\par \par Small pericardial effusion - new onset 1/2017 when she was admitted for acute bronchitis and sob -[facility] - did [facility] , stress test - 1/2017 reviewed.\par -denies CP , + MARK at base line , or palpations, or dizzy spells \par \par b12 deficiency - B12 shot today - take 1000 mcg po daily advised \par  \par Hypothyroidism - x TFT elevated 9/2022 change levothyroxine 125 mcg daily ,\par \par HTN-  -better now 110/70 \par -on Irbesartan HCTZ 300 -12.5 and Amlodipine 5 mg daily \par \par Osteoporosis 6/2017 dEXA - 1/2020 --> 3/2023  DEXA worsened - saw endo -started Prolia injections 8/9/21 q 6 months \par - 0n Calcium 1200 mg daily \par -on vitamin d 1000 units daily \par - do weight bearing exercises: walking with weights, stair climbing , weight training , jogging, aerobics etc \par -repeat test 2 yrs for a f/u\par  \par HCM \par PAP- 11/17/22 neg \par colonoscope -4/2018 reviewed - polyp- repeat 5 yrs 2023- referral placed \par mammo- 2/2/23 Bi rad 2 \par Bone density -7/2021 - osteoporosis worsened ,followed by endocrinologist on prolia q 6 months since 8/2021\par Prevnar 13 given 5/2017 Pneumovax -given 6/2018 completed \par tDAP 9/29/2020\par Hep C Negative\par flu vaccine - 10/13/22 \par HIV screen - refused \par skin check - dermatology referral given \par Pfizer 2/20/21 , 3/22/21 - booster 10/16/21\par shingrex both shot -5/1/23 ,  8/2022

## 2023-05-30 LAB
ALBUMIN SERPL ELPH-MCNC: 4.6 G/DL
ALP BLD-CCNC: 84 U/L
ALT SERPL-CCNC: 15 U/L
ANION GAP SERPL CALC-SCNC: 18 MMOL/L
AST SERPL-CCNC: 15 U/L
BILIRUB SERPL-MCNC: 0.5 MG/DL
BUN SERPL-MCNC: 40 MG/DL
CALCIUM SERPL-MCNC: 9.7 MG/DL
CHLORIDE SERPL-SCNC: 105 MMOL/L
CO2 SERPL-SCNC: 18 MMOL/L
CREAT SERPL-MCNC: 1.35 MG/DL
EGFR: 42 ML/MIN/1.73M2
ESTIMATED AVERAGE GLUCOSE: 154 MG/DL
GLUCOSE SERPL-MCNC: 165 MG/DL
HBA1C MFR BLD HPLC: 7 %
POTASSIUM SERPL-SCNC: 4.6 MMOL/L
PROT SERPL-MCNC: 7.2 G/DL
SODIUM SERPL-SCNC: 140 MMOL/L
T4 FREE SERPL-MCNC: 1.3 NG/DL
TSH SERPL-ACNC: 1.15 UIU/ML

## 2023-06-14 ENCOUNTER — RX RENEWAL (OUTPATIENT)
Age: 73
End: 2023-06-14

## 2023-07-03 ENCOUNTER — RX RENEWAL (OUTPATIENT)
Age: 73
End: 2023-07-03

## 2023-07-03 RX ORDER — MELOXICAM 15 MG/1
15 TABLET ORAL
Qty: 20 | Refills: 0 | Status: ACTIVE | COMMUNITY
Start: 2020-12-21 | End: 1900-01-01

## 2023-08-10 ENCOUNTER — RX RENEWAL (OUTPATIENT)
Age: 73
End: 2023-08-10

## 2023-08-28 ENCOUNTER — RX RENEWAL (OUTPATIENT)
Age: 73
End: 2023-08-28

## 2023-09-21 ENCOUNTER — APPOINTMENT (OUTPATIENT)
Dept: ENDOCRINOLOGY | Facility: CLINIC | Age: 73
End: 2023-09-21

## 2023-09-21 ENCOUNTER — APPOINTMENT (OUTPATIENT)
Dept: ENDOCRINOLOGY | Facility: CLINIC | Age: 73
End: 2023-09-21
Payer: MEDICARE

## 2023-09-21 PROCEDURE — 96372 THER/PROPH/DIAG INJ SC/IM: CPT

## 2023-09-21 RX ORDER — DENOSUMAB 60 MG/ML
60 INJECTION SUBCUTANEOUS
Qty: 1 | Refills: 0 | Status: COMPLETED | OUTPATIENT
Start: 2023-09-21

## 2023-09-21 RX ADMIN — DENOSUMAB 60 MG/ML: 60 INJECTION SUBCUTANEOUS at 00:00

## 2023-10-13 ENCOUNTER — APPOINTMENT (OUTPATIENT)
Dept: INTERNAL MEDICINE | Facility: CLINIC | Age: 73
End: 2023-10-13
Payer: MEDICARE

## 2023-10-13 VITALS
WEIGHT: 207 LBS | HEIGHT: 61 IN | BODY MASS INDEX: 39.08 KG/M2 | DIASTOLIC BLOOD PRESSURE: 72 MMHG | OXYGEN SATURATION: 98 % | HEART RATE: 87 BPM | SYSTOLIC BLOOD PRESSURE: 132 MMHG | RESPIRATION RATE: 16 BRPM

## 2023-10-13 DIAGNOSIS — Z23 ENCOUNTER FOR IMMUNIZATION: ICD-10-CM

## 2023-10-13 DIAGNOSIS — N18.31 CHRONIC KIDNEY DISEASE, STAGE 3A: ICD-10-CM

## 2023-10-13 PROCEDURE — 99214 OFFICE O/P EST MOD 30 MIN: CPT | Mod: 25

## 2023-10-13 PROCEDURE — 90662 IIV NO PRSV INCREASED AG IM: CPT

## 2023-10-13 PROCEDURE — G0008: CPT

## 2023-10-17 LAB
25(OH)D3 SERPL-MCNC: 35 NG/ML
ALBUMIN SERPL ELPH-MCNC: 4.5 G/DL
ALP BLD-CCNC: 94 U/L
ALT SERPL-CCNC: 16 U/L
ANION GAP SERPL CALC-SCNC: 15 MMOL/L
APPEARANCE: CLEAR
AST SERPL-CCNC: 17 U/L
BACTERIA: NEGATIVE /HPF
BILIRUB SERPL-MCNC: 0.4 MG/DL
BILIRUBIN URINE: NEGATIVE
BLOOD URINE: NEGATIVE
BUN SERPL-MCNC: 33 MG/DL
CALCIUM SERPL-MCNC: 9.2 MG/DL
CAST: 2 /LPF
CHLORIDE SERPL-SCNC: 110 MMOL/L
CHOLEST SERPL-MCNC: 157 MG/DL
CO2 SERPL-SCNC: 16 MMOL/L
COLOR: YELLOW
CREAT SERPL-MCNC: 1.16 MG/DL
CREAT SPEC-SCNC: 70 MG/DL
EGFR: 49 ML/MIN/1.73M2
EPITHELIAL CELLS: 4 /HPF
ESTIMATED AVERAGE GLUCOSE: 148 MG/DL
GLUCOSE QUALITATIVE U: NEGATIVE MG/DL
GLUCOSE SERPL-MCNC: 91 MG/DL
HBA1C MFR BLD HPLC: 6.8 %
HDLC SERPL-MCNC: 54 MG/DL
KETONES URINE: NEGATIVE MG/DL
LDLC SERPL CALC-MCNC: 71 MG/DL
LEUKOCYTE ESTERASE URINE: ABNORMAL
MICROALBUMIN 24H UR DL<=1MG/L-MCNC: 6.9 MG/DL
MICROALBUMIN/CREAT 24H UR-RTO: 99 MG/G
MICROSCOPIC-UA: NORMAL
NITRITE URINE: NEGATIVE
NONHDLC SERPL-MCNC: 103 MG/DL
PH URINE: 5.5
POTASSIUM SERPL-SCNC: 4.9 MMOL/L
PROT SERPL-MCNC: 6.9 G/DL
PROTEIN URINE: NORMAL MG/DL
RED BLOOD CELLS URINE: 0 /HPF
SODIUM SERPL-SCNC: 140 MMOL/L
SPECIFIC GRAVITY URINE: 1.02
TRIGL SERPL-MCNC: 195 MG/DL
TSH SERPL-ACNC: 1.15 UIU/ML
UROBILINOGEN URINE: 0.2 MG/DL
VIT B12 SERPL-MCNC: >2000 PG/ML
WHITE BLOOD CELLS URINE: 1 /HPF

## 2023-11-26 ENCOUNTER — RX RENEWAL (OUTPATIENT)
Age: 73
End: 2023-11-26

## 2024-02-04 ENCOUNTER — RX RENEWAL (OUTPATIENT)
Age: 74
End: 2024-02-04

## 2024-02-15 ENCOUNTER — APPOINTMENT (OUTPATIENT)
Dept: INTERNAL MEDICINE | Facility: CLINIC | Age: 74
End: 2024-02-15
Payer: MEDICARE

## 2024-02-15 VITALS
OXYGEN SATURATION: 98 % | HEART RATE: 70 BPM | DIASTOLIC BLOOD PRESSURE: 74 MMHG | BODY MASS INDEX: 37.38 KG/M2 | WEIGHT: 198 LBS | SYSTOLIC BLOOD PRESSURE: 123 MMHG | HEIGHT: 61 IN

## 2024-02-15 DIAGNOSIS — Z00.00 ENCOUNTER FOR GENERAL ADULT MEDICAL EXAMINATION W/OUT ABNORMAL FINDINGS: ICD-10-CM

## 2024-02-15 PROCEDURE — G0439: CPT

## 2024-02-15 PROCEDURE — 36415 COLL VENOUS BLD VENIPUNCTURE: CPT

## 2024-02-15 RX ORDER — ALBUTEROL SULFATE 90 UG/1
108 (90 BASE) INHALANT RESPIRATORY (INHALATION)
Qty: 8.5 | Refills: 3 | Status: ACTIVE | COMMUNITY
Start: 2023-01-20 | End: 1900-01-01

## 2024-02-15 RX ORDER — BUDESONIDE AND FORMOTEROL FUMARATE DIHYDRATE 160; 4.5 UG/1; UG/1
160-4.5 AEROSOL RESPIRATORY (INHALATION)
Qty: 10.2 | Refills: 6 | Status: ACTIVE | COMMUNITY
Start: 2018-09-20 | End: 1900-01-01

## 2024-02-15 RX ORDER — METFORMIN HYDROCHLORIDE 1000 MG/1
1000 TABLET, COATED ORAL
Qty: 200 | Refills: 1 | Status: ACTIVE | COMMUNITY
Start: 2019-03-18 | End: 1900-01-01

## 2024-02-15 NOTE — ASSESSMENT
[FreeTextEntry1] : Former smoker 40 Yrs smoking - quit 5 yrs ago  Age-73  Asymptomatic  Shared Decision MAking Visit prior to beginning LDCT Lung CA screening ( benefits and harms of screening , follow up diagnostic test , over diagnosis , false positive rate and total radiation exposure ) adherence to screening and willingness to undergo treatment  Emphysema 1/2017 -better  -symbicort BID, -albuterol MDi as needed - smoked 2 PPD for 30 yrs , stopped few yrs ago 7 yrs ago - when she had pneumonia ,not taking her inhalers Spiriva and ProAir - CT chest 1/2017 emphysema and nodules small -reviewed , repeat CT chest 8/2017 - reviewed stable.will make apt with pulm - MARK at [place] , rx for walker with seat given - handicap permit signed -referral to see pulm given - pending  CKD-High cr 1.35 5/2023--> 1.16 10/23   stable - nephro consult 12/22/22 reviewed  DM x 2 - poc 6.8 10/2023  -on Ozempic 0.5 weekly on and off since last 1 yrs due to nausea  -pt restarted metformin 1000 BID tolerating well no gi S?S - Ophthalmologist-consult reviewed no retinopathy 9/2023 f/u due 12/2023   - ASCVD risk - 24.34% high ON atorvastatin to 40 LDL 10/23 71  - on ASA 81  Depression/ anxiety - PHQ-9 score -2 resolved in remission -Depression screening done at this visit. 15 minute spent in assessment and review. -Denies homicidal or suicidal ideas or thoughts -Discussed with patient in detail side effects of all medications, hand out given, advise patient to inform family member that he or she is taking the medication and to watch out for any personality changes, to seek medical attention immediately if they notice any difference. -pt deferred appt to see psych/ therapist - cont setraline 50 QHS - f/u 3 month s  IBS / diarrhea could be due to Metformin vs lactose intolerance vs IBS s/p EGD 11/23/2020 - H pylori - s/p treatment 1/4/21 stool test negative - GASTRO consult reviewed -doing better - symptoms resolved - cont probiotics   morbid obesity, BMI 38--> 40--> 39  -Discuss weight loss  Small pericardial effusion - new onset 1/2017 when she was admitted for acute bronchitis and sob -[facility] - did echo , stress test - 1/2017 reviewed. -denies CP , + MARK at [place] , or palpations, or dizzy spells  b12 deficiency - B12 shot today - take 1000 mcg po daily advised  Hypothyroidism - x TFT elevated 9/2022 change levothyroxine 125 mcg daily ,  HTN- -better now 110/70 -on Irbesartan HCTZ 300 -12.5 and Amlodipine 5 mg daily  Osteoporosis 6/2017 dEXA - 1/2020 --> 3/2023 DEXA worsened - saw endo -started Prolia injections 8/9/21 q 6 months - 0n Calcium 1200 mg daily -on vitamin d 1000 units daily - do weight bearing exercises: walking with weights, stair climbing , weight training , jogging, aerobics etc -repeat test 2 yrs for a f/u  HCM PAP- 11/17/22 neg colonoscope -4/2018 reviewed - polyp- repeat 5 yrs 2023- referral placed again  mammo- 2/2/23 Bi rad 2-referral placed  Bone density -3/2023  - osteoporosis worsened ,followed by endocrinologist on prolia q 6 months since 8/2021 Prevnar 13 given 5/2017 Pneumovax -given 6/2018 completed tDAP 9/29/2020 Hep C Negative flu vaccine - 10/13/23 HIV screen - refused skin check - dermatology referral given Pfizer 2/20/21 , 3/22/21 - booster 10/16/21 shingrex both shot -5/1/22 , 8/2022.

## 2024-02-15 NOTE — HISTORY OF PRESENT ILLNESS
[Other: _____] : [unfilled] [de-identified] : seen today for AWV walking daily 30 minutes has a park near her home  Lives in [place]  sees Dr Trevon Berry gyn- got referral from her   , 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   Depression - feeling better with sertraline 50 mg feel good improvement q daily needs refills  - sleeping well at night - tolerating without side effects  - gained weight since 2/2022   DM -AIC 7 5/2023  -- was followed by ENDO -now seeing Dr Rodriguez  BS- 150's taking metformin 1000 qd  - stopped glipizide Xl 10 qd -on and off  ozympic 0.5  q weekly ( cannot tolerate 1 mg due to nausea )  -her Glipizide 10 XL daily-was discontinued and was started on metformin and ozympic - could not tolerate metformin again due to stomach issues - she stopped it - ozympic caused her severe nausea - she held it for last 2 weeks feels better - for last few weeks she has not taken any medications for her DM  -gained weight  - saw ophtho 10/2023 - has f/u 12/2023  no diabetic retinopathy - did cataract surgery rt eye has complications - pain after surgery - unable to tolerate new lenses   diarrhea- Irritable bowel - resolved doing better  saw GASTRO consult reviewed S/p EGD 11/23/2020- H pylori gastritis s/p Treatment Triple therapy twice daily with food for 10 days - repeat stool test 1/4/21 negative  -much better now -perfect feels like a Miracle   left shoulder rotator cuff tear - 2017 - better ROM no pain  -saw ortho was told she is too old for sx , advised physical therapy  -was admitted 8/11/17 - left shoulder pain ACS was r/o had tap joint r/o septic arthritis , did MRi dx as left shoulder rotator cuff tear  - saw ortho too 5 days percoset stopped - ortho did not recommend Pt , now better no pain - was advised not to do things above her shoulder , weights etc    Hypothyroidism- TSH -dose was increased to 112 mcg 7/2021 by endo TFT high 9/2022 needs new rx levothyroxine  osteoporosis- did bone density 6/2017 followed by endo  - was on Ibandronate 150 po monthly - till 6/2021 - dexa 7/2021 -worsening osteoporosis was started on Prolia injections q 6 months  -started vit d and calcium 1200 mg   Small pericardial effusion - new onset 1/2017 when she was admitted for acute bronchitis and sob -saw Cardio DR Shankar Barnhart on [facility] - did echo , stress test - 1/2017  - on irbesartan HCTZ , still on amlodipine 5 mg  -he stopped atorvastatin   Emphysema 1/2017 - smoked 2 PPD for 30 yrs , stopped 3 yrs ago when she had pneumonia , taking inhalers Spiriva and ProAir as needed  -Ct chest 1/2017 emphysema and nodules small  -Saw pulmonologist consult reviewed- Due for CT scan chest in July- will schedule apt  -Working up for obstructive sleep apnea   HTN - irbesartan hctz 300 -12.5 and Amlodipine 5 mg daily

## 2024-02-15 NOTE — HEALTH RISK ASSESSMENT
[Good] : ~his/her~  mood as  good [Yes] : Yes [2 - 4 times a month (2 pts)] : 2-4 times a month (2 points) [1 or 2 (0 pts)] : 1 or 2 (0 points) [Never (0 pts)] : Never (0 points) [No] : In the past 12 months have you used drugs other than those required for medical reasons? No [No falls in past year] : Patient reported no falls in the past year [0] : 2) Feeling down, depressed, or hopeless: Not at all (0) [PHQ-2 Negative - No further assessment needed] : PHQ-2 Negative - No further assessment needed [Patient reported PAP Smear was normal] : Patient reported PAP Smear was normal [With Significant Other] : lives with significant other [Employed] : employed [] :  [Fully functional (bathing, dressing, toileting, transferring, walking, feeding)] : Fully functional (bathing, dressing, toileting, transferring, walking, feeding) [Reports changes in hearing] : Reports changes in hearing [With Patient/Caregiver] : , with patient/caregiver [Former] : Former [20 or more] : 20 or more [< 15 Years] : < 15 Years [de-identified] : walking daily 30 minutes has a park near her home  [NER4Ibvyc] : 0 [Reports changes in vision] : Reports no changes in vision [Reports changes in dental health] : Reports no changes in dental health [PapSmearDate] : 11/2022  [FreeTextEntry2] : 3 x a week working from Home  [AdvancecareDate] : 2/15/24

## 2024-02-16 LAB
25(OH)D3 SERPL-MCNC: 32.4 NG/ML
ALBUMIN SERPL ELPH-MCNC: 4.4 G/DL
ALP BLD-CCNC: 99 U/L
ALT SERPL-CCNC: 18 U/L
ANION GAP SERPL CALC-SCNC: 14 MMOL/L
APPEARANCE: CLEAR
AST SERPL-CCNC: 20 U/L
BACTERIA: NEGATIVE /HPF
BASOPHILS # BLD AUTO: 0.13 K/UL
BASOPHILS NFR BLD AUTO: 1.3 %
BILIRUB SERPL-MCNC: 0.4 MG/DL
BILIRUBIN URINE: NEGATIVE
BLOOD URINE: NEGATIVE
BUN SERPL-MCNC: 30 MG/DL
CALCIUM SERPL-MCNC: 9.5 MG/DL
CAST: 1 /LPF
CHLORIDE SERPL-SCNC: 107 MMOL/L
CHOLEST SERPL-MCNC: 161 MG/DL
CO2 SERPL-SCNC: 18 MMOL/L
COLOR: YELLOW
CREAT SERPL-MCNC: 1.07 MG/DL
CREAT SPEC-SCNC: 56 MG/DL
EGFR: 55 ML/MIN/1.73M2
EOSINOPHIL # BLD AUTO: 0.35 K/UL
EOSINOPHIL NFR BLD AUTO: 3.5 %
EPITHELIAL CELLS: 6 /HPF
ESTIMATED AVERAGE GLUCOSE: 151 MG/DL
GLUCOSE QUALITATIVE U: NEGATIVE MG/DL
GLUCOSE SERPL-MCNC: 130 MG/DL
HBA1C MFR BLD HPLC: 6.9 %
HCT VFR BLD CALC: 39.4 %
HDLC SERPL-MCNC: 63 MG/DL
HGB BLD-MCNC: 12.5 G/DL
IMM GRANULOCYTES NFR BLD AUTO: 0.4 %
KETONES URINE: NEGATIVE MG/DL
LDLC SERPL CALC-MCNC: 73 MG/DL
LEUKOCYTE ESTERASE URINE: ABNORMAL
LYMPHOCYTES # BLD AUTO: 2.7 K/UL
LYMPHOCYTES NFR BLD AUTO: 26.7 %
MAN DIFF?: NORMAL
MCHC RBC-ENTMCNC: 30 PG
MCHC RBC-ENTMCNC: 31.7 GM/DL
MCV RBC AUTO: 94.5 FL
MICROALBUMIN 24H UR DL<=1MG/L-MCNC: <1.2 MG/DL
MICROALBUMIN/CREAT 24H UR-RTO: NORMAL MG/G
MICROSCOPIC-UA: NORMAL
MONOCYTES # BLD AUTO: 1.24 K/UL
MONOCYTES NFR BLD AUTO: 12.3 %
NEUTROPHILS # BLD AUTO: 5.65 K/UL
NEUTROPHILS NFR BLD AUTO: 55.8 %
NITRITE URINE: NEGATIVE
NONHDLC SERPL-MCNC: 98 MG/DL
PH URINE: 5.5
PLATELET # BLD AUTO: 233 K/UL
POTASSIUM SERPL-SCNC: 4.9 MMOL/L
PROT SERPL-MCNC: 7 G/DL
PROTEIN URINE: NEGATIVE MG/DL
RBC # BLD: 4.17 M/UL
RBC # FLD: 14.3 %
RED BLOOD CELLS URINE: 2 /HPF
SODIUM SERPL-SCNC: 139 MMOL/L
SPECIFIC GRAVITY URINE: 1.02
T4 FREE SERPL-MCNC: 1.4 NG/DL
TRIGL SERPL-MCNC: 144 MG/DL
TSH SERPL-ACNC: 1.53 UIU/ML
UROBILINOGEN URINE: 0.2 MG/DL
VIT B12 SERPL-MCNC: 1598 PG/ML
WBC # FLD AUTO: 10.11 K/UL
WHITE BLOOD CELLS URINE: 3 /HPF

## 2024-02-22 ENCOUNTER — NON-APPOINTMENT (OUTPATIENT)
Age: 74
End: 2024-02-22

## 2024-02-22 VITALS — WEIGHT: 198 LBS | HEIGHT: 61 IN | BODY MASS INDEX: 37.38 KG/M2

## 2024-02-22 DIAGNOSIS — Z87.891 PERSONAL HISTORY OF NICOTINE DEPENDENCE: ICD-10-CM

## 2024-02-22 NOTE — HISTORY OF PRESENT ILLNESS
[Former] : Former [PacksperDay] : 2 [TextBox_13] : Patient is scheduled for a baseline LDCT for lung cancer screening. Shared decision making managed and documented by Dr. Denise. Chart review performed to confirm eligibility for LDCT.   No documented personal or family history of lung cancer. No documented s/s of lung cancer.   [N_Years] : 30 [PacksperYear] : 60

## 2024-02-22 NOTE — PLAN
[FreeTextEntry1] :   - Low dose chest CT for lung cancer screening scheduled for 3/14 at [facility]

## 2024-03-14 ENCOUNTER — APPOINTMENT (OUTPATIENT)
Dept: CT IMAGING | Facility: IMAGING CENTER | Age: 74
End: 2024-03-14

## 2024-03-20 RX ORDER — DENOSUMAB 60 MG/ML
60 INJECTION SUBCUTANEOUS
Qty: 1 | Refills: 1 | Status: ACTIVE | COMMUNITY
Start: 2021-08-02

## 2024-03-21 ENCOUNTER — OUTPATIENT (OUTPATIENT)
Dept: OUTPATIENT SERVICES | Facility: HOSPITAL | Age: 74
LOS: 1 days | End: 2024-03-21
Payer: MEDICARE

## 2024-03-21 ENCOUNTER — RESULT REVIEW (OUTPATIENT)
Age: 74
End: 2024-03-21

## 2024-03-21 ENCOUNTER — APPOINTMENT (OUTPATIENT)
Dept: PULMONOLOGY | Facility: CLINIC | Age: 74
End: 2024-03-21
Payer: MEDICARE

## 2024-03-21 ENCOUNTER — APPOINTMENT (OUTPATIENT)
Dept: MAMMOGRAPHY | Facility: IMAGING CENTER | Age: 74
End: 2024-03-21
Payer: MEDICARE

## 2024-03-21 ENCOUNTER — APPOINTMENT (OUTPATIENT)
Dept: ULTRASOUND IMAGING | Facility: IMAGING CENTER | Age: 74
End: 2024-03-21

## 2024-03-21 VITALS — SYSTOLIC BLOOD PRESSURE: 103 MMHG | HEART RATE: 70 BPM | DIASTOLIC BLOOD PRESSURE: 67 MMHG | OXYGEN SATURATION: 97 %

## 2024-03-21 DIAGNOSIS — Z12.39 ENCOUNTER FOR OTHER SCREENING FOR MALIGNANT NEOPLASM OF BREAST: ICD-10-CM

## 2024-03-21 DIAGNOSIS — Z87.891 PERSONAL HISTORY OF NICOTINE DEPENDENCE: ICD-10-CM

## 2024-03-21 DIAGNOSIS — N63.10 UNSPECIFIED LUMP IN THE RIGHT BREAST, UNSPECIFIED QUADRANT: ICD-10-CM

## 2024-03-21 DIAGNOSIS — R05.9 COUGH, UNSPECIFIED: ICD-10-CM

## 2024-03-21 PROCEDURE — 71046 X-RAY EXAM CHEST 2 VIEWS: CPT

## 2024-03-21 PROCEDURE — 94729 DIFFUSING CAPACITY: CPT

## 2024-03-21 PROCEDURE — 99204 OFFICE O/P NEW MOD 45 MIN: CPT | Mod: 25

## 2024-03-21 PROCEDURE — 94727 GAS DIL/WSHOT DETER LNG VOL: CPT

## 2024-03-21 PROCEDURE — 95012 NITRIC OXIDE EXP GAS DETER: CPT

## 2024-03-21 PROCEDURE — 76641 ULTRASOUND BREAST COMPLETE: CPT

## 2024-03-21 PROCEDURE — ZZZZZ: CPT

## 2024-03-21 PROCEDURE — 94010 BREATHING CAPACITY TEST: CPT

## 2024-03-21 PROCEDURE — 77063 BREAST TOMOSYNTHESIS BI: CPT

## 2024-03-21 PROCEDURE — 77067 SCR MAMMO BI INCL CAD: CPT

## 2024-03-21 PROCEDURE — 77063 BREAST TOMOSYNTHESIS BI: CPT | Mod: 26

## 2024-03-21 PROCEDURE — 76641 ULTRASOUND BREAST COMPLETE: CPT | Mod: 26,50

## 2024-03-21 PROCEDURE — 77067 SCR MAMMO BI INCL CAD: CPT | Mod: 26

## 2024-03-22 PROBLEM — R05.9 COUGH: Status: ACTIVE | Noted: 2024-03-22

## 2024-03-22 NOTE — PROCEDURE
[FreeTextEntry1] : Pulmonary Function Test performed in my office today: Spirometry: Within normal limits; Lung Volume: Within normal limits with air trapping; Diffusion: Within normal limits. __________  Exhaled Nitric Oxide             Final  No Documents Attached    	Test  	Result  	Flag	Reference	Goal  	Exhaled Nitric Oxide	13	 	 	  Ordered by: JAMEL RIVERA       Collected/Examined: 21Mar2024 12:36PM       Verified by: JAMEL RIVERA 21Mar2024 01:06PM       Result Communication: No patient communication needed at this time; Stage: Final       Performed at: In Office       Performed by: JAMEL RIVERA       Resulted: 21Mar2024 12:36PM       Last Updated: 21Mar2024 01:06PM       Accession: [id number]       ______  Xray Chest 2 Views PA/Lat             Final  No Documents Attached    	 Chest x-ray PA and lateral views performed in my office today showed tiny lung nodules seen on bilateral lower lung fields, no evidence of infiltrates or pleural effusions.  Ordered by: JAMEL RIVERA       Collected/Examined: 21Mar2024 12:52PM       Verification Required       Stage: Final       Performed at: In Office       Performed by: JAMEL RIVERA       Resulted: 21Mar2024 12:52PM       Last Updated: 21Mar2024 12:53PM

## 2024-03-22 NOTE — ASSESSMENT
[FreeTextEntry1] : Ordered Z-Reyes and prednisone taper for COPD. Start Trelegy 100/62.5/25) Ellipta for COPD. Get low-dose lung cancer screening chest CT scan for this former heavy cigarette smoker. Return for follow-up in 1 month.

## 2024-03-22 NOTE — DISCUSSION/SUMMARY
[FreeTextEntry1] : Juanita is a pleasant 73-year-old female with cough and exertional dyspnea likely secondary to COPD from history of cigarette smoking, rule out lung nodules in this ex heavy cigarette smoker.

## 2024-03-22 NOTE — PHYSICAL EXAM
[No Acute Distress] : no acute distress [Normal Oropharynx] : normal oropharynx [Normal Appearance] : normal appearance [No Neck Mass] : no neck mass [Normal Rate/Rhythm] : normal rate/rhythm [Normal S1, S2] : normal s1, s2 [No Murmurs] : no murmurs [No Resp Distress] : no resp distress [Clear to Auscultation Bilaterally] : clear to auscultation bilaterally [Benign] : benign [No Abnormalities] : no abnormalities [Normal Gait] : normal gait [No Cyanosis] : no cyanosis [No Clubbing] : no clubbing [FROM] : FROM [No Edema] : no edema [Normal Color/ Pigmentation] : normal color/ pigmentation [No Focal Deficits] : no focal deficits [Oriented x3] : oriented x3 [Normal Affect] : normal affect

## 2024-03-22 NOTE — HISTORY OF PRESENT ILLNESS
[TextBox_4] : Juanita is a pleasant 73-year-old female with history of diabetes mellitus, hypertension, hypothyroidism, osteoporosis, she is an ex smoker, quit 5 years ago, used to smoke 1 pack/day for 44 and 45 years, she came in complaining of cough and exertional dyspnea, but no chest pain, fever or chills.

## 2024-04-01 ENCOUNTER — APPOINTMENT (OUTPATIENT)
Dept: ENDOCRINOLOGY | Facility: CLINIC | Age: 74
End: 2024-04-01
Payer: MEDICARE

## 2024-04-01 VITALS
HEART RATE: 78 BPM | SYSTOLIC BLOOD PRESSURE: 120 MMHG | BODY MASS INDEX: 36.82 KG/M2 | WEIGHT: 195 LBS | OXYGEN SATURATION: 96 % | HEIGHT: 61 IN | DIASTOLIC BLOOD PRESSURE: 72 MMHG

## 2024-04-01 DIAGNOSIS — M81.0 AGE-RELATED OSTEOPOROSIS W/OUT CURRENT PATHOLOGICAL FRACTURE: ICD-10-CM

## 2024-04-01 DIAGNOSIS — E03.9 HYPOTHYROIDISM, UNSPECIFIED: ICD-10-CM

## 2024-04-01 PROCEDURE — 96372 THER/PROPH/DIAG INJ SC/IM: CPT

## 2024-04-01 PROCEDURE — 99214 OFFICE O/P EST MOD 30 MIN: CPT | Mod: 25

## 2024-04-01 PROCEDURE — 77081 DXA BONE DENSITY APPENDICULR: CPT

## 2024-04-01 PROCEDURE — ZZZZZ: CPT

## 2024-04-02 PROBLEM — E03.9 HYPOTHYROIDISM: Status: ACTIVE | Noted: 2017-05-04

## 2024-04-02 PROBLEM — M81.0 OSTEOPOROSIS, POSTMENOPAUSAL: Status: ACTIVE | Noted: 2017-06-01

## 2024-04-02 RX ORDER — DENOSUMAB 60 MG/ML
60 INJECTION SUBCUTANEOUS
Qty: 1 | Refills: 0 | Status: COMPLETED | OUTPATIENT
Start: 2024-04-02

## 2024-04-02 RX ORDER — SODIUM BICARBONATE 650 MG/1
650 TABLET ORAL
Qty: 60 | Refills: 3 | Status: DISCONTINUED | COMMUNITY
Start: 2022-12-23 | End: 2024-04-02

## 2024-04-02 RX ORDER — SODIUM POLYSTYRENE SULFONATE 4.1 MEQ/G
POWDER, FOR SUSPENSION ORAL; RECTAL DAILY
Qty: 6 | Refills: 0 | Status: DISCONTINUED | COMMUNITY
Start: 2022-12-23 | End: 2024-04-02

## 2024-04-02 RX ORDER — PREDNISONE 10 MG/1
10 TABLET ORAL
Qty: 9 | Refills: 0 | Status: DISCONTINUED | COMMUNITY
Start: 2024-03-21 | End: 2024-04-02

## 2024-04-02 RX ORDER — ZOSTER VACCINE RECOMBINANT, ADJUVANTED 50 MCG/0.5
50 KIT INTRAMUSCULAR
Qty: 2 | Refills: 0 | Status: DISCONTINUED | COMMUNITY
Start: 2022-05-26 | End: 2024-04-02

## 2024-04-02 RX ADMIN — DENOSUMAB 60 MG/ML: 60 INJECTION SUBCUTANEOUS at 00:00

## 2024-04-02 NOTE — PROCEDURE
[FreeTextEntry1] : BMD 04/01/2024 compared to 2023 Total Hip: -0.9, normal, +9.0%,  Spine: L2-3 -2.3, osteopenia, ns Femoral Neck: -27, osteoporosis, +23.3%, may not be accurate Due to arthritis of femoral head Forearm: -4.7, osteoporosis, -5.5%  Bone mineral density March 8, 2023 compared to 2021 Spine L2-L4 -2.2 osteopenia +10.6% possibly arthritic Total hip -1.4 osteopenia no significant change  femoral neck -3.6 osteoporosis -11.0% Proximal radius -4.3 osteopenia no significant change  Bone mineral  density July 16, 2021 Compare to outside study 2020 indication for previous test showed rapid bone loss Spine -2.6 osteoporosis prior -2.5 Total hip -1.2 osteopenia prior -1.1 Femoral neck -3.1 osteoporosis prior -2.5 Proximal radius -4.3 osteoporosis prior -3.0  Bone mineral density: 1/2020 Indication: vs.outside study 2017 Spine: -2.5 osteoporosis, no significant change Total hip: -1.1 osteopenia, -5.0% Femoral neck: -2.5 osteoporosis, no significant change Proximal radius: -3.0 osteoporosis, no significant change

## 2024-04-02 NOTE — ASSESSMENT
[Denosumab Therapy] : Risks  and benefits of denosumab therapy were discussed with the patient including eczema, cellulitis, osteonecrosis of the jaw and atypical femur fractures [Diabetic Medications] : Risks and benefits of diabetic medications were discussed [Levothyroxine] : The patient was instructed to take Levothyroxine on an empty stomach, separate from vitamins, and wait at least 30 minutes before eating [FreeTextEntry1] : Ms. PRINCE is a 73 year old female w/ osteoporosis, T2DM, and hypothyroidism.  1. Pt stated she was told of osteoporosis since 2018. Outside BMD 1/2020 indicated stable osteoporosis in spine, femoral neck, and proximal radius, and worsened osteopenia in total hip. She started Boniva, from Dr. Denise. tolerated well. No interval fx, no UGI sx, no thigh pain. No aches & pains. No heartburn. No ONJ. Pt c/o back pain on day of dose which usually resolves. Fh/o osteoporosis in siblings and daughter, no hip fx. BMD 7/2021 showed progressive bone loss especially severe and proximal radius. This raises concerns about primary secondary hyperparathyroidism. Patient advised for an increased risk of future fx. Options for medical therapy discussed in detail. Pt switched to Prolia 8/2021. Tolerating well. No thigh pain, no interval fx. Normal Ca. No ONJ. Bone mineral density 2023 spine not accurate due to arthritis total hip stable but possible decreased in femoral neck stable but severe proximal radius .  BMD 04/2024 shows significantly improved osteoporosis in the fem neck and significantly improved normal total hip, but this may not be accurate either due to arthritis. Prox radius shows sl worsening osteoporosis. Spine L2-3 shows stable osteopenia.  I recommend staying on Prolia due to improved sites in the fem neck and total hip and stable sites in the spine. Continue Prolia, from [facility].  2. H/o T2DM dx 10+ years ago. Pt was initially on Glipizide, now on Metformin 1000mg twice a day, added Ozempic 0.5 mg 7/2021 the patient did not tolerate Ozempic at that dose due to continued nausea and dc. 02/2024: HgA1c 6.9%, urine protein negative Patient restarted Ozempic at a lower dose 0.25 in 03/2024 and reports ~5lb weight loss after 2 weeks. I recommend continuing Ozempic at 0.25 for another 2 weeks before increasing the dose to 0.5 and observe weight change. Reviewed the importance of routine podiatry care.  3. H/o hypothyroidism. Chemically and clinically euthyroid on LT4 125 mcg daily. No local neck pain. No dysphagia or dysphonia. No raciness, shakiness, heat/cold intolerance, tiredness, or fatigue. No palpitations, tremors, or sudden weight gain/loss.  F/u in 6 months with doctor for Prolia and DM management.

## 2024-04-02 NOTE — HISTORY OF PRESENT ILLNESS
[Ibandronate (Boniva)] : Ibandronate [Vitamin D (oral)] : Vitamin D orally [Denosumab (Prolia)] : Denosumab [Diabetes] : a history of diabetes [Family History of Osteoporosis] : family history of osteoporosis [FreeTextEntry1] : No significant interval health changes. No interval surgery, hospitalizations, fractures, or change in medications.  1. Pt stated she was told of osteoporosis since 2018. BMD 1/2020. She had taken Boniva in the past from Dr. Denise. Took correctly, tolerated well. Fh/o osteoporosis in siblings and daughter, no hip fx. H/o cholecystectomy. No h/o kidney stones or calcium problems. Pt started Prolia 8/2021. Tolerating well. No thigh pain, no interval fx. Normal Ca. No recent DDS, full dentures. No ONJ. Not planning major dental work.   Patient had been on prednisone for short term use for chest, discontinued.   BMD 04/2024 shows significantly improved osteoporosis in the fem neck and significantly improved normal total hip, but this may not be accurate either due to arthritis. Prox radius shows sl worsening osteoporosis. Spine L2-3 shows stable osteopenia.  Pt takes Vitamin D 1000 IU daily.  2. H/o T2DM dx 10+ years ago. Pt was initially on Glipizide but had GI symptoms, now on , added Ozempic 0.5 mg 7/2021 for diabetes and weight loss, reported nausea but no real change off rx, Last A1c 6.5 Nov 2022. Last opthal within past 6 months. No polyuria or polydipsia. No hypoglycemic events.  Patient restarted Ozempic but at a low dose of 0.25 and reports weight loss ~5 lbs after 2 weeks. still taking Metformin 1000 twice a day.  02/2024: HgA1c 6.9%, urine protein negative  3. H/o hypothyroidism. Currently on LT4 125 mcg recently increased from 112 mcg daily. No local neck pain. No dysphagia or dysphonia. No raciness, shakiness, or heat/cold intolerance. No palpitations, tremors, or sudden weight gain/loss. Some tiredness and fatigue.  4. Elevated lipids, managed by Dr. Denise, managing on diet alone.  PMHx Patient had nephrology consult Dr. Grayson no significant change in treatment. Pt had mild COVID 12/2021. No hospitalization, resolved. No residual symptoms. [Low Vit D Intake/Exposure] : no low vitamin D intake [Premat. Menopause (natural)] : no history of premature menopause [Taking Steroids] : no history of taking steroids [Hyperparathyroidism] : no history of hyperparathyroidism [Kidney Stones] : no history of kidney stones [Previous Fragility Fracture] : no previous fragility fracture [Regular Dental Follow-Up] : no regular dental follow-up [Family History of Breast Cancer] : no family history of breast cancer [Family History of Hip Fracture] : no family history of hip fracture [History of Radiation Therapy] : no history of radiation therapy [History of Blood Clots] : no history of blood clots [High Fall Risk] : no fall risk [Frequent Falls] : no frequent falls [Uses a Walker/Cane] : no use of a walker/cane

## 2024-04-02 NOTE — END OF VISIT
[FreeTextEntry3] :  I, Rossi Riddle, authored this note working as a medical scribe for Dr. Hirsch.  04/01/2024 .  This note was authored by the medical scribe for me. I have reviewed, edited, and revised the note as needed. I am in agreement with the exam findings, imaging findings, and treatment plan.  Joce Hirsch MD

## 2024-04-02 NOTE — PHYSICAL EXAM
[Alert] : alert [Well Nourished] : well nourished [No Acute Distress] : no acute distress [Well Developed] : well developed [Normal Sclera/Conjunctiva] : normal sclera/conjunctiva [EOMI] : extra ocular movement intact [No Proptosis] : no proptosis [Thyroid Not Enlarged] : the thyroid was not enlarged [No Thyroid Nodules] : no palpable thyroid nodules [No Respiratory Distress] : no respiratory distress [Clear to Auscultation] : lungs were clear to auscultation bilaterally [Normal S1, S2] : normal S1 and S2 [Normal Rate] : heart rate was normal [Regular Rhythm] : with a regular rhythm [No Edema] : no peripheral edema [Pedal Pulses Normal] : the pedal pulses are present [Normal Bowel Sounds] : normal bowel sounds [Not Tender] : non-tender [Not Distended] : not distended [Soft] : abdomen soft [Normal Anterior Cervical Nodes] : no anterior cervical lymphadenopathy [No Spinal Tenderness] : no spinal tenderness [Spine Straight] : spine straight [No Stigmata of Cushings Syndrome] : no stigmata of Cushings Syndrome [Normal Gait] : normal gait [Normal Reflexes] : deep tendon reflexes were 2+ and symmetric [No Tremors] : no tremors [Normal Sensation on Monofilament Testing] : normal sensation on monofilament testing of lower extremities [Oriented x3] : oriented to person, place, and time [de-identified] : skin intact in feet, bunions in ball of foot, [de-identified] : normal foot exam

## 2024-04-21 ENCOUNTER — RX RENEWAL (OUTPATIENT)
Age: 74
End: 2024-04-21

## 2024-04-21 RX ORDER — LEVOTHYROXINE SODIUM 0.12 MG/1
125 TABLET ORAL
Qty: 100 | Refills: 2 | Status: ACTIVE | COMMUNITY
Start: 2017-07-31 | End: 1900-01-01

## 2024-04-21 RX ORDER — IRBESARTAN AND HYDROCHLOROTHIAZIDE 300; 12.5 MG/1; MG/1
300-12.5 TABLET ORAL
Qty: 100 | Refills: 2 | Status: ACTIVE | COMMUNITY
Start: 2017-07-13 | End: 1900-01-01

## 2024-04-25 ENCOUNTER — NON-APPOINTMENT (OUTPATIENT)
Age: 74
End: 2024-04-25

## 2024-05-02 ENCOUNTER — APPOINTMENT (OUTPATIENT)
Dept: PULMONOLOGY | Facility: CLINIC | Age: 74
End: 2024-05-02
Payer: MEDICARE

## 2024-05-02 VITALS — DIASTOLIC BLOOD PRESSURE: 76 MMHG | OXYGEN SATURATION: 95 % | HEART RATE: 79 BPM | SYSTOLIC BLOOD PRESSURE: 133 MMHG

## 2024-05-02 DIAGNOSIS — R91.8 OTHER NONSPECIFIC ABNORMAL FINDING OF LUNG FIELD: ICD-10-CM

## 2024-05-02 DIAGNOSIS — J44.9 CHRONIC OBSTRUCTIVE PULMONARY DISEASE, UNSPECIFIED: ICD-10-CM

## 2024-05-02 PROCEDURE — 99214 OFFICE O/P EST MOD 30 MIN: CPT

## 2024-05-02 RX ORDER — FLUTICASONE FUROATE, UMECLIDINIUM BROMIDE AND VILANTEROL TRIFENATATE 100; 62.5; 25 UG/1; UG/1; UG/1
100-62.5-25 POWDER RESPIRATORY (INHALATION)
Qty: 3 | Refills: 3 | Status: ACTIVE | COMMUNITY
Start: 2024-05-02 | End: 1900-01-01

## 2024-05-04 PROBLEM — R91.8 LUNG NODULES: Status: ACTIVE | Noted: 2024-05-04

## 2024-05-04 NOTE — PROCEDURE
[FreeTextEntry1] : Chest CT scan performed on April 26, 2024 showed upper lobe predominant emphysema.  Benign tiny lung nodules.

## 2024-05-04 NOTE — DISCUSSION/SUMMARY
[FreeTextEntry1] : Juanita is a pleasant 73-year-old female with cough and exertional dyspnea likely secondary to COPD from history of cigarette smoking, with benign-appearing tiny lung nodules in this ex heavy cigarette smoker.

## 2024-05-04 NOTE — ASSESSMENT
[FreeTextEntry1] : Continue Trelegy 100/62.5/25 mcg Ellipta for COPD. Get low-dose lung cancer screening chest CT scan for this former heavy cigarette smoker on a yearly basis. Return for follow-up in 3 months.

## 2024-05-27 ENCOUNTER — RX RENEWAL (OUTPATIENT)
Age: 74
End: 2024-05-27

## 2024-05-27 RX ORDER — ATORVASTATIN CALCIUM 40 MG/1
40 TABLET, FILM COATED ORAL
Qty: 90 | Refills: 2 | Status: ACTIVE | COMMUNITY
Start: 2017-05-04 | End: 1900-01-01

## 2024-05-27 RX ORDER — AMLODIPINE BESYLATE 10 MG/1
10 TABLET ORAL
Qty: 90 | Refills: 2 | Status: ACTIVE | COMMUNITY
Start: 2017-05-05 | End: 1900-01-01

## 2024-06-05 ENCOUNTER — RX ONLY (RX ONLY)
Age: 74
End: 2024-06-05

## 2024-06-06 ENCOUNTER — OFFICE (OUTPATIENT)
Facility: LOCATION | Age: 74
Setting detail: OPHTHALMOLOGY
End: 2024-06-06
Payer: MEDICAID

## 2024-06-06 ENCOUNTER — APPOINTMENT (OUTPATIENT)
Dept: INTERNAL MEDICINE | Facility: CLINIC | Age: 74
End: 2024-06-06
Payer: MEDICARE

## 2024-06-06 VITALS
HEIGHT: 61 IN | HEART RATE: 67 BPM | BODY MASS INDEX: 37.38 KG/M2 | WEIGHT: 198 LBS | SYSTOLIC BLOOD PRESSURE: 157 MMHG | DIASTOLIC BLOOD PRESSURE: 82 MMHG | OXYGEN SATURATION: 97 % | TEMPERATURE: 97.8 F

## 2024-06-06 VITALS — DIASTOLIC BLOOD PRESSURE: 82 MMHG | SYSTOLIC BLOOD PRESSURE: 136 MMHG

## 2024-06-06 DIAGNOSIS — I10 ESSENTIAL (PRIMARY) HYPERTENSION: ICD-10-CM

## 2024-06-06 DIAGNOSIS — Z91.81 HISTORY OF FALLING: ICD-10-CM

## 2024-06-06 DIAGNOSIS — E53.8 DEFICIENCY OF OTHER SPECIFIED B GROUP VITAMINS: ICD-10-CM

## 2024-06-06 DIAGNOSIS — M62.830 MUSCLE SPASM OF BACK: ICD-10-CM

## 2024-06-06 DIAGNOSIS — H40.051: ICD-10-CM

## 2024-06-06 DIAGNOSIS — H25.12: ICD-10-CM

## 2024-06-06 DIAGNOSIS — E11.9 TYPE 2 DIABETES MELLITUS W/OUT COMPLICATIONS: ICD-10-CM

## 2024-06-06 DIAGNOSIS — N18.30 CHRONIC KIDNEY DISEASE, STAGE 3 UNSPECIFIED: ICD-10-CM

## 2024-06-06 DIAGNOSIS — F32.A DEPRESSION, UNSPECIFIED: ICD-10-CM

## 2024-06-06 PROCEDURE — 99214 OFFICE O/P EST MOD 30 MIN: CPT

## 2024-06-06 PROCEDURE — 92250 FUNDUS PHOTOGRAPHY W/I&R: CPT | Performed by: OPHTHALMOLOGY

## 2024-06-06 PROCEDURE — 36415 COLL VENOUS BLD VENIPUNCTURE: CPT

## 2024-06-06 PROCEDURE — 92083 EXTENDED VISUAL FIELD XM: CPT | Performed by: OPHTHALMOLOGY

## 2024-06-06 PROCEDURE — 99212 OFFICE O/P EST SF 10 MIN: CPT | Performed by: OPHTHALMOLOGY

## 2024-06-06 RX ORDER — AZITHROMYCIN 250 MG/1
250 TABLET, FILM COATED ORAL
Qty: 1 | Refills: 0 | Status: COMPLETED | COMMUNITY
Start: 2024-03-21 | End: 2024-06-06

## 2024-06-06 RX ORDER — SERTRALINE HYDROCHLORIDE 100 MG/1
100 TABLET, FILM COATED ORAL
Qty: 90 | Refills: 3 | Status: ACTIVE | COMMUNITY
Start: 2021-09-30 | End: 1900-01-01

## 2024-06-06 RX ORDER — CYCLOBENZAPRINE HYDROCHLORIDE 5 MG/1
5 TABLET, FILM COATED ORAL
Qty: 20 | Refills: 0 | Status: ACTIVE | COMMUNITY
Start: 2024-06-06 | End: 1900-01-01

## 2024-06-06 NOTE — REVIEW OF SYSTEMS
[Wheezing] : no wheezing [Cough] : no cough [Dyspnea on Exertion] : dyspnea on exertion [Negative] : Heme/Lymph [FreeTextEntry9] : see HPI

## 2024-06-06 NOTE — HISTORY OF PRESENT ILLNESS
[FreeTextEntry8] : 73 y.o. female, PMHx depression, DM2, HTN, IBS, hypothyroid, OP, emphysema/COPD, CKD, obesity.  Presents with daughter.   Fall over a week ago.  Had walker and missed a step, fell over walker.  Was able to sit up and got up with help.  No head strike, no lacerations.   Bruising along right side and leg, mostly resolved.  Slight bruising along right ankle, but no pain in the ankle.   Currently has spasming in right mid back on and off.  Has been taking aspirin and applying heat.  Took one dose of daughter's muscle relaxant which really helped.   Asks to do RPA visit today as well that she had scheduled with PCP next week (travels from the [place]).  Tries to get out the park to walk most days.  Uses walker d/t fear of falling.  Overall feels steady and strong on her feet.  Would like to have bedside commode and shower chair to reduce her risk of falls.    Ophthalmology f/u this morning - advised to plan to cataract surgery in the fall.   Now following with pulmonary and reports recent CT lung ca screening.  Breathing feels ok, still winded with excess exertion, uses inhalers as prescribed by pulmonary.   Takes all meds as prescribed.  Daughter helps her with appointments.  Lives with .   Mood so-so.  Feels better with sertraline, but would like to increase dose.  Sleeping well.

## 2024-06-06 NOTE — ASSESSMENT
[FreeTextEntry1] : --- Fall with back pain: Given OP suggest check rib and thoracic spine x-ray but as unlikely to  patient defers for now, will do if not getting better over the next 2 weeks  Suggest heat, PRN tylenol, muscle relaxant at night  H/o smoking: Did lung ca screening - reports normal.     Depression, anxiety: Feeling more anxiety Would like to increase sertraline dose - will start 1.5 tabs (75mg) and increased to 100mg once new Rx received from mail order.    Impaired mobility, decreased strength, falls: Discussed PT to build strength Rx provided for commode and shower chair Uses walker outside of house  Will look into exercise at [facility] for swimming   Emphysema, COPD: Follows with pulmonary, taking inhalers   Elevated ASCVD risk, HTN: continues statin BP initially elevated, reports home BP readings 140s/70-80s, repeat 136/82 Thinks todays elevation is d/t anxiety and pain   Diabetes: Controlled with metformin and Ozempic (every other week low dose d/t nausea) Continues f/u with ophthalmology  Repeat A1C today

## 2024-06-06 NOTE — PHYSICAL EXAM
[No Acute Distress] : no acute distress [Well-Appearing] : well-appearing [Normal Voice/Communication] : normal voice/communication [No Spinal Tenderness] : no spinal tenderness [Grossly Normal Strength/Tone] : grossly normal strength/tone [Coordination Grossly Intact] : coordination grossly intact [No Focal Deficits] : no focal deficits [Normal Gait] : normal gait [Normal] : affect was normal and insight and judgment were intact [de-identified] : TTP at right mid back

## 2024-06-13 ENCOUNTER — APPOINTMENT (OUTPATIENT)
Dept: INTERNAL MEDICINE | Facility: CLINIC | Age: 74
End: 2024-06-13

## 2024-06-15 LAB
ALBUMIN SERPL ELPH-MCNC: 4.5 G/DL
ALP BLD-CCNC: 103 U/L
ALT SERPL-CCNC: 18 U/L
ANION GAP SERPL CALC-SCNC: 13 MMOL/L
AST SERPL-CCNC: 21 U/L
BILIRUB SERPL-MCNC: 0.3 MG/DL
BUN SERPL-MCNC: 33 MG/DL
CALCIUM SERPL-MCNC: 9.1 MG/DL
CHLORIDE SERPL-SCNC: 111 MMOL/L
CO2 SERPL-SCNC: 16 MMOL/L
CREAT SERPL-MCNC: 1.08 MG/DL
EGFR: 54 ML/MIN/1.73M2
ESTIMATED AVERAGE GLUCOSE: 143 MG/DL
GLUCOSE SERPL-MCNC: 85 MG/DL
HBA1C MFR BLD HPLC: 6.6 %
HCT VFR BLD CALC: 38.3 %
HGB BLD-MCNC: 12.2 G/DL
MCHC RBC-ENTMCNC: 29.7 PG
MCHC RBC-ENTMCNC: 31.9 GM/DL
MCV RBC AUTO: 93.2 FL
PLATELET # BLD AUTO: 241 K/UL
POTASSIUM SERPL-SCNC: 5 MMOL/L
PROT SERPL-MCNC: 7.2 G/DL
RBC # BLD: 4.11 M/UL
RBC # FLD: 15.3 %
SODIUM SERPL-SCNC: 140 MMOL/L
VIT B12 SERPL-MCNC: 857 PG/ML
WBC # FLD AUTO: 10.43 K/UL

## 2024-08-01 ENCOUNTER — APPOINTMENT (OUTPATIENT)
Dept: PULMONOLOGY | Facility: CLINIC | Age: 74
End: 2024-08-01

## 2024-09-11 ENCOUNTER — APPOINTMENT (OUTPATIENT)
Dept: PULMONOLOGY | Facility: CLINIC | Age: 74
End: 2024-09-11
Payer: MEDICARE

## 2024-09-11 VITALS — SYSTOLIC BLOOD PRESSURE: 134 MMHG | OXYGEN SATURATION: 98 % | DIASTOLIC BLOOD PRESSURE: 73 MMHG | HEART RATE: 72 BPM

## 2024-09-11 DIAGNOSIS — Z23 ENCOUNTER FOR IMMUNIZATION: ICD-10-CM

## 2024-09-11 PROCEDURE — G0008: CPT

## 2024-09-11 PROCEDURE — 90662 IIV NO PRSV INCREASED AG IM: CPT

## 2024-10-07 ENCOUNTER — OFFICE (OUTPATIENT)
Facility: LOCATION | Age: 74
Setting detail: OPHTHALMOLOGY
End: 2024-10-07
Payer: MEDICAID

## 2024-10-07 DIAGNOSIS — H43.393: ICD-10-CM

## 2024-10-07 DIAGNOSIS — H40.051: ICD-10-CM

## 2024-10-07 DIAGNOSIS — H25.12: ICD-10-CM

## 2024-10-07 PROBLEM — E11.9 TYPE 2 DIABETES MELLITUS WITHOUT COMPLICATIONS: Status: ACTIVE | Noted: 2024-10-07

## 2024-10-07 PROCEDURE — 92136 OPHTHALMIC BIOMETRY: CPT | Performed by: OPHTHALMOLOGY

## 2024-10-07 PROCEDURE — 99214 OFFICE O/P EST MOD 30 MIN: CPT | Performed by: OPHTHALMOLOGY

## 2024-10-07 PROCEDURE — 92134 CPTRZ OPH DX IMG PST SGM RTA: CPT | Performed by: OPHTHALMOLOGY

## 2024-10-07 ASSESSMENT — REFRACTION_AUTOREFRACTION
OS_SPHERE: -2.00
OD_AXIS: 020
OS_CYLINDER: SPH
OD_SPHERE: +0.25
OD_CYLINDER: -0.50

## 2024-10-07 ASSESSMENT — KERATOMETRY
OS_K2POWER_DIOPTERS: 6.25
OD_K1K2_AVERAGE: 46.125
OS_AXISANGLE2_DEGREES: 175
OS_K1K2_AVERAGE: 25.75
OD_K1POWER_DIOPTERS: 45.25
OD_AXISANGLE_DEGREES: 105
OS_K1POWER_DIOPTERS: 45.25
OS_AXISANGLE_DEGREES: 085
OS_K1POWER_DIOPTERS: 45.25
OD_K1POWER_DIOPTERS: 45.25
OD_K2POWER_DIOPTERS: 47.00
OD_CYLAXISANGLE_DEGREES: 105
OS_CYLAXISANGLE_DEGREES: 085
OD_AXISANGLE_DEGREES: 15
OD_AXISANGLE2_DEGREES: 105
OS_AXISANGLE_DEGREES: 085
OD_CYLPOWER_DEGREES: 1.75
OD_K2POWER_DIOPTERS: 47.00
OS_K2POWER_DIOPTERS: 6.25
OS_CYLPOWER_DEGREES: 39

## 2024-10-07 ASSESSMENT — REFRACTION_CURRENTRX
OS_OVR_VA: 20/
OD_AXIS: 180
OD_CYLINDER: 0.00
OS_CYLINDER: -0.25
OD_OVR_VA: 20/
OS_SPHERE: +1.25
OD_SPHERE: +2.50
OS_AXIS: 106

## 2024-10-07 ASSESSMENT — REFRACTION_MANIFEST
OD_VA1: 20/30
OS_SPHERE: -2.00
OS_CYLINDER: SPH
OD_VA1: 20/30
OD_CYLINDER: -0.50
OS_CYLINDER: SPH
OS_VA1: 20/40-2
OD_CYLINDER: -0.50
OD_AXIS: 020
OD_SPHERE: +0.25
OS_VA1: 20/40-2
OD_SPHERE: +0.25
OD_AXIS: 180
OS_SPHERE: -2.00

## 2024-10-07 ASSESSMENT — TONOMETRY
OS_IOP_MMHG: 19
OD_IOP_MMHG: 16

## 2024-10-07 ASSESSMENT — VISUAL ACUITY
OS_BCVA: 20/40
OD_BCVA: 20/50

## 2024-10-10 ENCOUNTER — APPOINTMENT (OUTPATIENT)
Dept: ENDOCRINOLOGY | Facility: CLINIC | Age: 74
End: 2024-10-10
Payer: MEDICARE

## 2024-10-10 VITALS
BODY MASS INDEX: 36.66 KG/M2 | HEART RATE: 78 BPM | SYSTOLIC BLOOD PRESSURE: 122 MMHG | OXYGEN SATURATION: 98 % | DIASTOLIC BLOOD PRESSURE: 74 MMHG | WEIGHT: 194 LBS

## 2024-10-10 DIAGNOSIS — E03.9 HYPOTHYROIDISM, UNSPECIFIED: ICD-10-CM

## 2024-10-10 DIAGNOSIS — E11.9 TYPE 2 DIABETES MELLITUS W/OUT COMPLICATIONS: ICD-10-CM

## 2024-10-10 DIAGNOSIS — M81.0 AGE-RELATED OSTEOPOROSIS W/OUT CURRENT PATHOLOGICAL FRACTURE: ICD-10-CM

## 2024-10-10 PROCEDURE — 99214 OFFICE O/P EST MOD 30 MIN: CPT | Mod: 25

## 2024-10-10 PROCEDURE — 96372 THER/PROPH/DIAG INJ SC/IM: CPT

## 2024-10-10 RX ORDER — DENOSUMAB 60 MG/ML
60 INJECTION SUBCUTANEOUS
Qty: 1 | Refills: 0 | Status: COMPLETED | OUTPATIENT
Start: 2024-10-10

## 2024-10-10 RX ADMIN — DENOSUMAB 60 MG/ML: 60 INJECTION SUBCUTANEOUS at 00:00

## 2024-11-01 ENCOUNTER — NON-APPOINTMENT (OUTPATIENT)
Age: 74
End: 2024-11-01

## 2024-11-01 ENCOUNTER — APPOINTMENT (OUTPATIENT)
Dept: INTERNAL MEDICINE | Facility: CLINIC | Age: 74
End: 2024-11-01
Payer: MEDICARE

## 2024-11-01 VITALS
HEIGHT: 61 IN | HEART RATE: 68 BPM | SYSTOLIC BLOOD PRESSURE: 114 MMHG | DIASTOLIC BLOOD PRESSURE: 70 MMHG | OXYGEN SATURATION: 97 % | WEIGHT: 190 LBS | TEMPERATURE: 97.6 F | BODY MASS INDEX: 35.87 KG/M2

## 2024-11-01 DIAGNOSIS — H25.13 AGE-RELATED NUCLEAR CATARACT, BILATERAL: ICD-10-CM

## 2024-11-01 DIAGNOSIS — Z01.818 ENCOUNTER FOR OTHER PREPROCEDURAL EXAMINATION: ICD-10-CM

## 2024-11-01 PROCEDURE — 93000 ELECTROCARDIOGRAM COMPLETE: CPT

## 2024-11-01 PROCEDURE — 99213 OFFICE O/P EST LOW 20 MIN: CPT

## 2024-11-04 ENCOUNTER — RX RENEWAL (OUTPATIENT)
Age: 74
End: 2024-11-04

## 2024-11-11 ENCOUNTER — OFFICE (OUTPATIENT)
Facility: LOCATION | Age: 74
Setting detail: OPHTHALMOLOGY
End: 2024-11-11
Payer: MEDICAID

## 2024-11-11 DIAGNOSIS — H40.051: ICD-10-CM

## 2024-11-11 DIAGNOSIS — H25.12: ICD-10-CM

## 2024-11-11 PROCEDURE — 99213 OFFICE O/P EST LOW 20 MIN: CPT | Performed by: OPTOMETRIST

## 2024-11-11 ASSESSMENT — KERATOMETRY
OS_AXISANGLE_DEGREES: 085
OS_K2POWER_DIOPTERS: 45.75
OD_K2POWER_DIOPTERS: 46.75
OD_K1POWER_DIOPTERS: 46.00
OD_AXISANGLE_DEGREES: 080
OS_K1POWER_DIOPTERS: 45.25

## 2024-11-11 ASSESSMENT — VISUAL ACUITY
OS_BCVA: 20/60
OD_BCVA: 20/50

## 2024-11-11 ASSESSMENT — REFRACTION_MANIFEST
OD_SPHERE: +1.25
OS_VA1: 20/40-2
OS_VA1: 20/40
OD_VA1: 20/30
OD_AXIS: 180
OD_CYLINDER: -0.50
OD_AXIS: 100
OD_VA1: 20/50
OS_CYLINDER: -1.25
OS_SPHERE: -2.00
OS_SPHERE: -1.00
OD_CYLINDER: -1.25
OS_AXIS: 070
OS_CYLINDER: SPH
OD_SPHERE: +0.25

## 2024-11-11 ASSESSMENT — REFRACTION_CURRENTRX
OD_CYLINDER: 0.00
OD_AXIS: 180
OS_CYLINDER: -0.25
OD_SPHERE: +2.50
OS_AXIS: 106
OD_OVR_VA: 20/
OS_OVR_VA: 20/
OS_SPHERE: +1.25

## 2024-11-11 ASSESSMENT — REFRACTION_AUTOREFRACTION
OS_CYLINDER: -1.25
OD_CYLINDER: -1.25
OD_SPHERE: +1.25
OS_AXIS: 070
OS_SPHERE: -1.00
OD_AXIS: 100

## 2024-11-11 ASSESSMENT — TONOMETRY: OD_IOP_MMHG: 17

## 2024-11-29 ENCOUNTER — OFFICE (OUTPATIENT)
Facility: LOCATION | Age: 74
Setting detail: OPHTHALMOLOGY
End: 2024-11-29
Payer: MEDICAID

## 2024-11-29 ENCOUNTER — RX ONLY (RX ONLY)
Age: 74
End: 2024-11-29

## 2024-11-29 DIAGNOSIS — Z96.1: ICD-10-CM

## 2024-11-29 DIAGNOSIS — H40.051: ICD-10-CM

## 2024-11-29 DIAGNOSIS — H25.12: ICD-10-CM

## 2024-11-29 PROCEDURE — 99024 POSTOP FOLLOW-UP VISIT: CPT | Performed by: OPHTHALMOLOGY

## 2024-11-29 ASSESSMENT — REFRACTION_CURRENTRX
OS_SPHERE: +1.25
OD_AXIS: 180
OD_CYLINDER: 0.00
OS_OVR_VA: 20/
OS_AXIS: 106
OD_SPHERE: +2.50
OD_OVR_VA: 20/
OS_CYLINDER: -0.25

## 2024-11-29 ASSESSMENT — REFRACTION_AUTOREFRACTION
OS_AXIS: 070
OD_AXIS: 100
OD_CYLINDER: -1.25
OD_SPHERE: +1.25
OS_CYLINDER: -1.25
OS_SPHERE: -1.00

## 2024-11-29 ASSESSMENT — REFRACTION_MANIFEST
OS_SPHERE: -2.00
OD_SPHERE: +1.25
OD_CYLINDER: -0.50
OS_SPHERE: -1.00
OD_AXIS: 100
OS_CYLINDER: -1.25
OD_CYLINDER: -1.25
OD_VA1: 20/50
OS_VA1: 20/40-2
OD_VA1: 20/30
OD_SPHERE: +0.25
OS_AXIS: 070
OS_VA1: 20/40
OS_CYLINDER: SPH
OD_AXIS: 180

## 2024-11-29 ASSESSMENT — KERATOMETRY
OD_K1POWER_DIOPTERS: 46.00
OS_K1POWER_DIOPTERS: 45.25
OD_AXISANGLE_DEGREES: 080
OD_K2POWER_DIOPTERS: 46.75
OS_K2POWER_DIOPTERS: 45.75
OS_AXISANGLE_DEGREES: 085

## 2024-11-29 ASSESSMENT — CORNEAL EDEMA CLINICAL DESCRIPTION: OS_CORNEALEDEMA: T

## 2024-11-29 ASSESSMENT — TONOMETRY: OS_IOP_MMHG: 14

## 2024-11-29 ASSESSMENT — VISUAL ACUITY
OS_BCVA: 20/60
OD_BCVA: 20/30

## 2024-12-12 NOTE — PATIENT PROFILE ADULT. - BILL OF RIGHTS/ADMISSION INFORMATION PROVIDED TO:
What Is The Reason For Today's Visit?: Annual Full Body Skin Examination with No Concerns
What Is The Reason For Today's Visit? (Being Monitored For X): concerning skin lesions on a periodic basis
Patient

## 2024-12-30 ENCOUNTER — APPOINTMENT (OUTPATIENT)
Dept: PULMONOLOGY | Facility: CLINIC | Age: 74
End: 2024-12-30

## 2025-01-02 ENCOUNTER — RX RENEWAL (OUTPATIENT)
Age: 75
End: 2025-01-02

## 2025-01-06 ENCOUNTER — OFFICE (OUTPATIENT)
Facility: LOCATION | Age: 75
Setting detail: OPHTHALMOLOGY
End: 2025-01-06
Payer: MEDICAID

## 2025-01-06 DIAGNOSIS — Z96.1: ICD-10-CM

## 2025-01-06 PROCEDURE — 99024 POSTOP FOLLOW-UP VISIT: CPT | Performed by: OPHTHALMOLOGY

## 2025-01-06 ASSESSMENT — REFRACTION_MANIFEST
OS_CYLINDER: SPH
OD_SPHERE: +0.25
OD_VA1: 20/30
OD_CYLINDER: -0.50
OD_AXIS: 065
OS_SPHERE: PL
OS_SPHERE: -2.00
OS_VA1: 20/20-2
OS_VA1: 20/40-2
OS_CYLINDER: SPH
OD_CYLINDER: -0.50
OD_VA1: 20/30-2
OD_SPHERE: +0.25
OD_AXIS: 180

## 2025-01-06 ASSESSMENT — KERATOMETRY
OS_AXISANGLE_DEGREES: 085
OD_K1POWER_DIOPTERS: 45.25
OS_K2POWER_DIOPTERS: 45.50
OS_K1POWER_DIOPTERS: 45.25
OD_K2POWER_DIOPTERS: 46.00
OD_AXISANGLE_DEGREES: 110

## 2025-01-06 ASSESSMENT — REFRACTION_AUTOREFRACTION
OS_SPHERE: PL
OD_SPHERE: +0.25
OD_AXIS: 065
OD_CYLINDER: -0.50
OS_CYLINDER: SPH

## 2025-01-06 ASSESSMENT — CORNEAL EDEMA CLINICAL DESCRIPTION: OS_CORNEALEDEMA: T

## 2025-01-06 ASSESSMENT — REFRACTION_CURRENTRX
OD_SPHERE: +2.50
OD_AXIS: 180
OD_CYLINDER: 0.00
OS_CYLINDER: -0.25
OD_OVR_VA: 20/
OS_SPHERE: +1.25
OS_AXIS: 106
OS_OVR_VA: 20/

## 2025-01-06 ASSESSMENT — VISUAL ACUITY
OS_BCVA: 20/30+1
OD_BCVA: 20/20-2

## 2025-01-06 ASSESSMENT — TONOMETRY: OS_IOP_MMHG: 14

## 2025-01-22 ENCOUNTER — APPOINTMENT (OUTPATIENT)
Dept: PULMONOLOGY | Facility: CLINIC | Age: 75
End: 2025-01-22
Payer: MEDICARE

## 2025-01-22 VITALS
RESPIRATION RATE: 16 BRPM | OXYGEN SATURATION: 97 % | DIASTOLIC BLOOD PRESSURE: 84 MMHG | HEART RATE: 68 BPM | SYSTOLIC BLOOD PRESSURE: 136 MMHG

## 2025-01-22 DIAGNOSIS — Z87.891 PERSONAL HISTORY OF NICOTINE DEPENDENCE: ICD-10-CM

## 2025-01-22 DIAGNOSIS — J44.9 CHRONIC OBSTRUCTIVE PULMONARY DISEASE, UNSPECIFIED: ICD-10-CM

## 2025-01-22 PROCEDURE — 88738 HGB QUANT TRANSCUTANEOUS: CPT

## 2025-01-22 PROCEDURE — ZZZZZ: CPT

## 2025-01-22 PROCEDURE — 99213 OFFICE O/P EST LOW 20 MIN: CPT | Mod: 25

## 2025-01-22 PROCEDURE — 94010 BREATHING CAPACITY TEST: CPT

## 2025-01-22 PROCEDURE — 94729 DIFFUSING CAPACITY: CPT

## 2025-01-22 PROCEDURE — 94727 GAS DIL/WSHOT DETER LNG VOL: CPT

## 2025-01-26 LAB — POCT - HEMOGLOBIN (HGB), QUANTITATIVE, TRANSCUTANEOUS: 11.5

## 2025-02-08 ENCOUNTER — RX RENEWAL (OUTPATIENT)
Age: 75
End: 2025-02-08

## 2025-03-13 ENCOUNTER — APPOINTMENT (OUTPATIENT)
Dept: INTERNAL MEDICINE | Facility: CLINIC | Age: 75
End: 2025-03-13
Payer: MEDICARE

## 2025-03-13 ENCOUNTER — RX RENEWAL (OUTPATIENT)
Age: 75
End: 2025-03-13

## 2025-03-13 ENCOUNTER — LABORATORY RESULT (OUTPATIENT)
Age: 75
End: 2025-03-13

## 2025-03-13 VITALS
DIASTOLIC BLOOD PRESSURE: 81 MMHG | HEIGHT: 61 IN | SYSTOLIC BLOOD PRESSURE: 133 MMHG | BODY MASS INDEX: 36.63 KG/M2 | OXYGEN SATURATION: 97 % | HEART RATE: 77 BPM | TEMPERATURE: 97.6 F | WEIGHT: 194 LBS

## 2025-03-13 DIAGNOSIS — M62.89 OTHER SPECIFIED DISORDERS OF MUSCLE: ICD-10-CM

## 2025-03-13 DIAGNOSIS — Z00.00 ENCOUNTER FOR GENERAL ADULT MEDICAL EXAMINATION W/OUT ABNORMAL FINDINGS: ICD-10-CM

## 2025-03-13 PROCEDURE — 36415 COLL VENOUS BLD VENIPUNCTURE: CPT

## 2025-03-13 PROCEDURE — G0439: CPT

## 2025-03-13 PROCEDURE — G0009: CPT

## 2025-03-13 PROCEDURE — 90677 PCV20 VACCINE IM: CPT

## 2025-03-14 ENCOUNTER — LABORATORY RESULT (OUTPATIENT)
Age: 75
End: 2025-03-14

## 2025-03-15 ENCOUNTER — NON-APPOINTMENT (OUTPATIENT)
Age: 75
End: 2025-03-15

## 2025-03-15 LAB
25(OH)D3 SERPL-MCNC: 21.5 NG/ML
ALBUMIN SERPL ELPH-MCNC: 4.2 G/DL
ALP BLD-CCNC: 96 U/L
ALT SERPL-CCNC: 16 U/L
ANION GAP SERPL CALC-SCNC: 17 MMOL/L
AST SERPL-CCNC: 17 U/L
BILIRUB SERPL-MCNC: 0.4 MG/DL
BUN SERPL-MCNC: 25 MG/DL
CALCIUM SERPL-MCNC: 9.1 MG/DL
CHLORIDE SERPL-SCNC: 109 MMOL/L
CHOLEST SERPL-MCNC: 143 MG/DL
CO2 SERPL-SCNC: 16 MMOL/L
CREAT SERPL-MCNC: 1.18 MG/DL
CREAT SPEC-SCNC: 124 MG/DL
EGFRCR SERPLBLD CKD-EPI 2021: 48 ML/MIN/1.73M2
ESTIMATED AVERAGE GLUCOSE: 148 MG/DL
GLUCOSE SERPL-MCNC: 97 MG/DL
HBA1C MFR BLD HPLC: 6.8 %
HCT VFR BLD CALC: 38.1 %
HDLC SERPL-MCNC: 54 MG/DL
HGB BLD-MCNC: 12.2 G/DL
LDLC SERPL CALC-MCNC: 64 MG/DL
MCHC RBC-ENTMCNC: 29.5 PG
MCHC RBC-ENTMCNC: 32 G/DL
MCV RBC AUTO: 92 FL
MICROALBUMIN 24H UR DL<=1MG/L-MCNC: 3.2 MG/DL
MICROALBUMIN/CREAT 24H UR-RTO: 25 MG/G
NONHDLC SERPL-MCNC: 89 MG/DL
PLATELET # BLD AUTO: 212 K/UL
POTASSIUM SERPL-SCNC: 5.2 MMOL/L
PROT SERPL-MCNC: 6.8 G/DL
RBC # BLD: 4.14 M/UL
RBC # FLD: 14.6 %
SODIUM SERPL-SCNC: 141 MMOL/L
TRIGL SERPL-MCNC: 150 MG/DL
TSH SERPL-ACNC: 4.94 UIU/ML
VIT B12 SERPL-MCNC: 553 PG/ML
WBC # FLD AUTO: 7.58 K/UL

## 2025-03-17 DIAGNOSIS — R15.9 FULL INCONTINENCE OF FECES: ICD-10-CM

## 2025-03-18 RX ORDER — CEPHALEXIN 500 MG/1
500 TABLET ORAL
Qty: 14 | Refills: 0 | Status: ACTIVE | COMMUNITY
Start: 2025-03-18 | End: 1900-01-01

## 2025-03-31 NOTE — CONSULT LETTER
So when practicing this at home:   1. Inhale and let the muscles relax   2. Exhale and perform a kegel (closing your openings and lifting up and in)   3. Hold for about 2-3 seconds (usually about the length of an exhale)  4. Inhale and imagine filling the pelvis up with air as the pelvic floor muscles expand/stretch.   5. Exhale and rest muscles. Make sure the muscles are dropped away fully before repeating        [Dear  ___] : Dear  [unfilled], [Courtesy Letter:] : I had the pleasure of seeing your patient, [unfilled], in my office today. [Please see my note below.] : Please see my note below. [Consult Closing:] : Thank you very much for allowing me to participate in the care of this patient.  If you have any questions, please do not hesitate to contact me. [Sincerely,] : Sincerely, [FreeTextEntry3] : Lit Washington M.D.\par

## 2025-04-15 ENCOUNTER — NON-APPOINTMENT (OUTPATIENT)
Age: 75
End: 2025-04-15

## 2025-04-16 ENCOUNTER — APPOINTMENT (OUTPATIENT)
Dept: ENDOCRINOLOGY | Facility: CLINIC | Age: 75
End: 2025-04-16
Payer: MEDICARE

## 2025-04-16 VITALS — WEIGHT: 191 LBS | BODY MASS INDEX: 36.53 KG/M2 | HEIGHT: 60.5 IN

## 2025-04-16 DIAGNOSIS — M81.0 AGE-RELATED OSTEOPOROSIS W/OUT CURRENT PATHOLOGICAL FRACTURE: ICD-10-CM

## 2025-04-16 DIAGNOSIS — E03.9 HYPOTHYROIDISM, UNSPECIFIED: ICD-10-CM

## 2025-04-16 DIAGNOSIS — E11.9 TYPE 2 DIABETES MELLITUS W/OUT COMPLICATIONS: ICD-10-CM

## 2025-04-16 PROCEDURE — ZZZZZ: CPT

## 2025-04-16 PROCEDURE — 77080 DXA BONE DENSITY AXIAL: CPT

## 2025-04-16 PROCEDURE — 99215 OFFICE O/P EST HI 40 MIN: CPT | Mod: 25

## 2025-04-16 PROCEDURE — 96372 THER/PROPH/DIAG INJ SC/IM: CPT

## 2025-04-16 RX ORDER — DENOSUMAB 60 MG/ML
60 INJECTION SUBCUTANEOUS
Qty: 1 | Refills: 0 | Status: COMPLETED | OUTPATIENT
Start: 2025-04-16

## 2025-04-16 RX ADMIN — DENOSUMAB 60 MG/ML: 60 INJECTION SUBCUTANEOUS at 00:00

## 2025-04-23 ENCOUNTER — APPOINTMENT (OUTPATIENT)
Dept: PULMONOLOGY | Facility: CLINIC | Age: 75
End: 2025-04-23

## 2025-05-02 RX ORDER — SEMAGLUTIDE 0.68 MG/ML
2 INJECTION, SOLUTION SUBCUTANEOUS
Qty: 3 | Refills: 1 | Status: ACTIVE | COMMUNITY
Start: 2025-05-02

## 2025-05-30 ENCOUNTER — APPOINTMENT (OUTPATIENT)
Dept: INTERNAL MEDICINE | Facility: CLINIC | Age: 75
End: 2025-05-30
Payer: MEDICARE

## 2025-05-30 ENCOUNTER — NON-APPOINTMENT (OUTPATIENT)
Age: 75
End: 2025-05-30

## 2025-05-30 VITALS
HEART RATE: 73 BPM | TEMPERATURE: 97.1 F | OXYGEN SATURATION: 97 % | WEIGHT: 187 LBS | BODY MASS INDEX: 35.76 KG/M2 | SYSTOLIC BLOOD PRESSURE: 148 MMHG | DIASTOLIC BLOOD PRESSURE: 77 MMHG | HEIGHT: 60.5 IN

## 2025-05-30 VITALS — DIASTOLIC BLOOD PRESSURE: 82 MMHG | SYSTOLIC BLOOD PRESSURE: 134 MMHG

## 2025-05-30 DIAGNOSIS — R32 UNSPECIFIED URINARY INCONTINENCE: ICD-10-CM

## 2025-05-30 DIAGNOSIS — M62.89 OTHER SPECIFIED DISORDERS OF MUSCLE: ICD-10-CM

## 2025-05-30 DIAGNOSIS — I10 ESSENTIAL (PRIMARY) HYPERTENSION: ICD-10-CM

## 2025-05-30 DIAGNOSIS — E11.9 TYPE 2 DIABETES MELLITUS W/OUT COMPLICATIONS: ICD-10-CM

## 2025-05-30 DIAGNOSIS — R15.9 FULL INCONTINENCE OF FECES: ICD-10-CM

## 2025-05-30 PROCEDURE — 99214 OFFICE O/P EST MOD 30 MIN: CPT

## 2025-05-30 PROCEDURE — G2211 COMPLEX E/M VISIT ADD ON: CPT

## 2025-05-30 PROCEDURE — 93000 ELECTROCARDIOGRAM COMPLETE: CPT

## 2025-06-01 LAB
APPEARANCE: CLEAR
BACTERIA UR CULT: NORMAL
BACTERIA: NEGATIVE /HPF
BILIRUBIN URINE: NEGATIVE
BLOOD URINE: NEGATIVE
CAST: 2 /LPF
COLOR: YELLOW
EPITHELIAL CELLS: 4 /HPF
GLUCOSE QUALITATIVE U: NEGATIVE MG/DL
KETONES URINE: NEGATIVE MG/DL
LEUKOCYTE ESTERASE URINE: NEGATIVE
MICROSCOPIC-UA: NORMAL
NITRITE URINE: NEGATIVE
PH URINE: 6
PROTEIN URINE: NEGATIVE MG/DL
RED BLOOD CELLS URINE: 0 /HPF
SPECIFIC GRAVITY URINE: 1.02
UROBILINOGEN URINE: 0.2 MG/DL
WHITE BLOOD CELLS URINE: 1 /HPF

## 2025-09-17 ENCOUNTER — LABORATORY RESULT (OUTPATIENT)
Age: 75
End: 2025-09-17

## 2025-09-17 ENCOUNTER — APPOINTMENT (OUTPATIENT)
Dept: INTERNAL MEDICINE | Facility: CLINIC | Age: 75
End: 2025-09-17
Payer: MEDICARE

## 2025-09-17 VITALS
OXYGEN SATURATION: 96 % | HEIGHT: 60.5 IN | WEIGHT: 184 LBS | TEMPERATURE: 97.6 F | SYSTOLIC BLOOD PRESSURE: 133 MMHG | DIASTOLIC BLOOD PRESSURE: 73 MMHG | BODY MASS INDEX: 35.19 KG/M2 | HEART RATE: 70 BPM

## 2025-09-17 DIAGNOSIS — N18.30 CHRONIC KIDNEY DISEASE, STAGE 3 UNSPECIFIED: ICD-10-CM

## 2025-09-17 DIAGNOSIS — Z23 ENCOUNTER FOR IMMUNIZATION: ICD-10-CM

## 2025-09-17 DIAGNOSIS — R32 UNSPECIFIED URINARY INCONTINENCE: ICD-10-CM

## 2025-09-17 DIAGNOSIS — E11.9 TYPE 2 DIABETES MELLITUS W/OUT COMPLICATIONS: ICD-10-CM

## 2025-09-17 DIAGNOSIS — E03.9 HYPOTHYROIDISM, UNSPECIFIED: ICD-10-CM

## 2025-09-17 DIAGNOSIS — J44.9 CHRONIC OBSTRUCTIVE PULMONARY DISEASE, UNSPECIFIED: ICD-10-CM

## 2025-09-17 PROCEDURE — 90662 IIV NO PRSV INCREASED AG IM: CPT

## 2025-09-17 PROCEDURE — 99214 OFFICE O/P EST MOD 30 MIN: CPT

## 2025-09-17 PROCEDURE — G0008: CPT

## 2025-09-17 PROCEDURE — G2211 COMPLEX E/M VISIT ADD ON: CPT

## 2025-09-17 PROCEDURE — 36415 COLL VENOUS BLD VENIPUNCTURE: CPT

## 2025-09-19 LAB
ALBUMIN SERPL ELPH-MCNC: 4.5 G/DL
ALBUMIN, RANDOM URINE: 1.3 MG/DL
ALP BLD-CCNC: 83 U/L
ALT SERPL-CCNC: 20 U/L
ANION GAP SERPL CALC-SCNC: 16 MMOL/L
APPEARANCE: CLEAR
AST SERPL-CCNC: 20 U/L
BILIRUB SERPL-MCNC: 0.4 MG/DL
BILIRUBIN URINE: NEGATIVE
BLOOD URINE: NEGATIVE
BUN SERPL-MCNC: 29 MG/DL
CALCIUM SERPL-MCNC: 9.7 MG/DL
CHLORIDE SERPL-SCNC: 106 MMOL/L
CO2 SERPL-SCNC: 17 MMOL/L
COLOR: YELLOW
CREAT SERPL-MCNC: 1.28 MG/DL
CREAT SPEC-SCNC: 152 MG/DL
EGFRCR SERPLBLD CKD-EPI 2021: 43 ML/MIN/1.73M2
ESTIMATED AVERAGE GLUCOSE: 128 MG/DL
GLUCOSE QUALITATIVE U: NEGATIVE MG/DL
GLUCOSE SERPL-MCNC: 94 MG/DL
HBA1C MFR BLD HPLC: 6.1 %
KETONES URINE: ABNORMAL MG/DL
LEUKOCYTE ESTERASE URINE: ABNORMAL
MICROALBUMIN/CREAT 24H UR-RTO: 9 MG/G
NITRITE URINE: NEGATIVE
PH URINE: 5.5
POTASSIUM SERPL-SCNC: 5 MMOL/L
PROT SERPL-MCNC: 7.2 G/DL
PROTEIN URINE: NEGATIVE MG/DL
SODIUM SERPL-SCNC: 139 MMOL/L
SPECIFIC GRAVITY URINE: 1.02
T3FREE SERPL-MCNC: 2.36 PG/ML
T4 FREE SERPL-MCNC: 1.2 NG/DL
TSH SERPL-ACNC: 1.91 UIU/ML
UROBILINOGEN URINE: 0.2 MG/DL